# Patient Record
Sex: FEMALE | Employment: OTHER | URBAN - NONMETROPOLITAN AREA
[De-identification: names, ages, dates, MRNs, and addresses within clinical notes are randomized per-mention and may not be internally consistent; named-entity substitution may affect disease eponyms.]

---

## 2020-07-20 ENCOUNTER — TELEPHONE (OUTPATIENT)
Dept: FAMILY MEDICINE CLINIC | Age: 85
End: 2020-07-20

## 2020-07-29 ENCOUNTER — TELEPHONE (OUTPATIENT)
Dept: FAMILY MEDICINE CLINIC | Age: 85
End: 2020-07-29

## 2021-02-24 ENCOUNTER — OUTSIDE SERVICES (OUTPATIENT)
Dept: FAMILY MEDICINE CLINIC | Age: 86
End: 2021-02-24
Payer: COMMERCIAL

## 2021-02-24 DIAGNOSIS — F48.2 PBA (PSEUDOBULBAR AFFECT): ICD-10-CM

## 2021-02-24 DIAGNOSIS — Z79.4 TYPE 2 DIABETES MELLITUS WITH DIABETIC NEPHROPATHY, WITH LONG-TERM CURRENT USE OF INSULIN (HCC): ICD-10-CM

## 2021-02-24 DIAGNOSIS — I10 ESSENTIAL HYPERTENSION: ICD-10-CM

## 2021-02-24 DIAGNOSIS — G30.1 LATE ONSET ALZHEIMER'S DISEASE WITH BEHAVIORAL DISTURBANCE (HCC): ICD-10-CM

## 2021-02-24 DIAGNOSIS — F02.818 LATE ONSET ALZHEIMER'S DISEASE WITH BEHAVIORAL DISTURBANCE (HCC): ICD-10-CM

## 2021-02-24 DIAGNOSIS — E11.21 TYPE 2 DIABETES MELLITUS WITH DIABETIC NEPHROPATHY, WITH LONG-TERM CURRENT USE OF INSULIN (HCC): ICD-10-CM

## 2021-02-24 DIAGNOSIS — D50.9 IRON DEFICIENCY ANEMIA, UNSPECIFIED IRON DEFICIENCY ANEMIA TYPE: Primary | ICD-10-CM

## 2021-02-24 NOTE — PROGRESS NOTES
2/92/1648    Suzanne Chai  [birth year]    This resident is being seen today for a follow-up evaluation visit. 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 She is a 80 y.o. female resident who is being seen today for a follow-up evaluation visit and does reside here on memory care. She has been under assessment for a drop in her H/H and Procrit was recommended but denied given the fact that she has not been placed on iron supplementation. Iron studies were therefore completed and she was appropriate placed on iron daily. She is a resident who indicates that she is doing well and she offers no acute complaints. She did have evidence of a dressing to her right shin area which was removed per her request and brought to the attention of the wound management nurse. This area is kept covered due to irritation from the resident. It was the result of a previous skin tear but has had improvement. Resident currently denies any complaints of pain, headache or dizziness, sore throat, chest pain, coughing or shortness of breath, nausea or vomiting, constipation or diarrhea, dysuria or frequency, fever or chills, recent falls or syncopal events.       Patient's: Nuedexta 20-10 mg 1 capsule twice daily, Remeron 15 mg at bedtime, Tylenol 650 mg every 4 hours as needed, senna tabs every 24 hours as needed, atorvastatin 20 mg at bedtime, aspirin 81 mg daily, Keppra 250 mg twice daily, Westrum 1 packet 2 times a day, Norvasc 5 mg daily, metoprolol tartrate 25 mg twice daily, multivitamin daily, Namenda 28 mg daily, vitamin D3 50,000units weekly, Tradjenta 5 mg daily, Byetta 5 mcg subcutaneously twice daily, Humulin R solution 5 units subcutaneously prior to meals, Celexa 20 mg daily, zinc 50 mg twice daily, vitamin C 500 mg daily, Levemir 10 units subcutaneously in the evening, ferrous sulfate 325 mg daily, Humalog KwikPen sliding scale coverage 4 times a day 201250 =3 units, 251300 =4 units, 301350 =6 units, 351400 =9 units, greater than 400 call MD      Objective     Vital Signs: /62 pulse 73 respiration 16 temperature 98.2 O2 94% weight 165 pounds      Physical examination:Skin is essentially warm and dry. She does have an area to the right lower extremity on the medial aspect with a previous skin tear which is healing. HEENT unremarkable. Neck is supple. Heart regular rate and rhythm with a soft systolic murmur without appreciable change. No rubs or gallops  noted. Lungs are clear to auscultation. No evidence of rhonchi, rales, or wheezing. Abdomen is soft, supple and non-tender. Bowel sounds are noted x4 quadrants. No rigidity, guarding or rebound tenderness. Negative Olmos's, negative McBurney's, negative Hans's. Extremities; she does have evidence of venous insufficiency without any true pitting edema. Pulses are adequate. No clubbing  or no cyanosis noted. She does have chronic arthritic changes with motion restriction to the shoulders and knees. Neurologically she  is alert and oriented x3. No evidence of paralysis or paresthesias noted. Diagnoses and all orders for this visit:    Iron deficiency anemia, unspecified iron deficiency anemia type  Comments:  Ferrous sulfate 325 mg initiated. Will repeat CBC in 1 month. Discontinue Procrit until further notice. Essential hypertension  Comments:  Controlled. Maintain low-dose aspirin with Norvasc and metoprolol.     Type 2 diabetes mellitus with diabetic nephropathy, with long-term current use of insulin (HCC)  Comments:  Controlled with sliding scale coverage, Namenda, Tradjenta, Byetta, and Humulin R as well as Levemir. PBA (pseudobulbar affect)  Comments:  Stable with Nuedexta. Late onset Alzheimer's disease with behavioral disturbance ([address]  Comments:  Maintain low-dose aspirin with Nuedexta. Plan: Plan of care was discussed with the healthcare team with meds and labs reviewed. Labs from January with the inclusion of an H/H of 8.5/8.6 BUN/creatinine 47/2.6 with a GFR of 17. With respect to her hemoglobin, iron studies were obtained on February 19 of 2021 and iron levels were noted to be deficient with an iron of 34 TIBC 225% saturation 15 transferrin 174 and she was thereby placed on ferrous sulfate 325 mg daily. My recommendation at this time will be to discontinue the Procrit and maintain the iron supplementation and reevaluate a CBC in the course of 1 month. It is of note to mention that with respect to her kidney function, resident has adamantly refused any intervention in this regard. I will therefore maintain her current medical regimen as already set forth, monitoring her sugar screens accordingly and I will track her intakes, monitor her weights and behaviors, and see her routinely and as needed with further orders forthcoming. SAMIR ART, APRN - CNP      *Note was creating using voice recognition software.   The document was reviewed however grammatical errors may exist.

## 2021-03-22 ENCOUNTER — OUTSIDE SERVICES (OUTPATIENT)
Dept: FAMILY MEDICINE CLINIC | Age: 86
End: 2021-03-22
Payer: COMMERCIAL

## 2021-03-22 DIAGNOSIS — I10 ESSENTIAL HYPERTENSION: ICD-10-CM

## 2021-03-22 DIAGNOSIS — D50.9 IRON DEFICIENCY ANEMIA, UNSPECIFIED IRON DEFICIENCY ANEMIA TYPE: ICD-10-CM

## 2021-03-22 DIAGNOSIS — E11.21 TYPE 2 DIABETES MELLITUS WITH DIABETIC NEPHROPATHY, WITH LONG-TERM CURRENT USE OF INSULIN (HCC): ICD-10-CM

## 2021-03-22 DIAGNOSIS — Z79.4 TYPE 2 DIABETES MELLITUS WITH DIABETIC NEPHROPATHY, WITH LONG-TERM CURRENT USE OF INSULIN (HCC): ICD-10-CM
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
## 2021-03-22 DIAGNOSIS — F43.21 ADJUSTMENT DISORDER WITH DEPRESSED MOOD: ICD-10-CM

## 2021-03-29 ENCOUNTER — OUTSIDE SERVICES (OUTPATIENT)
Dept: FAMILY MEDICINE CLINIC | Age: 86
End: 2021-03-29
Payer: COMMERCIAL

## 2021-03-29 DIAGNOSIS — F43.21 ADJUSTMENT DISORDER WITH DEPRESSED MOOD: ICD-10-CM

## 2021-03-29 DIAGNOSIS — Z79.4 TYPE 2 DIABETES MELLITUS WITH DIABETIC NEPHROPATHY, WITH LONG-TERM CURRENT USE OF INSULIN (HCC): ICD-10-CM

## 2021-03-29 DIAGNOSIS — R41.82 ALTERED MENTAL STATUS, UNSPECIFIED ALTERED MENTAL STATUS TYPE: Primary | ICD-10-CM

## 2021-03-29 DIAGNOSIS — D50.9 IRON DEFICIENCY ANEMIA, UNSPECIFIED IRON DEFICIENCY ANEMIA TYPE: ICD-10-CM

## 2021-03-29 DIAGNOSIS — R46.89 BEHAVIOR CONCERN: ICD-10-CM

## 2021-03-29 DIAGNOSIS — E11.21 TYPE 2 DIABETES MELLITUS WITH DIABETIC NEPHROPATHY, WITH LONG-TERM CURRENT USE OF INSULIN (HCC): ICD-10-CM

## 2021-03-29 PROCEDURE — 99310 SBSQ NF CARE HIGH MDM 45: CPT | Performed by: NURSE PRACTITIONER

## 2021-03-29 NOTE — PROGRESS NOTES
subcutaneously in the evening  ferrous sulfate 325 mg daily  Humalog KwikPen sliding scale coverage 4 times a day 201250 =3 units, 251300 =4 units, 301350 =6 units, 351400 =9 units, greater than 400 call MD      Objective     Vital Signs: /68 pulse 72 respiration 16 temperature 97.6 O2 97% weight 166 pounds      Physical examination:Skin is essentially warm and dry. She does have an area to the right lower extremity on the medial aspect with a previous skin tear which is healing. HEENT unremarkable. Neck is supple. Heart regular rate and rhythm with a soft systolic murmur without appreciable change. No rubs or gallops  noted. Lungs are clear to auscultation. No evidence of rhonchi, rales, or wheezing. Abdomen is soft, supple and non-tender. Bowel sounds are noted x4 quadrants. No rigidity, guarding or rebound tenderness. Negative Olmos's, negative McBurney's, negative Hans's. Extremities; she does have evidence of venous insufficiency without any true pitting edema. Pulses are adequate. No clubbing  or no cyanosis noted. She does have chronic arthritic changes with motion restriction to the shoulders and knees. Neurologically she  is alert and oriented x3. No evidence of paralysis or paresthesias noted. Diagnoses and all orders for this visit:    Altered mental status, unspecified altered mental status type  Comments: Will obtain UA C&S with ammonia level. Behavior concern  Comments:  Resident more angry with staffing. Will obtain urinalysis and ammonia level. Adjustment disorder with depressed mood  Comments:  Maintain Nuedexta with low-dose Remeron and Namenda. Iron deficiency anemia, unspecified iron deficiency anemia type    Type 2 diabetes mellitus with diabetic nephropathy, with long-term current use of insulin (HCC)  Comments:  Maintain Tradjenta, Byetta, and sliding scale coverage along with Humulin R prior to meals.     Plan: Plan of care was discussed with the healthcare team with meds and labs reviewed. BUN/creatinine 55/2.8 with a GFR of 16 with an H/H of 9.0/28.8. Based on the concern with respect to the resident and her behavior, I am been to recommend a urinalysis with culture and sensitivity. I will furthermore obtain an ammonia level in the morning. I do not feel that there is really any underlying infectious etiology, but just her adjustment disorder. She may benefit from an anxiolytic if we are unable to control her behaviors. At this time I will maintain her current medical regimen and I will track her intakes, monitor her weights and behaviors, and see her routinely and as needed with further orders forthcoming. SAMIR ART, APRN - CNP      *Note was creating using voice recognition software.   The document was reviewed however grammatical errors may exist.

## 2021-04-26 ENCOUNTER — TELEPHONE (OUTPATIENT)
Dept: FAMILY MEDICINE CLINIC | Age: 86
End: 2021-04-26

## 2021-04-26 ENCOUNTER — OUTSIDE SERVICES (OUTPATIENT)
Dept: FAMILY MEDICINE CLINIC | Age: 86
End: 2021-04-26
Payer: COMMERCIAL

## 2021-04-26 DIAGNOSIS — D50.9 IRON DEFICIENCY ANEMIA, UNSPECIFIED IRON DEFICIENCY ANEMIA TYPE: ICD-10-CM

## 2021-04-26 DIAGNOSIS — Z79.4 TYPE 2 DIABETES MELLITUS WITH DIABETIC NEPHROPATHY, WITH LONG-TERM CURRENT USE OF INSULIN (HCC): ICD-10-CM

## 2021-04-26 DIAGNOSIS — I10 ESSENTIAL HYPERTENSION: ICD-10-CM

## 2021-04-26 DIAGNOSIS — E11.21 TYPE 2 DIABETES MELLITUS WITH DIABETIC NEPHROPATHY, WITH LONG-TERM CURRENT USE OF INSULIN (HCC): ICD-10-CM

## 2021-04-26 DIAGNOSIS — F43.21 ADJUSTMENT DISORDER WITH DEPRESSED MOOD: ICD-10-CM
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
## 2021-04-26 NOTE — TELEPHONE ENCOUNTER
Daughter called with concerns on her mother who resides at [place], states mother has been calling her over the last 2 weeks stating that she wants daughter to get large sums of money out of bank, that mother is leaving [place], going someplace else, not making any sense at all, refusing to take any medications except her insulin. Daughter, [facility] has got calls from [place] informing her of medications that ilsa will not take. [facility] is hoping someone can see her mother soon.

## 2021-04-27 NOTE — PROGRESS NOTES
7/50/4880    Jamshid Sierra  [birth year]    This resident is being seen today for a follow-up evaluation visit. 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 She is a 80 y.o. female resident who is being seen today for a follow-up evaluation visit with acute concerns regarding recurrent falls. Resident did have 2 falls on 4/24/2021. She apparently did have a skin tear noted to her left knee. This is a resident to is fairly alert and oriented and is independently ambulatory with the use of a walker. She does remember her falls and indicates that she did hurt her knee and that the dressing was changed today. This is a resident who is still fairly adamant about going back to the home setting, which I did explain to her would be at the discretion of her family. This resident is a known diabetic but apparently was refusing blood sugar Accu-Cheks and they were therefore discontinued due to ongoing refusal.  They are checked on an as-needed basis. This resident denies any other complaints at this time in terms of pain, headaches or dizziness, sore throat, chest pain, coughing or shortness of breath, nausea or vomiting, constipation or diarrhea, fever or chills syncopal events or seizure activity.         Medications:   Nuedexta 20-10 mg 1 capsule twice daily Remeron 15 mg at bedtime  Tylenol 650 mg every 4 hours as needed senna tabs every 24 hours as needed atorvastatin 20 mg at bedtime  aspirin 81 mg daily  Keppra 250 mg twice daily  Questran 1 packet 2 times a day  Norvasc 5 mg daily  metoprolol tartrate 25 mg twice daily multivitamin daily  Namenda 28 mg daily vitamin D3 50,000units weekly  Tradjenta 5 mg daily  Byetta 5 mcg subcutaneously twice daily Humulin R solution 5 units subcutaneously prior to meals  Celexa 20 mg daily  zinc 50 mg twice daily  vitamin C 500 mg daily  Levemir 10 units subcutaneously in the evening  ferrous sulfate 325 mg daily        Objective     Vital Signs: /72 pulse 71 respiration 18 temperature 97.7 O2 97% weight 163.3 pounds      Physical examination:Skin is essentially warm and dry. Resident is being monitored accordingly with respect to a skin tear to the left knee. Resident does have a dry patchy area which is reddened and nonraised to the left jawline. HEENT unremarkable. Neck is supple. Heart regular rate and rhythm with a soft systolic murmur without appreciable change. No rubs or gallops  noted. Lungs are clear to auscultation. No evidence of rhonchi, rales, or wheezing. Abdomen is soft, supple and non-tender. Bowel sounds are noted x4 quadrants. No rigidity, guarding or rebound tenderness. Negative Olmos's, negative McBurney's, negative Hans's. Extremities; she does have evidence of venous insufficiency without any true pitting edema. Pulses are adequate. No clubbing  or no cyanosis noted. She does have chronic arthritic changes with motion restriction to the shoulders and knees. Neurologically she  is alert and oriented x3. No evidence of paralysis or paresthesias noted.     Diagnoses and all orders for this visit:    Fall, sequela  Comments:  Currently stable with a sign in room to ask for help with benefit of gripper socks when not utilizing shoes    Essential hypertension  Comments:  Controlled with low-dose aspirin, Norvasc and close observation of blood pressure    Type 2 diabetes mellitus with diabetic nephropathy, with long-term current use of insulin ([facility])  Comments:  Stable with Byetta, Humulin R and Levemir with blood sugar Accu-Cheks discontinued due to refusal    Iron deficiency anemia, unspecified iron deficiency anemia type  Comments:  Stable with multivitamin and iron supplementation and close observation of CBC    Adjustment disorder with depressed mood  Comments:  Stable with low-dose Remeron, Namenda, and Nuedexta    Plan: Plan of care was discussed with the healthcare team with meds and labs reviewed. H/H of 8.9/28.3 BUN/creatinine 47/2.4 with a GFR of 19. Resident does appear to be relatively stable with respect to her falls and she is being monitored accordingly in this regard. She does have a sign in her room to remind her to ask for assistance benefits from gripper socks when not utilizing shoes. Regarding the area on her face, I am going to recommend the benefit of Kenalog 0.1% to be applied to that left jawline twice a day and then as needed thereafter. I will otherwise maintain her plan of care with the benefit of her Rollator walker when ambulating and her regular diet along with her super cereal and house supplement. We will furthermore maintain her low-dose Remeron. I will track her intakes, monitor her weights and behaviors, and see her routinely and as needed with further orders forthcoming. SAMIR ART, APRN - CNP      *Note was creating using voice recognition software.   The document was reviewed however grammatical errors may exist.

## 2021-04-29 ENCOUNTER — OUTSIDE SERVICES (OUTPATIENT)
Dept: FAMILY MEDICINE CLINIC | Age: 86
End: 2021-04-29
Payer: COMMERCIAL

## 2021-04-29 VITALS
RESPIRATION RATE: 18 BRPM | OXYGEN SATURATION: 97 % | TEMPERATURE: 97.9 F | DIASTOLIC BLOOD PRESSURE: 72 MMHG | HEART RATE: 73 BPM | SYSTOLIC BLOOD PRESSURE: 165 MMHG

## 2021-04-29 DIAGNOSIS — E78.5 HYPERLIPIDEMIA, UNSPECIFIED HYPERLIPIDEMIA TYPE: ICD-10-CM

## 2021-04-29 DIAGNOSIS — N18.4 STAGE 4 CHRONIC KIDNEY DISEASE (HCC): Primary | ICD-10-CM

## 2021-04-29 DIAGNOSIS — F43.21 SITUATIONAL DEPRESSION: ICD-10-CM

## 2021-04-29 DIAGNOSIS — D63.8 ANEMIA OF CHRONIC DISEASE: ICD-10-CM

## 2021-04-29 DIAGNOSIS — Z79.4 TYPE 2 DIABETES MELLITUS WITHOUT COMPLICATION, WITH LONG-TERM CURRENT USE OF INSULIN (HCC): ICD-10-CM

## 2021-04-29 DIAGNOSIS — E55.9 VITAMIN D DEFICIENCY: ICD-10-CM

## 2021-04-29 DIAGNOSIS — Z00.8 ENCOUNTER FOR OTHER GENERAL EXAMINATION: ICD-10-CM

## 2021-04-29 DIAGNOSIS — N32.81 OVERACTIVE BLADDER: ICD-10-CM

## 2021-04-29 DIAGNOSIS — E11.9 TYPE 2 DIABETES MELLITUS WITHOUT COMPLICATION, WITH LONG-TERM CURRENT USE OF INSULIN (HCC): ICD-10-CM

## 2021-04-29 DIAGNOSIS — I10 HYPERTENSION, UNSPECIFIED TYPE: ICD-10-CM

## 2021-04-29 DIAGNOSIS — F01.50 VASCULAR DEMENTIA WITHOUT BEHAVIORAL DISTURBANCE (HCC): ICD-10-CM

## 2021-04-29 DIAGNOSIS — M19.90 OSTEOARTHRITIS, UNSPECIFIED OSTEOARTHRITIS TYPE, UNSPECIFIED SITE: ICD-10-CM

## 2021-04-29 PROCEDURE — G0439 PPPS, SUBSEQ VISIT: HCPCS | Performed by: CLINICAL NURSE SPECIALIST

## 2021-04-30 PROBLEM — F01.50 VASCULAR DEMENTIA WITHOUT BEHAVIORAL DISTURBANCE (HCC): Status: ACTIVE | Noted: 2021-04-30

## 2021-04-30 PROBLEM — E55.9 VITAMIN D DEFICIENCY: Status: ACTIVE | Noted: 2021-04-30

## 2021-04-30 PROBLEM — M19.90 OSTEOARTHRITIS: Status: ACTIVE | Noted: 2021-04-30

## 2021-04-30 PROBLEM — E11.9 TYPE 2 DIABETES MELLITUS WITHOUT COMPLICATION, WITH LONG-TERM CURRENT USE OF INSULIN (HCC): Status: ACTIVE | Noted: 2021-04-30

## 2021-04-30 PROBLEM — N18.4 STAGE 4 CHRONIC KIDNEY DISEASE (HCC): Status: ACTIVE | Noted: 2021-04-30

## 2021-04-30 PROBLEM — Z79.4 TYPE 2 DIABETES MELLITUS WITHOUT COMPLICATION, WITH LONG-TERM CURRENT USE OF INSULIN (HCC): Status: ACTIVE | Noted: 2021-04-30

## 2021-04-30 PROBLEM — E78.5 HYPERLIPIDEMIA: Status: ACTIVE | Noted: 2021-04-30

## 2021-04-30 PROBLEM — I10 HYPERTENSION: Status: ACTIVE | Noted: 2021-04-30

## 2021-04-30 PROBLEM — N32.81 OVERACTIVE BLADDER: Status: ACTIVE | Noted: 2021-04-30

## 2021-04-30 PROBLEM — F43.21 SITUATIONAL DEPRESSION: Status: ACTIVE | Noted: 2021-04-30

## 2021-04-30 PROBLEM — D63.8 ANEMIA OF CHRONIC DISEASE: Status: ACTIVE | Noted: 2021-04-30

## 2021-04-30 ASSESSMENT — PATIENT HEALTH QUESTIONNAIRE - PHQ9
SUM OF ALL RESPONSES TO PHQ9 QUESTIONS 1 & 2: 0
SUM OF ALL RESPONSES TO PHQ QUESTIONS 1-9: 0
SUM OF ALL RESPONSES TO PHQ QUESTIONS 1-9: 0
2. FEELING DOWN, DEPRESSED OR HOPELESS: 0

## 2021-04-30 ASSESSMENT — LIFESTYLE VARIABLES: HOW OFTEN DO YOU HAVE A DRINK CONTAINING ALCOHOL: 0

## 2021-04-30 NOTE — PROGRESS NOTES
Medicare Annual Wellness Visit  Name: Radha Gonzáles Date: 2021   MRN: <[id number]> Sex: Female   Age: 80 y.o. Ethnicity: Unavailable/Unknown   : 1926 Race: Candy Hickman is here for Medicare AWV    Screenings for behavioral, psychosocial and functional/safety risks, and cognitive dysfunction are all negative except as indicated below. These results, as well as other patient data from the [address] form, are documented in Flowsheets linked to this Encounter. Allergies   Allergen Reactions    Penicillins        Prior to Visit Medications    Not on File       No past medical history on file. No past surgical history on file. No family history on file. CareTeam (Including outside providers/suppliers regularly involved in providing care):   Patient Care Team:  Kirsten Colvin DO as PCP - General (Family Medicine)  Kirsten Colvin DO as PCP - [facility] Provider    Wt Readings from Last 3 Encounters:   No data found for Wt     Vitals:    21 1300   BP: (!) 165/72   Pulse: 73   Resp: 18   Temp: 97.9 °F (36.6 °C)   SpO2: 97%     There is no height or weight on file to calculate BMI. Based upon direct observation of the patient, evaluation of cognition reveals recent and remote memory intact.     General Appearance: alert and oriented to person, place and time, well developed and well- nourished, in no acute distress  Skin: warm and dry, no rash or erythema  Head: normocephalic and atraumatic  Eyes: pupils equal, round, and reactive to light, extraocular eye movements intact, conjunctivae normal  ENT: tympanic membrane, external ear and ear canal normal bilaterally, nose without deformity, nasal mucosa and turbinates normal without polyps  Neck: supple and non-tender without mass, no thyromegaly or thyroid nodules, no cervical lymphadenopathy  Pulmonary/Chest: clear to auscultation bilaterally- no wheezes, rales or rhonchi, normal air movement, (!) Yes  Do you eat only one meal per day?: No  Have you seen the dentist within the past year?: N/A - wear dentures     Health Habits/Nutrition Interventions:  · Nutritional issues:  encourage patient to eat at least 2 meals daily    Hearing/Vision:  No exam data present  Hearing/Vision  Do you or your family notice any trouble with your hearing that hasn't been managed with hearing aids?: No  Do you have difficulty driving, watching TV, or doing any of your daily activities because of your eyesight?: No  Have you had an eye exam within the past year?: (!) No  Hearing/Vision Interventions:  · Vision concerns:  patient encouraged to make appointment with his/her eye specialist     ADL:  ADLs  In the past 7 days, did you need help from others to perform any of the following everyday activities? Eating, dressing, grooming, bathing, toileting, or walking/balance?: None  In the past 7 days, did you need help from others to take care of any of the following? Laundry, housekeeping, banking/finances, shopping, telephone use, food preparation, transportation, or taking medications?: [employer], Housekeeping, Food Preparation, Transportation, Taking Medications  ADL Interventions:  · Patient resides in long term care with all services provided    Personalized Preventive Plan   Current Health Maintenance Status    There is no immunization history on file for this patient. Health Maintenance   Topic Date Due    COVID-19 Vaccine (1) Never done    DTaP/Tdap/Td vaccine (1 - Tdap) Never done    Shingles Vaccine (1 of 2) Never done    Pneumococcal 65+ years Vaccine (1 of 1 - PPSV23) Never done   [employer] Visit (AWV)  Never done    Flu vaccine (Season Ended) 09/01/2021    Hepatitis A vaccine  Aged Out    Hib vaccine  Aged Out    Meningococcal (ACWY) vaccine  Aged Out     Recommendations for [employer] Due: see orders and patient instructions/AVS.  .   Recommended screening schedule for the next 5-10 years is provided to the patient in written form: see Patient Siena Maria was seen today for medicare aw.     Diagnoses and all orders for this visit:    Stage 4 chronic kidney disease ([facility]    Anemia of chronic disease    Type 2 diabetes mellitus without complication, with long-term current use of insulin ([facility]    Hypertension, unspecified type    Hyperlipidemia, unspecified hyperlipidemia type    Vascular dementia without behavioral disturbance ([facility]    Situational depression    Vitamin D deficiency    Osteoarthritis, unspecified osteoarthritis type, unspecified site    Overactive bladder           Reviewed all current medications

## 2021-04-30 NOTE — PATIENT INSTRUCTIONS
Personalized Preventive Plan for Alisha Zafar - 0/22/1902  Medicare offers a range of preventive health benefits. Some of the tests and screenings are paid in full while other may be subject to a deductible, co-insurance, and/or copay. Some of these benefits include a comprehensive review of your medical history including lifestyle, illnesses that may run in your family, and various assessments and screenings as appropriate. After reviewing your medical record and screening and assessments performed today your provider may have ordered immunizations, labs, imaging, and/or referrals for you. A list of these orders (if applicable) as well as your Preventive Care list are included within your After Visit Summary for your review. Other Preventive Recommendations:    · A preventive eye exam performed by an eye specialist is recommended every 1-2 years to screen for glaucoma; cataracts, macular degeneration, and other eye disorders. · A preventive dental visit is recommended every 6 months. · Try to get at least 150 minutes of exercise per week or 10,000 steps per day on a pedometer . · Order or download the FREE \"Exercise & Physical Activity: Your Everyday Guide\" from [employer]. Call 8-671.777.9425 or search [employer] online. · You need 9635-8827 mg of calcium and 6261-8796 IU of vitamin D per day. It is possible to meet your calcium requirement with diet alone, but a vitamin D supplement is usually necessary to meet this goal.  · When exposed to the sun, use a sunscreen that protects against both UVA and UVB radiation with an SPF of 30 or greater. Reapply every 2 to 3 hours or after sweating, drying off with a towel, or swimming. · Always wear a seat belt when traveling in a car. Always wear a helmet when riding a bicycle or motorcycle.

## 2021-05-25 ENCOUNTER — OUTSIDE SERVICES (OUTPATIENT)
Dept: FAMILY MEDICINE CLINIC | Age: 86
End: 2021-05-25
Payer: COMMERCIAL

## 2021-05-25 DIAGNOSIS — R60.9 PERIPHERAL EDEMA: Primary | ICD-10-CM

## 2021-05-25 DIAGNOSIS — R63.5 WEIGHT GAIN: ICD-10-CM

## 2021-05-25 DIAGNOSIS — N18.4 STAGE 4 CHRONIC KIDNEY DISEASE (HCC): ICD-10-CM

## 2021-05-25 DIAGNOSIS — E55.9 VITAMIN D DEFICIENCY: ICD-10-CM

## 2021-05-25 DIAGNOSIS — F01.50 VASCULAR DEMENTIA WITHOUT BEHAVIORAL DISTURBANCE (HCC): ICD-10-CM

## 2021-05-25 NOTE — PROGRESS NOTES
daily  Levemir 10 units subcutaneously in the evening  ferrous sulfate 325 mg daily        Objective     Vital Signs: /72 pulse 54 respiration 20 temperature 98.1 O2 97% weight 174 pounds      Physical examination:Skin is essentially warm and dry. HEENT unremarkable. Neck is supple. Heart regular rate and rhythm with a soft systolic murmur without appreciable change. No rubs or gallops  noted. Lungs are clear to auscultation. No evidence of rhonchi, rales, or wheezing. Abdomen is soft, supple and non-tender. Bowel sounds are noted x4 quadrants. No rigidity, guarding or rebound tenderness. Negative Olmos's, negative McBurney's, negative Hans's. Extremities; she does have evidence of venous insufficiency with residual pitting edema to the bilateral lower extremities from below the knees and noted distally. Pulses are adequate. No clubbing  or no cyanosis noted. She does have chronic arthritic changes with motion restriction to the shoulders and knees. Neurologically she  is alert and oriented x3. No evidence of paralysis or paresthesias noted. Diagnoses and all orders for this visit:    Peripheral edema  Comments:  Initiate Bumex 0.5 mg daily x7 days    Weight gain  Comments:  Most likely secondary to peripheral edema and will initiate cautious diuretic management with respect to her underlying kidney disease    Stage 4 chronic kidney disease (HCC)  Comments:  Initiate cautious diuretic management and repeat blood work in the course of 1 week    Vascular dementia without behavioral disturbance ([facility] [address]  Comments:  Maintain low-dose aspirin with Namenda, Nuedexta and low-dose Remeron    Vitamin D deficiency  Comments:  Maintain vitamin D supplementation and monitor vitamin D level accordingly         Plan: Plan of care was discussed with the healthcare team with meds and labs reviewed.   Based on my concern regarding this resident edema with notable weight gain, I am been to recommend Bumex 0.5 mg daily for the course of 7 days. I will then reevaluate her BMP as well as a BNP. Furthermore, I will obtain a chest x-ray on this resident as well. I will furthermore maintain her plan of care as otherwise indicated and I will track her intakes, monitor her weights and behaviors, and see her routinely and as needed with further orders forthcoming. SAMIR ART, APRN - CNP      *Note was creating using voice recognition software.   The document was reviewed however grammatical errors may exist.

## 2021-06-21 ENCOUNTER — OUTSIDE SERVICES (OUTPATIENT)
Dept: FAMILY MEDICINE CLINIC | Age: 86
End: 2021-06-21

## 2021-06-21 DIAGNOSIS — R60.9 PERIPHERAL EDEMA: ICD-10-CM

## 2021-06-21 DIAGNOSIS — I50.9 CONGESTIVE HEART FAILURE, UNSPECIFIED HF CHRONICITY, UNSPECIFIED HEART FAILURE TYPE (HCC): ICD-10-CM

## 2021-06-21 DIAGNOSIS — R45.4 BEHAVIOR-IRRITABILITY: Primary | ICD-10-CM

## 2021-06-21 DIAGNOSIS — N18.4 STAGE 4 CHRONIC KIDNEY DISEASE (HCC): ICD-10-CM

## 2021-06-21 DIAGNOSIS — R29.6 RECURRENT FALLS: ICD-10-CM

## 2021-06-21 NOTE — PROGRESS NOTES
5/86/7447    Christiano Godfrey  0/6/9263    This resident is being seen today for a skilled evaluation visit. 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 She is a 80 y.o. female resident who is being seen today for a skilled evaluation visit with acute concerns regarding behaviors. Staff indicates the resident does become very upset and angry with staff from time to time and are requesting a as needed anxiolytic. Furthermore, staff did bring to my attention that she has had some recurrent falls occurring on the 18th and again this morning. Her one fall did result in a rather large skin tear to her right upper extremity. To mention, this resident was a relatively recent readmission to the facility after being sent out from assisted living due to acute decompensating congestive heart failure. Resident was treated accordingly in the hospital setting and did review turn here for further convalescent care. She does have recommendations for therapy services, when she is compliant. She became relatively upset today and was very adamant that she wanted to go back to her assisted living room setting and refused to utilize her oxygen or take medications or be compliant with staff in any way shape or form. Staffing therefore had to take her back to the assisted living and let her stay in her room for her to participate. Resident does understand that she will not be sleeping in the assisted living room.   She indicates that she has not had any headaches or dizziness, sore throat, chest pain, coughing or shortness of breath, nausea or vomiting, constipation or alert and oriented x3. No evidence of paralysis or paresthesias noted. Diagnoses and all orders for this visit:    Behavior-irritability  Comments:  Initiate Xanax 0.25 mg 3 times a day as needed    Recurrent falls  Comments:  Seen in conjunction with therapy. Recommendations given for assist x2 with resident noncompliant this regard. Stage 4 chronic kidney disease (HCC)  Comments:  Recent recommendations for downward titration of Bumex with Zaroxolyn    Congestive heart failure, unspecified HF chronicity, unspecified heart failure type ([address]  Comments:  Maintain cautious diuretic management    Peripheral edema  Comments:  Continue with cautious diuretic management and monitor weights accordingly         Plan: Plan of care was discussed with the healthcare team with meds and labs reviewed. BUN/creatinine 71/3.5 H/H 9.8/31. 3. Due to concern with regards to this resident's behaviors, I am going to recommend Xanax 0.25 mg 3 times a day as needed. I will maintain the previous recommendations for downward titration of the Bumex with Zaroxolyn due to kidney function. Resident will be maintained on her oxygen supplementation as tolerated. Wound care will continue to follow her with respect to the area on her right upper extremity, which occurred secondary to a fall. She will continue with her therapy services with the benefit of regular diet with mechanical soft texture and I will track her intakes, monitor her weights and behaviors, and see her routinely with coping. SAMIR ART, APRN - CNP      *Note was creating using voice recognition software.   The document was reviewed however grammatical errors may exist.

## 2021-06-28 ENCOUNTER — OUTSIDE SERVICES (OUTPATIENT)
Dept: FAMILY MEDICINE CLINIC | Age: 86
End: 2021-06-28

## 2021-06-28 DIAGNOSIS — I50.9 CONGESTIVE HEART FAILURE, UNSPECIFIED HF CHRONICITY, UNSPECIFIED HEART FAILURE TYPE (HCC): ICD-10-CM

## 2021-06-28 DIAGNOSIS — R63.4 WEIGHT LOSS: ICD-10-CM

## 2021-06-28 DIAGNOSIS — R45.4 BEHAVIOR-IRRITABILITY: ICD-10-CM

## 2021-06-28 DIAGNOSIS — I10 HYPERTENSION, UNSPECIFIED TYPE: ICD-10-CM

## 2021-06-28 DIAGNOSIS — R29.6 RECURRENT FALLS: Primary | ICD-10-CM

## 2021-06-28 NOTE — PROGRESS NOTES
7/27/7864    Antonio List  [birth year]    This resident is being seen today for a skilled evaluation visit. 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 She is a 80 y.o. female resident who is being seen today for a skilled services with which this resident has had the benefit of physical therapy and occupational therapy services. She has been under assessment for recurrent falls most recently as of 6/24/2021, with no apparent injury. This is a resident who is seen in conjunction with psychiatric services with which the nurse practitioner and I did discuss the plan of care. Resident does remain alert responsive to verbal and tactile stimuli and does become agitated from time to time. She currently states that she has no pain, headaches or dizziness, sore throat, chest pain, coughing or shortness of breath, nausea or vomiting, constipation or diarrhea, dysuria or frequency, fever or chills or syncopal events. She has been under assessment with respect to weight loss and does follow accordingly with the dietitian and maintains the benefit of low-dose Remeron.              Medications:   Aspirin 81 mg daily  Sennosides capsule every 24 hours as needed  Linagliptin 5 mg daily  Tylenol 650 mg every 4 hours as needed  Keppra 250 mg  daily x1 week and DC  Multivitamin daily  Norvasc 5 mg daily  Celexa 20 mg daily  Levemir 36 units subcutaneously in the morning  Mirtazapine 15 mg at bedtime  Ferrous sulfate 325 mg daily  Metoprolol tartrate 50 mg twice daily  Byetta 5 mcg subcutaneously twice daily  Humalog sliding scale 201250 =3 units; 200300 =4 units; 585759 =6 units; 351400 =9 units; subcutaneously twice daily  Humalog KwikPen 5 units subcutaneously before meals  Nuedexta 2010 1 capsule twice daily  Duo nebs via inhalation every 2 hours as needed  Lipitor 20 mg daily  Metolazone 1.25 mg every M- W- F  Bumex 0.5 mg daily  Xanax 0.25 mg twice daily         Objective     Vital Signs: /56 pulse 57 respiration 18 temperature 98.2 O2 93% weight 161 pounds      Physical examination:Skin is essentially warm and dry. HEENT unremarkable. Neck is supple. Heart regular rate and rhythm with a soft systolic murmur without appreciable change. No rubs or gallops  noted. Lungs are consistent with some degree of crackles to the left lower lobe. No evidence of rhonchi, rales, or wheezing. Abdomen is soft, supple and non-tender. Bowel sounds are noted x4 quadrants. No rigidity, guarding or rebound tenderness. Negative Olmos's, negative McBurney's, negative Hans's. Extremities; she does have evidence of venous insufficiency with residual pitting edema to the bilateral lower extremities from below the knees and noted distally, with improvement in this regard. Pulses are adequate. No clubbing  or no cyanosis noted. She does have chronic arthritic changes with motion restriction to the shoulders and knees. Neurologically she  is alert and oriented x3. No evidence of paralysis or paresthesias noted. Diagnoses and all orders for this visit:    Recurrent falls  Comments:  Most recent fall dated 6/24/2021 with no apparent injuries noted. Medications furthermore noted reviewed and recommendations given    Behavior-irritability  Comments:  Initiate Xanax twice a day routinely.   Discontinue Namenda and downward titrate and discontinue Keppra    Weight loss  Comments:  Maintain low-dose Remeron with diabetic house supplement and regular diet    Hypertension, unspecified type  Comments:  Controlled with low-dose aspirin Norvasc, metoprolol and diuretic management    Congestive

## 2021-07-23 ENCOUNTER — APPOINTMENT (OUTPATIENT)
Dept: CT IMAGING | Age: 86
DRG: 291 | End: 2021-07-23
Payer: COMMERCIAL

## 2021-07-23 ENCOUNTER — HOSPITAL ENCOUNTER (INPATIENT)
Age: 86
LOS: 12 days | Discharge: NURSING, REHAB OR LONG-TERM CARE | DRG: 291 | End: 2021-08-04
Attending: EMERGENCY MEDICINE | Admitting: INTERNAL MEDICINE
Payer: COMMERCIAL

## 2021-07-23 ENCOUNTER — APPOINTMENT (OUTPATIENT)
Dept: GENERAL RADIOLOGY | Age: 86
DRG: 291 | End: 2021-07-23
Payer: COMMERCIAL

## 2021-07-23 DIAGNOSIS — N17.9 ACUTE RENAL FAILURE SUPERIMPOSED ON CHRONIC KIDNEY DISEASE, UNSPECIFIED CKD STAGE, UNSPECIFIED ACUTE RENAL FAILURE TYPE (HCC): ICD-10-CM

## 2021-07-23 DIAGNOSIS — J96.01 ACUTE RESPIRATORY FAILURE WITH HYPOXIA (HCC): Primary | ICD-10-CM

## 2021-07-23 DIAGNOSIS — I50.9 ACUTE ON CHRONIC CONGESTIVE HEART FAILURE, UNSPECIFIED HEART FAILURE TYPE (HCC): ICD-10-CM

## 2021-07-23 DIAGNOSIS — E87.5 HYPERKALEMIA: ICD-10-CM

## 2021-07-23 DIAGNOSIS — N18.9 ACUTE RENAL FAILURE SUPERIMPOSED ON CHRONIC KIDNEY DISEASE, UNSPECIFIED CKD STAGE, UNSPECIFIED ACUTE RENAL FAILURE TYPE (HCC): ICD-10-CM

## 2021-07-23 PROBLEM — R94.31 PROLONGED Q-T INTERVAL ON ECG: Status: ACTIVE | Noted: 2021-07-23

## 2021-07-23 LAB
ALBUMIN SERPL-MCNC: 3.5 G/DL (ref 3.5–5.2)
ALBUMIN SERPL-MCNC: ABNORMAL G/DL (ref 3.5–5.2)
ALP BLD-CCNC: 248 U/L (ref 35–104)
ALP BLD-CCNC: ABNORMAL U/L (ref 35–104)
ALT SERPL-CCNC: 36 U/L (ref 0–32)
ALT SERPL-CCNC: ABNORMAL U/L (ref 0–32)
ANION GAP SERPL CALCULATED.3IONS-SCNC: 15 MMOL/L (ref 7–16)
ANION GAP SERPL CALCULATED.3IONS-SCNC: 18 MMOL/L (ref 7–16)
ANION GAP SERPL CALCULATED.3IONS-SCNC: ABNORMAL MMOL/L (ref 7–16)
AST SERPL-CCNC: 36 U/L (ref 0–31)
AST SERPL-CCNC: ABNORMAL U/L (ref 0–31)
B.E.: -2.7 MMOL/L (ref -3–3)
BASOPHILS ABSOLUTE: 0.05 E9/L (ref 0–0.2)
BASOPHILS RELATIVE PERCENT: 0.5 % (ref 0–2)
BILIRUB SERPL-MCNC: 0.3 MG/DL (ref 0–1.2)
BILIRUB SERPL-MCNC: ABNORMAL MG/DL (ref 0–1.2)
BUN BLDV-MCNC: 81 MG/DL (ref 6–23)
BUN BLDV-MCNC: 84 MG/DL (ref 6–23)
BUN BLDV-MCNC: ABNORMAL MG/DL (ref 6–23)
CALCIUM SERPL-MCNC: 8.5 MG/DL (ref 8.6–10.2)
CALCIUM SERPL-MCNC: 8.6 MG/DL (ref 8.6–10.2)
CALCIUM SERPL-MCNC: ABNORMAL MG/DL (ref 8.6–10.2)
CHLORIDE BLD-SCNC: 96 MMOL/L (ref 98–107)
CHLORIDE BLD-SCNC: 97 MMOL/L (ref 98–107)
CHLORIDE BLD-SCNC: ABNORMAL MMOL/L (ref 98–107)
CO2: 21 MMOL/L (ref 22–29)
CO2: 24 MMOL/L (ref 22–29)
CO2: ABNORMAL MMOL/L (ref 22–29)
COHB: 0.9 % (ref 0–1.5)
CREAT SERPL-MCNC: 3.1 MG/DL (ref 0.5–1)
CREAT SERPL-MCNC: 3.4 MG/DL (ref 0.5–1)
CREAT SERPL-MCNC: ABNORMAL MG/DL (ref 0.5–1)
CRITICAL: ABNORMAL
DATE ANALYZED: ABNORMAL
DATE OF COLLECTION: ABNORMAL
EOSINOPHILS ABSOLUTE: 0.08 E9/L (ref 0.05–0.5)
EOSINOPHILS RELATIVE PERCENT: 0.8 % (ref 0–6)
GFR AFRICAN AMERICAN: 13
GFR AFRICAN AMERICAN: 15
GFR AFRICAN AMERICAN: 17
GFR AFRICAN AMERICAN: ABNORMAL
GFR NON-AFRICAN AMERICAN: 11 ML/MIN/1.73
GFR NON-AFRICAN AMERICAN: 13 ML/MIN/1.73
GFR NON-AFRICAN AMERICAN: 14 ML/MIN/1.73
GFR NON-AFRICAN AMERICAN: ABNORMAL ML/MIN/1.73
GLUCOSE BLD-MCNC: 132 MG/DL (ref 74–99)
GLUCOSE BLD-MCNC: 154 MG/DL (ref 74–99)
GLUCOSE BLD-MCNC: 258 MG/DL (ref 74–99)
GLUCOSE BLD-MCNC: ABNORMAL MG/DL (ref 74–99)
HCO3: 22.2 MMOL/L (ref 22–26)
HCT VFR BLD CALC: 33.6 % (ref 34–48)
HEMOGLOBIN: 10.2 G/DL (ref 11.5–15.5)
HHB: 14.3 % (ref 0–5)
IMMATURE GRANULOCYTES #: 0.11 E9/L
IMMATURE GRANULOCYTES %: 1.2 % (ref 0–5)
LAB: ABNORMAL
LACTIC ACID, SEPSIS: 1.3 MMOL/L (ref 0.5–1.9)
LACTIC ACID, SEPSIS: 1.8 MMOL/L (ref 0.5–1.9)
LYMPHOCYTES ABSOLUTE: 1.22 E9/L (ref 1.5–4)
LYMPHOCYTES RELATIVE PERCENT: 12.9 % (ref 20–42)
Lab: ABNORMAL
MCH RBC QN AUTO: 28.4 PG (ref 26–35)
MCHC RBC AUTO-ENTMCNC: 30.4 % (ref 32–34.5)
MCV RBC AUTO: 93.6 FL (ref 80–99.9)
METHB: 0.3 % (ref 0–1.5)
MODE: ABNORMAL
MONOCYTES ABSOLUTE: 0.67 E9/L (ref 0.1–0.95)
MONOCYTES RELATIVE PERCENT: 7.1 % (ref 2–12)
NEUTROPHILS ABSOLUTE: 7.33 E9/L (ref 1.8–7.3)
NEUTROPHILS RELATIVE PERCENT: 77.5 % (ref 43–80)
O2 CONTENT: 13 ML/DL
O2 SATURATION: 85.5 % (ref 92–98.5)
O2HB: 84.5 % (ref 94–97)
OPERATOR ID: 1394
PATIENT TEMP: 37 C
PCO2: 38.6 MMHG (ref 35–45)
PDW BLD-RTO: 17.9 FL (ref 11.5–15)
PERFORMED ON: ABNORMAL
PH BLOOD GAS: 7.38 (ref 7.35–7.45)
PLATELET # BLD: 312 E9/L (ref 130–450)
PMV BLD AUTO: 10.3 FL (ref 7–12)
PO2: 53.1 MMHG (ref 75–100)
POC CHLORIDE: 101 MMOL/L (ref 100–108)
POC CREATININE: 3.9 MG/DL (ref 0.5–1)
POC POTASSIUM: 5.6 MMOL/L (ref 3.5–5)
POC SODIUM: 135 MMOL/L (ref 132–146)
POTASSIUM REFLEX MAGNESIUM: 5.9 MMOL/L (ref 3.5–5)
POTASSIUM REFLEX MAGNESIUM: ABNORMAL MMOL/L (ref 3.5–5)
POTASSIUM SERPL-SCNC: 5.6 MMOL/L (ref 3.5–5)
PRO-BNP: 7692 PG/ML (ref 0–450)
PRO-BNP: ABNORMAL PG/ML (ref 0–450)
RBC # BLD: 3.59 E12/L (ref 3.5–5.5)
SARS-COV-2, NAAT: NOT DETECTED
SODIUM BLD-SCNC: 135 MMOL/L (ref 132–146)
SODIUM BLD-SCNC: 136 MMOL/L (ref 132–146)
SODIUM BLD-SCNC: ABNORMAL MMOL/L (ref 132–146)
SOURCE, BLOOD GAS: ABNORMAL
THB: 10.9 G/DL (ref 11.5–16.5)
TIME ANALYZED: 1211
TOTAL PROTEIN: 7.1 G/DL (ref 6.4–8.3)
TOTAL PROTEIN: ABNORMAL G/DL (ref 6.4–8.3)
TROPONIN, HIGH SENSITIVITY: 51 NG/L (ref 0–9)
TROPONIN, HIGH SENSITIVITY: 58 NG/L (ref 0–9)
TROPONIN, HIGH SENSITIVITY: ABNORMAL NG/L (ref 0–9)
WBC # BLD: 9.5 E9/L (ref 4.5–11.5)

## 2021-07-23 PROCEDURE — 6370000000 HC RX 637 (ALT 250 FOR IP): Performed by: INTERNAL MEDICINE

## 2021-07-23 PROCEDURE — 82435 ASSAY OF BLOOD CHLORIDE: CPT

## 2021-07-23 PROCEDURE — 99284 EMERGENCY DEPT VISIT MOD MDM: CPT

## 2021-07-23 PROCEDURE — 6360000002 HC RX W HCPCS: Performed by: STUDENT IN AN ORGANIZED HEALTH CARE EDUCATION/TRAINING PROGRAM

## 2021-07-23 PROCEDURE — 2140000000 HC CCU INTERMEDIATE R&B

## 2021-07-23 PROCEDURE — 84484 ASSAY OF TROPONIN QUANT: CPT

## 2021-07-23 PROCEDURE — 2500000003 HC RX 250 WO HCPCS: Performed by: EMERGENCY MEDICINE

## 2021-07-23 PROCEDURE — 6370000000 HC RX 637 (ALT 250 FOR IP): Performed by: STUDENT IN AN ORGANIZED HEALTH CARE EDUCATION/TRAINING PROGRAM

## 2021-07-23 PROCEDURE — 84132 ASSAY OF SERUM POTASSIUM: CPT

## 2021-07-23 PROCEDURE — 87040 BLOOD CULTURE FOR BACTERIA: CPT

## 2021-07-23 PROCEDURE — 87635 SARS-COV-2 COVID-19 AMP PRB: CPT

## 2021-07-23 PROCEDURE — 6370000000 HC RX 637 (ALT 250 FOR IP): Performed by: EMERGENCY MEDICINE

## 2021-07-23 PROCEDURE — 70450 CT HEAD/BRAIN W/O DYE: CPT

## 2021-07-23 PROCEDURE — 2580000003 HC RX 258: Performed by: EMERGENCY MEDICINE

## 2021-07-23 PROCEDURE — 71045 X-RAY EXAM CHEST 1 VIEW: CPT

## 2021-07-23 PROCEDURE — 96375 TX/PRO/DX INJ NEW DRUG ADDON: CPT

## 2021-07-23 PROCEDURE — 2580000003 HC RX 258: Performed by: STUDENT IN AN ORGANIZED HEALTH CARE EDUCATION/TRAINING PROGRAM

## 2021-07-23 PROCEDURE — 80048 BASIC METABOLIC PNL TOTAL CA: CPT

## 2021-07-23 PROCEDURE — 83880 ASSAY OF NATRIURETIC PEPTIDE: CPT

## 2021-07-23 PROCEDURE — 6360000002 HC RX W HCPCS: Performed by: EMERGENCY MEDICINE

## 2021-07-23 PROCEDURE — 85025 COMPLETE CBC W/AUTO DIFF WBC: CPT

## 2021-07-23 PROCEDURE — 2580000003 HC RX 258: Performed by: INTERNAL MEDICINE

## 2021-07-23 PROCEDURE — 96365 THER/PROPH/DIAG IV INF INIT: CPT

## 2021-07-23 PROCEDURE — 82947 ASSAY GLUCOSE BLOOD QUANT: CPT

## 2021-07-23 PROCEDURE — 82565 ASSAY OF CREATININE: CPT

## 2021-07-23 PROCEDURE — 82805 BLOOD GASES W/O2 SATURATION: CPT

## 2021-07-23 PROCEDURE — 94664 DEMO&/EVAL PT USE INHALER: CPT

## 2021-07-23 PROCEDURE — 6360000002 HC RX W HCPCS: Performed by: INTERNAL MEDICINE

## 2021-07-23 PROCEDURE — 84295 ASSAY OF SERUM SODIUM: CPT

## 2021-07-23 PROCEDURE — 71250 CT THORAX DX C-: CPT

## 2021-07-23 PROCEDURE — 87088 URINE BACTERIA CULTURE: CPT

## 2021-07-23 PROCEDURE — 83605 ASSAY OF LACTIC ACID: CPT

## 2021-07-23 PROCEDURE — 94640 AIRWAY INHALATION TREATMENT: CPT

## 2021-07-23 PROCEDURE — 93005 ELECTROCARDIOGRAM TRACING: CPT | Performed by: STUDENT IN AN ORGANIZED HEALTH CARE EDUCATION/TRAINING PROGRAM

## 2021-07-23 PROCEDURE — 2700000000 HC OXYGEN THERAPY PER DAY

## 2021-07-23 PROCEDURE — 36415 COLL VENOUS BLD VENIPUNCTURE: CPT

## 2021-07-23 PROCEDURE — 80053 COMPREHEN METABOLIC PANEL: CPT

## 2021-07-23 RX ORDER — DEXTROSE MONOHYDRATE 25 G/50ML
12.5 INJECTION, SOLUTION INTRAVENOUS PRN
Status: DISCONTINUED | OUTPATIENT
Start: 2021-07-23 | End: 2021-08-04 | Stop reason: HOSPADM

## 2021-07-23 RX ORDER — SODIUM CHLORIDE 9 MG/ML
25 INJECTION, SOLUTION INTRAVENOUS PRN
Status: DISCONTINUED | OUTPATIENT
Start: 2021-07-23 | End: 2021-08-04 | Stop reason: HOSPADM

## 2021-07-23 RX ORDER — BUMETANIDE 0.5 MG/1
0.5 TABLET ORAL DAILY
Status: ON HOLD | COMMUNITY
End: 2021-07-30 | Stop reason: HOSPADM

## 2021-07-23 RX ORDER — ACETAMINOPHEN 325 MG/1
650 TABLET ORAL EVERY 6 HOURS PRN
COMMUNITY

## 2021-07-23 RX ORDER — ACETAMINOPHEN 650 MG/1
650 SUPPOSITORY RECTAL EVERY 6 HOURS PRN
Status: DISCONTINUED | OUTPATIENT
Start: 2021-07-23 | End: 2021-08-04 | Stop reason: HOSPADM

## 2021-07-23 RX ORDER — EXENATIDE 250 UG/ML
5 INJECTION SUBCUTANEOUS
COMMUNITY

## 2021-07-23 RX ORDER — ACETAMINOPHEN 325 MG/1
650 TABLET ORAL EVERY 6 HOURS PRN
Status: DISCONTINUED | OUTPATIENT
Start: 2021-07-23 | End: 2021-08-04 | Stop reason: HOSPADM

## 2021-07-23 RX ORDER — SODIUM CHLORIDE 0.9 % (FLUSH) 0.9 %
5-40 SYRINGE (ML) INJECTION PRN
Status: DISCONTINUED | OUTPATIENT
Start: 2021-07-23 | End: 2021-08-04 | Stop reason: HOSPADM

## 2021-07-23 RX ORDER — DEXTROSE MONOHYDRATE 25 G/50ML
25 INJECTION, SOLUTION INTRAVENOUS ONCE
Status: COMPLETED | OUTPATIENT
Start: 2021-07-23 | End: 2021-07-23

## 2021-07-23 RX ORDER — SENNA PLUS 8.6 MG/1
1 TABLET ORAL DAILY PRN
COMMUNITY

## 2021-07-23 RX ORDER — LEVETIRACETAM 500 MG/1
250 TABLET ORAL
Status: DISCONTINUED | OUTPATIENT
Start: 2021-07-24 | End: 2021-08-04 | Stop reason: HOSPADM

## 2021-07-23 RX ORDER — FERROUS SULFATE 325(65) MG
325 TABLET ORAL
COMMUNITY

## 2021-07-23 RX ORDER — METOLAZONE 2.5 MG/1
2.5 TABLET ORAL DAILY
Status: ON HOLD | COMMUNITY
End: 2021-07-30 | Stop reason: HOSPADM

## 2021-07-23 RX ORDER — NICOTINE POLACRILEX 4 MG
15 LOZENGE BUCCAL PRN
Status: DISCONTINUED | OUTPATIENT
Start: 2021-07-23 | End: 2021-08-04 | Stop reason: HOSPADM

## 2021-07-23 RX ORDER — M-VIT,TX,IRON,MINS/CALC/FOLIC 27MG-0.4MG
1 TABLET ORAL DAILY
COMMUNITY

## 2021-07-23 RX ORDER — DEXTROMETHORPHAN HYDROBROMIDE AND QUINIDINE SULFATE 20; 10 MG/1; MG/1
1 CAPSULE, GELATIN COATED ORAL 2 TIMES DAILY
COMMUNITY

## 2021-07-23 RX ORDER — DOXYCYCLINE HYCLATE 100 MG/1
100 CAPSULE ORAL 2 TIMES DAILY
COMMUNITY

## 2021-07-23 RX ORDER — INSULIN DETEMIR 100 [IU]/ML
10 INJECTION, SOLUTION SUBCUTANEOUS NIGHTLY
COMMUNITY

## 2021-07-23 RX ORDER — FUROSEMIDE 10 MG/ML
40 INJECTION INTRAMUSCULAR; INTRAVENOUS ONCE
Status: COMPLETED | OUTPATIENT
Start: 2021-07-23 | End: 2021-07-23

## 2021-07-23 RX ORDER — LORAZEPAM 2 MG/ML
1 INJECTION INTRAMUSCULAR ONCE
Status: COMPLETED | OUTPATIENT
Start: 2021-07-23 | End: 2021-07-23

## 2021-07-23 RX ORDER — FUROSEMIDE 10 MG/ML
40 INJECTION INTRAMUSCULAR; INTRAVENOUS
Status: DISCONTINUED | OUTPATIENT
Start: 2021-07-24 | End: 2021-07-25

## 2021-07-23 RX ORDER — ATORVASTATIN CALCIUM 20 MG/1
20 TABLET, FILM COATED ORAL DAILY
COMMUNITY

## 2021-07-23 RX ORDER — SODIUM PHOSPHATE, DIBASIC AND SODIUM PHOSPHATE, MONOBASIC 7; 19 G/133ML; G/133ML
1 ENEMA RECTAL DAILY PRN
COMMUNITY

## 2021-07-23 RX ORDER — IPRATROPIUM BROMIDE AND ALBUTEROL SULFATE 2.5; .5 MG/3ML; MG/3ML
1 SOLUTION RESPIRATORY (INHALATION)
COMMUNITY

## 2021-07-23 RX ORDER — 0.9 % SODIUM CHLORIDE 0.9 %
1000 INTRAVENOUS SOLUTION INTRAVENOUS ONCE
Status: COMPLETED | OUTPATIENT
Start: 2021-07-23 | End: 2021-07-23

## 2021-07-23 RX ORDER — CITALOPRAM 20 MG/1
20 TABLET ORAL DAILY
COMMUNITY

## 2021-07-23 RX ORDER — INSULIN DETEMIR 100 [IU]/ML
45 INJECTION, SOLUTION SUBCUTANEOUS EVERY MORNING
Status: ON HOLD | COMMUNITY
End: 2021-07-30 | Stop reason: HOSPADM

## 2021-07-23 RX ORDER — MIRTAZAPINE 15 MG/1
15 TABLET, FILM COATED ORAL NIGHTLY
Status: DISCONTINUED | OUTPATIENT
Start: 2021-07-23 | End: 2021-08-04 | Stop reason: HOSPADM

## 2021-07-23 RX ORDER — IPRATROPIUM BROMIDE AND ALBUTEROL SULFATE 2.5; .5 MG/3ML; MG/3ML
3 SOLUTION RESPIRATORY (INHALATION) ONCE
Status: COMPLETED | OUTPATIENT
Start: 2021-07-23 | End: 2021-07-23

## 2021-07-23 RX ORDER — METOPROLOL TARTRATE 50 MG/1
50 TABLET, FILM COATED ORAL 2 TIMES DAILY
COMMUNITY
End: 2021-08-13 | Stop reason: ALTCHOICE

## 2021-07-23 RX ORDER — METHYLPREDNISOLONE SODIUM SUCCINATE 125 MG/2ML
125 INJECTION, POWDER, LYOPHILIZED, FOR SOLUTION INTRAMUSCULAR; INTRAVENOUS ONCE
Status: COMPLETED | OUTPATIENT
Start: 2021-07-23 | End: 2021-07-23

## 2021-07-23 RX ORDER — BISACODYL 10 MG
10 SUPPOSITORY, RECTAL RECTAL DAILY PRN
COMMUNITY

## 2021-07-23 RX ORDER — POLYETHYLENE GLYCOL 3350 17 G/17G
17 POWDER, FOR SOLUTION ORAL DAILY PRN
Status: DISCONTINUED | OUTPATIENT
Start: 2021-07-23 | End: 2021-08-04 | Stop reason: HOSPADM

## 2021-07-23 RX ORDER — ASCORBIC ACID 500 MG
500 TABLET ORAL 2 TIMES DAILY
COMMUNITY

## 2021-07-23 RX ORDER — HALOPERIDOL 5 MG/ML
2 INJECTION INTRAMUSCULAR ONCE
Status: COMPLETED | OUTPATIENT
Start: 2021-07-23 | End: 2021-07-23

## 2021-07-23 RX ORDER — CITALOPRAM 20 MG/1
20 TABLET ORAL DAILY
Status: CANCELLED | OUTPATIENT
Start: 2021-07-23

## 2021-07-23 RX ORDER — SODIUM CHLORIDE 0.9 % (FLUSH) 0.9 %
5-40 SYRINGE (ML) INJECTION EVERY 12 HOURS SCHEDULED
Status: DISCONTINUED | OUTPATIENT
Start: 2021-07-23 | End: 2021-08-04 | Stop reason: HOSPADM

## 2021-07-23 RX ORDER — ALPRAZOLAM 0.25 MG/1
0.25 TABLET ORAL 2 TIMES DAILY PRN
COMMUNITY

## 2021-07-23 RX ORDER — MIRTAZAPINE 15 MG/1
15 TABLET, FILM COATED ORAL NIGHTLY
COMMUNITY

## 2021-07-23 RX ORDER — ASPIRIN 81 MG/1
81 TABLET, CHEWABLE ORAL DAILY
Status: DISCONTINUED | OUTPATIENT
Start: 2021-07-23 | End: 2021-08-04 | Stop reason: HOSPADM

## 2021-07-23 RX ORDER — LEVETIRACETAM 250 MG/1
250 TABLET ORAL
COMMUNITY

## 2021-07-23 RX ORDER — AMLODIPINE BESYLATE 5 MG/1
5 TABLET ORAL DAILY
COMMUNITY

## 2021-07-23 RX ORDER — IPRATROPIUM BROMIDE AND ALBUTEROL SULFATE 2.5; .5 MG/3ML; MG/3ML
1 SOLUTION RESPIRATORY (INHALATION)
Status: DISCONTINUED | OUTPATIENT
Start: 2021-07-23 | End: 2021-08-04 | Stop reason: HOSPADM

## 2021-07-23 RX ORDER — DEXTROSE MONOHYDRATE 50 MG/ML
100 INJECTION, SOLUTION INTRAVENOUS PRN
Status: DISCONTINUED | OUTPATIENT
Start: 2021-07-23 | End: 2021-08-04 | Stop reason: HOSPADM

## 2021-07-23 RX ORDER — ASPIRIN 81 MG/1
81 TABLET, CHEWABLE ORAL DAILY
COMMUNITY

## 2021-07-23 RX ADMIN — GLUCAGON HYDROCHLORIDE 1 MG: KIT at 14:44

## 2021-07-23 RX ADMIN — CEFEPIME HYDROCHLORIDE 2000 MG: 2 INJECTION, POWDER, FOR SOLUTION INTRAVENOUS at 15:49

## 2021-07-23 RX ADMIN — IPRATROPIUM BROMIDE AND ALBUTEROL SULFATE 3 AMPULE: .5; 3 SOLUTION RESPIRATORY (INHALATION) at 11:58

## 2021-07-23 RX ADMIN — MIRTAZAPINE 15 MG: 15 TABLET, FILM COATED ORAL at 22:10

## 2021-07-23 RX ADMIN — INSULIN HUMAN 8 UNITS: 100 INJECTION, SOLUTION PARENTERAL at 14:42

## 2021-07-23 RX ADMIN — LORAZEPAM 1 MG: 2 INJECTION INTRAMUSCULAR; INTRAVENOUS at 14:06

## 2021-07-23 RX ADMIN — DEXTROSE MONOHYDRATE 25 G: 25 INJECTION, SOLUTION INTRAVENOUS at 14:41

## 2021-07-23 RX ADMIN — ENOXAPARIN SODIUM 30 MG: 30 INJECTION SUBCUTANEOUS at 22:10

## 2021-07-23 RX ADMIN — METOPROLOL TARTRATE 25 MG: 25 TABLET, FILM COATED ORAL at 22:10

## 2021-07-23 RX ADMIN — SODIUM CHLORIDE 1000 ML: 9 INJECTION, SOLUTION INTRAVENOUS at 14:13

## 2021-07-23 RX ADMIN — ASPIRIN 81 MG: 81 TABLET, CHEWABLE ORAL at 22:10

## 2021-07-23 RX ADMIN — Medication 10 ML: at 22:11

## 2021-07-23 RX ADMIN — HALOPERIDOL LACTATE 2 MG: 5 INJECTION INTRAMUSCULAR at 15:35

## 2021-07-23 RX ADMIN — Medication 1000 MG: at 14:12

## 2021-07-23 RX ADMIN — SODIUM BICARBONATE 50 MEQ: 84 INJECTION, SOLUTION INTRAVENOUS at 14:41

## 2021-07-23 RX ADMIN — METHYLPREDNISOLONE SODIUM SUCCINATE 125 MG: 125 INJECTION, POWDER, FOR SOLUTION INTRAMUSCULAR; INTRAVENOUS at 12:19

## 2021-07-23 RX ADMIN — FUROSEMIDE 40 MG: 40 INJECTION, SOLUTION INTRAMUSCULAR; INTRAVENOUS at 14:27

## 2021-07-23 ASSESSMENT — ENCOUNTER SYMPTOMS
ABDOMINAL PAIN: 0
VOMITING: 0
SHORTNESS OF BREATH: 1
BACK PAIN: 0
COUGH: 0

## 2021-07-23 ASSESSMENT — PAIN SCALES - GENERAL: PAINLEVEL_OUTOF10: 0

## 2021-07-23 NOTE — ED PROVIDER NOTES
throughout. Placed on nonrebreather mask. Improvement in pulse ox up to 95%. Abdominal:      General: Bowel sounds are normal.      Palpations: Abdomen is soft. Tenderness: There is no abdominal tenderness. Musculoskeletal:      Cervical back: Normal range of motion. Right lower leg: No edema. Left lower leg: No edema. Skin:     General: Skin is warm and dry. Comments: Bilateral lower extremity anterior tibial wounds. Neurological:      Mental Status: She is alert and oriented to person, place, and time. Motor: No weakness. Comments: Patient is moving all 4 extremities. Psychiatric:         Mood and Affect: Mood normal.         Behavior: Behavior normal.              Procedures     MDM   80year-old female patient presented to emergency department chief complaint of shortness of breath. Patient found to be hypoxic here. Upon arrival immediately treated with nonrebreather and duo nebs and steroids. Patient patient's lung sounds improved. Patient found to have elevated BNP and chest x-ray consistent with CHF. She is also found to be hyperkalemic. Hyperkalemia protocol initiated. With patient's recent history of being admitted to the hospital she was covered with IV antibiotics for pneumonia. She however does not have any fevers here and no leukocytosis. Patient's ABG is not significantly abnormal either. At this time she is to be admitted to telemetry for further management. EKG: This EKG is signed and interpreted by me. Rate:126  Rhythm: Sinus  Interpretation: severe motion artifact. Appears to be sinus. Left axis deviation  Comparison: Previous EKG showing right bundle branch block. EKG: This EKG is signed and interpreted by me. Rate: 52  Rhythm: Sinus  Interpretation:bradycardia, LAD  Comparison:improved rate    ED Course as of Jul 23 1726 Fri Jul 23, 2021   1414 Is combative not keeping her oxygen on. We will give patient some Ativan.     [FG] 1414 Initial potassium came back elevated 6.9. We received a call from the lab saying they did not appropriately spin a sample before running it. They will be run the sample and call us back. [FG]      ED Course User Index  [FG] Reginald López DO     ABG Procedure Note  Indication: Physcian skill needed    Procedure: The patient was placed in the appropriate position and the skin over the puncture site was prepped with betadine and draped in a sterile fashion. Right radial artery accessed. Procedure was preformed under live ultrasound for guidance. The patient tolerated the procedure well. Complications: None    ED Course as of Jul 23 1726 Fri Jul 23, 2021   1414 Is combative not keeping her oxygen on. We will give patient some Ativan. [FG]   6302 Initial potassium came back elevated 6.9. We received a call from the lab saying they did not appropriately spin a sample before running it. They will be run the sample and call us back. [FG]      ED Course User Index  [FG] Reginald López DO       --------------------------------------------- PAST HISTORY ---------------------------------------------  Past Medical History:  has a past medical history of Acute kidney failure ([facility] Alzheimer disease ([facility] Anxiety, Dementia with behavioral disturbance ([facility] Diabetes mellitus ([facility] Dysphagia, Falls, Fluid overload, Heart failure ([facility] Hyperkalemia, Hyperlipidemia, Hypertension, Major depressive disorder, Muscle wasting and atrophy, NEC, left ankle and foot, Muscle weakness, Nonthrombocytopenic purpura ([address] 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 Past Surgical History:  has no past surgical history on file. Social History:  reports that she has quit smoking. She has never used smokeless tobacco. She reports previous alcohol use. She reports that she does not use drugs. Family History: family history is not on file.      The patients home medications have been reviewed.     Allergies: Penicillins    -------------------------------------------------- RESULTS -------------------------------------------------    LABS:  Results for orders placed or performed during the hospital encounter of 07/23/21   COVID-19, Rapid    Specimen: Nasopharyngeal Swab   Result Value Ref Range    SARS-CoV-2, NAAT Not Detected Not Detected   CBC Auto Differential   Result Value Ref Range    WBC 9.5 4.5 - 11.5 E9/L    RBC 3.59 3.50 - 5.50 E12/L    Hemoglobin 10.2 (L) 11.5 - 15.5 g/dL    Hematocrit 33.6 (L) 34.0 - 48.0 %    MCV 93.6 80.0 - 99.9 fL    MCH 28.4 26.0 - 35.0 pg    MCHC 30.4 (L) 32.0 - 34.5 %    RDW 17.9 (H) 11.5 - 15.0 fL    Platelets 457 365 - 695 E9/L    MPV 10.3 7.0 - 12.0 fL    Neutrophils % 77.5 43.0 - 80.0 %    Immature Granulocytes % 1.2 0.0 - 5.0 %    Lymphocytes % 12.9 (L) 20.0 - 42.0 %    Monocytes % 7.1 2.0 - 12.0 %    Eosinophils % 0.8 0.0 - 6.0 %    Basophils % 0.5 0.0 - 2.0 %    Neutrophils Absolute 7.33 (H) 1.80 - 7.30 E9/L    Immature Granulocytes # 0.11 E9/L    Lymphocytes Absolute 1.22 (L) 1.50 - 4.00 E9/L    Monocytes Absolute 0.67 0.10 - 0.95 E9/L    Eosinophils Absolute 0.08 0.05 - 0.50 E9/L    Basophils Absolute 0.05 0.00 - 0.20 E9/L   Comprehensive Metabolic Panel w/ Reflex to MG   Result Value Ref Range    Sodium SEE NOTE (AA) 132 - 146 mmol/L    Potassium reflex Magnesium SEE NOTE (AA) 3.5 - 5.0 mmol/L    Chloride SEE NOTE (AA) 98 - 107 mmol/L    CO2 SEE NOTE (AA) 22 - 29 mmol/L    Anion Gap SEE NOTE (AA) 7 - 16 mmol/L    Glucose SEE NOTE (AA) 74 - 99 mg/dL    BUN SEE NOTE (AA) 6 - 23 mg/dL    CREATININE SEE NOTE (AA) 0.5 - 1.0 mg/dL    GFR Non-African American SEE NOTE (AA) >=60 mL/min/1.73    GFR  SEE NOTE (AA)     Calcium SEE NOTE (AA) 8.6 - 10.2 mg/dL    Total Protein SEE NOTE (AA) 6.4 - 8.3 g/dL    Albumin SEE NOTE (AA) 3.5 - 5.2 g/dL    Total Bilirubin SEE NOTE (AA) 0.0 - 1.2 mg/dL    Alkaline Phosphatase SEE NOTE (AA) 35 - 104 U/L    ALT SEE NOTE (AA) 0 - 32 U/L    AST SEE NOTE (AA) 0 - 31 U/L   Troponin   Result Value Ref Range    Troponin, High Sensitivity SEE NOTE (AA) 0 - 9 ng/L   Brain Natriuretic Peptide   Result Value Ref Range    Pro-BNP see note (AA) 0 - 450 pg/mL   Lactate, Sepsis   Result Value Ref Range    Lactic Acid, Sepsis 1.3 0.5 - 1.9 mmol/L   Blood Gas, Arterial   Result Value Ref Range    Date Analyzed [id number]     Time Analyzed 1211     Source: Blood Arterial     pH, Blood Gas 7.377 7.350 - 7.450    PCO2 38.6 35.0 - 45.0 mmHg    PO2 53.1 (L) 75.0 - 100.0 mmHg    HCO3 22.2 22.0 - 26.0 mmol/L    B.E. -2.7 -3.0 - 3.0 mmol/L    O2 Sat 85.5 (L) 92.0 - 98.5 %    O2Hb 84.5 (L) 94.0 - 97.0 %    COHb 0.9 0.0 - 1.5 %    MetHb 0.3 0.0 - 1.5 %    O2 Content 13.0 mL/dL    HHb 14.3 (H) 0.0 - 5.0 %    tHb (est) 10.9 (L) 11.5 - 16.5 g/dL    Mode 8L AFM     Date Of Collection      Time Collected      Pt Temp 37.0 C     ID [id number]     Lab [id number]     Critical(s) Notified .  No Critical Values    Brain Natriuretic Peptide   Result Value Ref Range    Pro-BNP 7,692 (H) 0 - 450 pg/mL   Troponin   Result Value Ref Range    Troponin, High Sensitivity 58 (H) 0 - 9 ng/L   Comprehensive Metabolic Panel w/ Reflex to MG   Result Value Ref Range    Sodium 136 132 - 146 mmol/L    Potassium reflex Magnesium 5.9 (H) 3.5 - 5.0 mmol/L    Chloride 97 (L) 98 - 107 mmol/L    CO2 24 22 - 29 mmol/L    Anion Gap 15 7 - 16 mmol/L    Glucose 132 (H) 74 - 99 mg/dL    BUN 84 (H) 6 - 23 mg/dL    CREATININE 3.4 (H) 0.5 - 1.0 mg/dL    GFR Non-African American 13 >=60 mL/min/1.73    GFR African American 15     Calcium 8.6 8.6 - 10.2 mg/dL    Total Protein 7.1 6.4 - 8.3 g/dL    Albumin 3.5 3.5 - 5.2 g/dL    Total Bilirubin 0.3 0.0 - 1.2 mg/dL    Alkaline Phosphatase 248 (H) 35 - 104 U/L    ALT 36 (H) 0 - 32 U/L    AST 36 (H) 0 - 31 U/L   POCT Venous   Result Value Ref Range    POC Sodium 135 132 - 146 mmol/L    POC Potassium 5.6 (H) 3.5 - 5.0 mmol/L    POC Chloride 101 100 - 108 mmol/L    POC Glucose 154 (H) 74 - 99 mg/dl    POC Creatinine 3.9 (H) 0.5 - 1.0 mg/dL    GFR Non-African American 11 >=60 mL/min/1.73    GFR  13     Performed on SEE BELOW    EKG 12 Lead   Result Value Ref Range    Ventricular Rate 52 BPM    Atrial Rate 52 BPM    P-R Interval 232 ms    QRS Duration 146 ms    Q-T Interval 522 ms    QTc Calculation (Bazett) 485 ms    P Axis 77 degrees    R Axis -50 degrees    T Axis -75 degrees       RADIOLOGY:  XR CHEST PORTABLE   Final Result   Opacities in perihilar locations and lung bases related to bilateral   pneumonia or atelectasis and likely bilateral pleural effusions. CT Head WO Contrast    (Results Pending)   CT CHEST WO CONTRAST    (Results Pending)         ------------------------- NURSING NOTES AND VITALS REVIEWED ---------------------------  Date / Time Roomed:  7/23/2021 11:33 AM  ED Bed Assignment:  24/24    The nursing notes within the ED encounter and vital signs as below have been reviewed. Patient Vitals for the past 24 hrs:   BP Temp Temp src Pulse Resp SpO2 Weight   07/23/21 1715 133/78 98 °F (36.7 °C) Temporal 58 18 94 % --   07/23/21 1448 (!) 148/55 -- -- 60 18 94 % --   07/23/21 1135 (!) 139/110 98.9 °F (37.2 °C) -- 56 20 94 % 176 lb (79.8 kg)       Oxygen Saturation Interpretation: Normal    ------------------------------------------ PROGRESS NOTES ------------------------------------------  Re-evaluation(s):  Time: 3pm  Patients symptoms are improving  Repeat physical examination is improved    Counseling:  I have spoken with the patient and discussed todays results, in addition to providing specific details for the plan of care and counseling regarding the diagnosis and prognosis.   Their questions are answered at this time and they are agreeable with the plan of admission.    --------------------------------- ADDITIONAL PROVIDER NOTES ---------------------------------  Consultations:  Time: 4pm. Shabnam

## 2021-07-23 NOTE — ED NOTES
Marisela Faye from [facility] notified that pt to go to room upstairs after CT scan     Marguerite Serrato RN  07/23/21 [id number]

## 2021-07-23 NOTE — ED NOTES
DR MENDEZ NOTIFIED OF POTASSIUM OF 5.6 FROM POC CHEM, TOLD TO ADMINISTER CALCIUM AND HOLD ALL OTHER MEDS UNTIL CMP RESULTS      Nigel Lozano RN  07/23/21 [id number]

## 2021-07-23 NOTE — ED NOTES
Spoke to Leslie Glover in [address], notified that pt is now medicated and ready for Ger Acosta RN  07/23/21 6923

## 2021-07-24 ENCOUNTER — APPOINTMENT (OUTPATIENT)
Dept: ULTRASOUND IMAGING | Age: 86
DRG: 291 | End: 2021-07-24
Payer: COMMERCIAL

## 2021-07-24 LAB
ALBUMIN SERPL-MCNC: 2.8 G/DL (ref 3.5–5.2)
ALP BLD-CCNC: 172 U/L (ref 35–104)
ALT SERPL-CCNC: 24 U/L (ref 0–32)
ANION GAP SERPL CALCULATED.3IONS-SCNC: 11 MMOL/L (ref 7–16)
ANION GAP SERPL CALCULATED.3IONS-SCNC: 14 MMOL/L (ref 7–16)
ANISOCYTOSIS: ABNORMAL
AST SERPL-CCNC: 18 U/L (ref 0–31)
B.E.: -0.7 MMOL/L (ref -3–3)
BACTERIA: ABNORMAL /HPF
BASOPHILIC STIPPLING: ABNORMAL
BASOPHILS ABSOLUTE: 0 E9/L (ref 0–0.2)
BASOPHILS RELATIVE PERCENT: 0.1 % (ref 0–2)
BILIRUB SERPL-MCNC: <0.2 MG/DL (ref 0–1.2)
BILIRUBIN URINE: NEGATIVE
BLOOD, URINE: ABNORMAL
BUN BLDV-MCNC: 81 MG/DL (ref 6–23)
BUN BLDV-MCNC: 84 MG/DL (ref 6–23)
CALCIUM IONIZED: 1.17 MMOL/L (ref 1.15–1.33)
CALCIUM SERPL-MCNC: 8.2 MG/DL (ref 8.6–10.2)
CALCIUM SERPL-MCNC: 9 MG/DL (ref 8.6–10.2)
CHLORIDE BLD-SCNC: 100 MMOL/L (ref 98–107)
CHLORIDE BLD-SCNC: 97 MMOL/L (ref 98–107)
CLARITY: CLEAR
CO2: 27 MMOL/L (ref 22–29)
CO2: 28 MMOL/L (ref 22–29)
COHB: 0.7 % (ref 0–1.5)
COLOR: YELLOW
CREAT SERPL-MCNC: 3 MG/DL (ref 0.5–1)
CREAT SERPL-MCNC: 3.1 MG/DL (ref 0.5–1)
CREATININE URINE: 27 MG/DL (ref 29–226)
CREATININE URINE: 27 MG/DL (ref 29–226)
CRITICAL: ABNORMAL
DATE ANALYZED: ABNORMAL
DATE OF COLLECTION: ABNORMAL
EKG ATRIAL RATE: 52 BPM
EKG ATRIAL RATE: 93 BPM
EKG P AXIS: 77 DEGREES
EKG P-R INTERVAL: 232 MS
EKG Q-T INTERVAL: 356 MS
EKG Q-T INTERVAL: 522 MS
EKG QRS DURATION: 146 MS
EKG QRS DURATION: 84 MS
EKG QTC CALCULATION (BAZETT): 485 MS
EKG QTC CALCULATION (BAZETT): 515 MS
EKG R AXIS: -28 DEGREES
EKG R AXIS: -50 DEGREES
EKG T AXIS: -75 DEGREES
EKG T AXIS: 112 DEGREES
EKG VENTRICULAR RATE: 126 BPM
EKG VENTRICULAR RATE: 52 BPM
EOSINOPHILS ABSOLUTE: 0 E9/L (ref 0.05–0.5)
EOSINOPHILS RELATIVE PERCENT: 0 % (ref 0–6)
GFR AFRICAN AMERICAN: 17
GFR AFRICAN AMERICAN: 18
GFR NON-AFRICAN AMERICAN: 14 ML/MIN/1.73
GFR NON-AFRICAN AMERICAN: 15 ML/MIN/1.73
GLUCOSE BLD-MCNC: 226 MG/DL (ref 74–99)
GLUCOSE BLD-MCNC: 310 MG/DL (ref 74–99)
GLUCOSE URINE: NEGATIVE MG/DL
HCO3: 25.7 MMOL/L (ref 22–26)
HCT VFR BLD CALC: 29 % (ref 34–48)
HEMOGLOBIN: 8.7 G/DL (ref 11.5–15.5)
HHB: 4 % (ref 0–5)
IRON SATURATION: 16 % (ref 15–50)
IRON: 39 MCG/DL (ref 37–145)
KETONES, URINE: NEGATIVE MG/DL
LAB: ABNORMAL
LEUKOCYTE ESTERASE, URINE: ABNORMAL
LYMPHOCYTES ABSOLUTE: 0.22 E9/L (ref 1.5–4)
LYMPHOCYTES RELATIVE PERCENT: 2.6 % (ref 20–42)
Lab: ABNORMAL
MCH RBC QN AUTO: 28.3 PG (ref 26–35)
MCHC RBC AUTO-ENTMCNC: 30 % (ref 32–34.5)
MCV RBC AUTO: 94.5 FL (ref 80–99.9)
METER GLUCOSE: 234 MG/DL (ref 74–99)
METER GLUCOSE: 268 MG/DL (ref 74–99)
METER GLUCOSE: 305 MG/DL (ref 74–99)
METER GLUCOSE: 312 MG/DL (ref 74–99)
METER GLUCOSE: 331 MG/DL (ref 74–99)
METHB: 0.4 % (ref 0–1.5)
MICROALBUMIN UR-MCNC: 15.7 MG/L
MICROALBUMIN/CREAT UR-RTO: 58.1 (ref 0–30)
MODE: ABNORMAL
MONOCYTES ABSOLUTE: 0 E9/L (ref 0.1–0.95)
MONOCYTES RELATIVE PERCENT: 1.8 % (ref 2–12)
NEUTROPHILS ABSOLUTE: 7.08 E9/L (ref 1.8–7.3)
NEUTROPHILS RELATIVE PERCENT: 97.4 % (ref 43–80)
NITRITE, URINE: NEGATIVE
NUCLEATED RED BLOOD CELLS: 0.9 /100 WBC
O2 CONTENT: 15.2 ML/DL
O2 SATURATION: 96 % (ref 92–98.5)
O2HB: 94.9 % (ref 94–97)
OPERATOR ID: 1741
OSMOLALITY URINE: 347 MOSM/KG (ref 300–900)
OVALOCYTES: ABNORMAL
PATIENT TEMP: 37 C
PCO2: 49.9 MMHG (ref 35–45)
PDW BLD-RTO: 17.6 FL (ref 11.5–15)
PH BLOOD GAS: 7.33 (ref 7.35–7.45)
PH UA: 5 (ref 5–9)
PLATELET # BLD: 263 E9/L (ref 130–450)
PMV BLD AUTO: 10.3 FL (ref 7–12)
PO2: 87.5 MMHG (ref 75–100)
POIKILOCYTES: ABNORMAL
POLYCHROMASIA: ABNORMAL
POTASSIUM REFLEX MAGNESIUM: 5.1 MMOL/L (ref 3.5–5)
POTASSIUM SERPL-SCNC: 4.8 MMOL/L (ref 3.5–5)
PROCALCITONIN: 0.19 NG/ML (ref 0–0.08)
PROTEIN PROTEIN: 5 MG/DL (ref 0–12)
PROTEIN UA: NEGATIVE MG/DL
PROTEIN/CREAT RATIO: 0.2
PROTEIN/CREAT RATIO: 0.2 (ref 0–0.2)
RBC # BLD: 3.07 E12/L (ref 3.5–5.5)
RBC UA: ABNORMAL /HPF (ref 0–2)
REASON FOR REJECTION: NORMAL
REJECTED TEST: NORMAL
SODIUM BLD-SCNC: 138 MMOL/L (ref 132–146)
SODIUM BLD-SCNC: 139 MMOL/L (ref 132–146)
SOURCE, BLOOD GAS: ABNORMAL
SPECIFIC GRAVITY UA: 1.01 (ref 1–1.03)
THB: 11.3 G/DL (ref 11.5–16.5)
TIME ANALYZED: 1530
TOTAL IRON BINDING CAPACITY: 247 MCG/DL (ref 250–450)
TOTAL PROTEIN: 5.6 G/DL (ref 6.4–8.3)
UREA NITROGEN, UR: 255 MG/DL (ref 800–1666)
UROBILINOGEN, URINE: 0.2 E.U./DL
WBC # BLD: 7.3 E9/L (ref 4.5–11.5)
WBC UA: ABNORMAL /HPF (ref 0–5)

## 2021-07-24 PROCEDURE — 85025 COMPLETE CBC W/AUTO DIFF WBC: CPT

## 2021-07-24 PROCEDURE — 2700000000 HC OXYGEN THERAPY PER DAY

## 2021-07-24 PROCEDURE — 36415 COLL VENOUS BLD VENIPUNCTURE: CPT

## 2021-07-24 PROCEDURE — 80048 BASIC METABOLIC PNL TOTAL CA: CPT

## 2021-07-24 PROCEDURE — 83935 ASSAY OF URINE OSMOLALITY: CPT

## 2021-07-24 PROCEDURE — 99222 1ST HOSP IP/OBS MODERATE 55: CPT | Performed by: STUDENT IN AN ORGANIZED HEALTH CARE EDUCATION/TRAINING PROGRAM

## 2021-07-24 PROCEDURE — 2580000003 HC RX 258: Performed by: INTERNAL MEDICINE

## 2021-07-24 PROCEDURE — 76770 US EXAM ABDO BACK WALL COMP: CPT

## 2021-07-24 PROCEDURE — 84145 PROCALCITONIN (PCT): CPT

## 2021-07-24 PROCEDURE — 80053 COMPREHEN METABOLIC PANEL: CPT

## 2021-07-24 PROCEDURE — 6370000000 HC RX 637 (ALT 250 FOR IP): Performed by: INTERNAL MEDICINE

## 2021-07-24 PROCEDURE — 6360000002 HC RX W HCPCS: Performed by: INTERNAL MEDICINE

## 2021-07-24 PROCEDURE — 82805 BLOOD GASES W/O2 SATURATION: CPT

## 2021-07-24 PROCEDURE — 82962 GLUCOSE BLOOD TEST: CPT

## 2021-07-24 PROCEDURE — 84156 ASSAY OF PROTEIN URINE: CPT

## 2021-07-24 PROCEDURE — 83540 ASSAY OF IRON: CPT

## 2021-07-24 PROCEDURE — 82570 ASSAY OF URINE CREATININE: CPT

## 2021-07-24 PROCEDURE — 83550 IRON BINDING TEST: CPT

## 2021-07-24 PROCEDURE — 82330 ASSAY OF CALCIUM: CPT

## 2021-07-24 PROCEDURE — 81001 URINALYSIS AUTO W/SCOPE: CPT

## 2021-07-24 PROCEDURE — 2140000000 HC CCU INTERMEDIATE R&B

## 2021-07-24 PROCEDURE — 93010 ELECTROCARDIOGRAM REPORT: CPT | Performed by: INTERNAL MEDICINE

## 2021-07-24 PROCEDURE — 82044 UR ALBUMIN SEMIQUANTITATIVE: CPT

## 2021-07-24 PROCEDURE — 84540 ASSAY OF URINE/UREA-N: CPT

## 2021-07-24 RX ORDER — INSULIN GLARGINE 100 [IU]/ML
20 INJECTION, SOLUTION SUBCUTANEOUS NIGHTLY
Status: DISCONTINUED | OUTPATIENT
Start: 2021-07-24 | End: 2021-07-29

## 2021-07-24 RX ADMIN — SODIUM ZIRCONIUM CYCLOSILICATE 5 G: 5 POWDER, FOR SUSPENSION ORAL at 00:17

## 2021-07-24 RX ADMIN — INSULIN LISPRO 6 UNITS: 100 INJECTION, SOLUTION INTRAVENOUS; SUBCUTANEOUS at 16:27

## 2021-07-24 RX ADMIN — LEVETIRACETAM 250 MG: 500 TABLET ORAL at 07:35

## 2021-07-24 RX ADMIN — FUROSEMIDE 40 MG: 10 INJECTION, SOLUTION INTRAMUSCULAR; INTRAVENOUS at 15:05

## 2021-07-24 RX ADMIN — Medication 10 ML: at 22:12

## 2021-07-24 RX ADMIN — ENOXAPARIN SODIUM 30 MG: 30 INJECTION SUBCUTANEOUS at 22:12

## 2021-07-24 RX ADMIN — Medication 10 ML: at 08:57

## 2021-07-24 RX ADMIN — ASPIRIN 81 MG: 81 TABLET, CHEWABLE ORAL at 08:57

## 2021-07-24 RX ADMIN — SODIUM ZIRCONIUM CYCLOSILICATE 5 G: 5 POWDER, FOR SUSPENSION ORAL at 22:11

## 2021-07-24 RX ADMIN — INSULIN LISPRO 5 UNITS: 100 INJECTION, SOLUTION INTRAVENOUS; SUBCUTANEOUS at 22:12

## 2021-07-24 RX ADMIN — FUROSEMIDE 40 MG: 10 INJECTION, SOLUTION INTRAMUSCULAR; INTRAVENOUS at 08:57

## 2021-07-24 RX ADMIN — INSULIN LISPRO 8 UNITS: 100 INJECTION, SOLUTION INTRAVENOUS; SUBCUTANEOUS at 11:10

## 2021-07-24 RX ADMIN — SODIUM ZIRCONIUM CYCLOSILICATE 5 G: 5 POWDER, FOR SUSPENSION ORAL at 07:35

## 2021-07-24 RX ADMIN — INSULIN LISPRO 8 UNITS: 100 INJECTION, SOLUTION INTRAVENOUS; SUBCUTANEOUS at 08:57

## 2021-07-24 RX ADMIN — METOPROLOL TARTRATE 25 MG: 25 TABLET, FILM COATED ORAL at 22:11

## 2021-07-24 RX ADMIN — METOPROLOL TARTRATE 25 MG: 25 TABLET, FILM COATED ORAL at 08:58

## 2021-07-24 RX ADMIN — INSULIN LISPRO 2 UNITS: 100 INJECTION, SOLUTION INTRAVENOUS; SUBCUTANEOUS at 00:15

## 2021-07-24 RX ADMIN — MIRTAZAPINE 15 MG: 15 TABLET, FILM COATED ORAL at 22:11

## 2021-07-24 RX ADMIN — INSULIN GLARGINE 20 UNITS: 100 INJECTION, SOLUTION SUBCUTANEOUS at 22:13

## 2021-07-24 ASSESSMENT — PAIN SCALES - GENERAL
PAINLEVEL_OUTOF10: 0

## 2021-07-24 NOTE — PROGRESS NOTES
Bedside nurse hand off. Family at bedside. Patient placed on 15L NRB due to low oxygen sats. Pulmonary paged by day shift RN. Patient denies any SOB. Patients oxygen recovered to 95%. Pulmonary up to see the patient and updates given to daughter at bedside. Will continue to monitor. ABG drawn. Pulmonary aware of results.

## 2021-07-24 NOTE — H&P
[id number] [facility]  History and Physical      CHIEF COMPLAINT:    Chief Complaint   Patient presents with    Shortness of Breath     +pneumonia, denies cp        Patient of Evelyn Sykes DO presents with:  Acute congestive heart failure ([facility] [address]    History of Present Illness: The patient is extremely demented. She is not able to provide any history whatsoever. She was sent here due to shortness of breath. She was found to have pretty extensive pleural effusions and congestive heart failure. I am unable to get any history from her. REVIEW OF SYSTEMS:  Pertinent negatives are above in HPI. 10 point ROS otherwise negative. 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 No pertinent surgical history.     Medications Prior to Admission:    Medications Prior to Admission: acetaminophen (TYLENOL) 325 MG tablet, Take 650 mg by mouth every 6 hours as needed for Pain  aspirin 81 MG chewable tablet, Take 81 mg by mouth daily  bumetanide (BUMEX) 0.5 MG tablet, Take 0.5 mg by mouth daily  exenatide (BYETTA 5 MCG PEN) 5 MCG/0.02ML injection, Inject 5 mcg into the skin every morning (before breakfast)  citalopram (CELEXA) 20 MG tablet, Take 20 mg by mouth daily  doxycycline hyclate (VIBRAMYCIN) 100 MG capsule, Take 100 mg by mouth 2 times daily Started on 7/22/21  bisacodyl (DULCOLAX) 10 MG suppository, Place 10 mg rectally daily as needed for Constipation  ipratropium-albuterol (DUONEB) 0.5-2.5 (3) MG/3ML SOLN nebulizer solution, Inhale 1 vial into the lungs every 2 hours as needed for Shortness of Breath  ferrous sulfate (IRON 325) 325 (65 Fe) MG tablet, Take 325 mg by mouth daily (with breakfast)  glucagon 1 MG injection, Inject 1 kit into the muscle as needed (hypoglycemia)  insulin lispro (HUMALOG) 100 UNIT/ML injection vial, Inject 5 Units into the skin 3 times daily (before meals)  insulin lispro (HUMALOG) 100 UNIT/ML injection vial, Inject 0-9 Units into the skin 2 times daily (before meals) Per sliding scale  0-200 = 0 units 201-250 = 3 units 251-300 = 4 units 301-350 = 6 units 351-400 = 9 units  levETIRAcetam (KEPPRA) 250 MG tablet, Take 250 mg by mouth every morning (before breakfast)  insulin detemir (LEVEMIR FLEXTOUCH) 100 UNIT/ML injection pen, Inject 10 Units into the skin nightly  insulin detemir (LEVEMIR FLEXTOUCH) 100 UNIT/ML injection pen, Inject 45 Units into the skin every morning  linagliptin (TRADJENTA) 5 MG tablet, Take 5 mg by mouth daily  atorvastatin (LIPITOR) 20 MG tablet, Take 20 mg by mouth daily  metOLazone (ZAROXOLYN) 2.5 MG tablet, Take 2.5 mg by mouth daily Give 30 minutes prior to Bumex  metoprolol tartrate (LOPRESSOR) 50 MG tablet, Take 50 mg by mouth 2 times daily  magnesium hydroxide (MILK OF MAGNESIA) 400 MG/5ML suspension, Take 30 mLs by mouth daily as needed for Constipation  mirtazapine (REMERON) 15 MG tablet, Take 15 mg by mouth nightly  Multiple Vitamins-Minerals (THERAPEUTIC MULTIVITAMIN-MINERALS) tablet, Take 1 tablet by mouth daily  amLODIPine (NORVASC) 5 MG tablet, Take 5 mg by mouth daily  dextromethorphan-quiNIDine (NUEDEXTA) 20-10 MG CAPS per capsule, Take 1 capsule by mouth 2 times daily  senna (SENOKOT) 8.6 MG tablet, Take 1 tablet by mouth daily as needed for Constipation  ascorbic acid (VITAMIN C) 500 MG tablet, Take 500 mg by mouth 2 times daily  ALPRAZolam (XANAX) 0.25 MG tablet, Take 0.25 mg by mouth 2 times daily as needed for Anxiety. Sodium Phosphates (FLEET) 7-19 GM/118ML, Place 1 enema rectally daily as needed  vitamin D (CHOLECALCIFEROL) 16826 UNIT CAPS, Take 50,000 Units by mouth once a week Given Fridays    Note that the patient's home medications were reviewed and the above list is accurate to the best of my knowledge at the time of the exam.    Allergies:    Penicillins    Social History:    reports that she has quit smoking. She has never used smokeless tobacco. She reports previous alcohol use. She reports that she does not use drugs. Family History:   Unable to obtain      PHYSICAL EXAM:    Vitals:  /60   Pulse 58   Temp 96.5 °F (35.8 °C) (Tympanic)   Resp 14   Ht 5' 4\" (1.626 m)   Wt 176 lb 5.9 oz (80 kg)   SpO2 92%   BMI 30.27 kg/m²       General appearance: Incredibly frail and demented  Eyes: Sclerae anicteric  HEENT: AT/NC, MMM  Neck: FROM, supple, no thyromegaly  Lymph: No cervical / supraclavicular lymphadenopathy  Lungs: B/l rales anteriorly, WOB normal  CV: RRR, 1/6 NEREIDA radiating to neck, no lower extremity edema  Abdomen: Soft, non-tender; no masses or HSM, +BS  Extremities: FROM without synovitis. No clubbing or cyanosis of the hands. Skin: Multiple wounds of b/l legs which don't appear infected  Psych: Agitated and tries to kick me. Little to no judgement/insight. Neuro: Lethargic    LABS:  All labs reviewed.   Of note:  CBC with Differential:    Lab Results   Component Value Date    WBC 7.3 07/24/2021    RBC 3.07 07/24/2021    HGB 8.7 07/24/2021    HCT 29.0 07/24/2021     07/24/2021    MCV 94.5 07/24/2021    MCH 28.3 07/24/2021    MCHC 30.0 07/24/2021    RDW 17.6 07/24/2021    NRBC 0.9 07/24/2021    SEGSPCT 68.0 06/08/2021    LYMPHOPCT 2.6 07/24/2021    LYMPHOPCT 28.8 03/28/2018    MONOPCT 1.8 07/24/2021    BASOPCT 0.1 07/24/2021    MONOSABS 0.00 07/24/2021    LYMPHSABS 0.22 07/24/2021    EOSABS 0.00 07/24/2021    BASOSABS 0.00 07/24/2021     CMP:    Lab Results   Component Value Date     07/24/2021    K 5.1 07/24/2021     07/24/2021    CO2 27 07/24/2021    BUN 84 07/24/2021    CREATININE 3.1 07/24/2021    GFRAA 17 07/24/2021    LABGLOM 14 07/24/2021    LABGLOM 33 03/28/2018    GLUCOSE 310 07/24/2021    GLUCOSE 263 03/28/2018    PROT 5.6 07/24/2021    LABALBU 2.8 07/24/2021    LABALBU 3.0 03/28/2018    CALCIUM 8.2 07/24/2021    BILITOT <0.2 07/24/2021    BILITOT NEGATIVE 10/03/2017    ALKPHOS 172 07/24/2021    AST 18 07/24/2021    ALT 24 07/24/2021       Imaging:  I've personally reviewed the patient's CT chest  1. Moderate pleural effusions, right larger than left.  Left pleural effusion   is partially loculated. 2. Right lower lobe consolidation and volume loss, likely atelectasis   although superimposed pneumonia/infiltrate not excluded. 3. Mild interstitial thickening probably represents mild interstitial edema. EKG:  I've personally reviewed the patient's EKG:  Sinus zeeshan 1st deg AVB LBBB low voltages no acute ischemic changes. No clear hyperK changes      ASSESSMENT/PLAN:  Principal Problem:    Acute congestive heart failure (HCC)  Active Problems:    Stage 4 chronic kidney disease (HCC)    Type 2 diabetes mellitus without complication, with long-term current use of insulin (HCC)    Hypertension    Vascular dementia without behavioral disturbance (HCC)    Prolonged Q-T interval on ECG    Acute hypoxemic respiratory failure (HCC)    Acute kidney injury superimposed on CKD ([address]  Resolved Problems:    * No resolved hospital problems. *      Severe CHF    Stat echo still not done    Pulm consult    Hyperkalemia improving, continue lokelma    Lasix 40 IV BID    She seems tremendously demented and I don't think she's going to do well    Palliative consult    Glucose poorly controlled.   Add glargine and increase SSI    Code status: Full  Requires inpatient level of care  Melissa Antony MD    12:41 PM  7/24/2021

## 2021-07-24 NOTE — CONSULTS
Department of Internal Medicine  Nephrology Consult Note      Reason for Consult:  Hyperkalemia and ANGELA on CKD  Requesting Physician:  Madiha Lerner MD    CHIEF COMPLAINT:  Shortness of breath    History Obtained From:  patient, electronic medical record    HISTORY OF PRESENT ILLNESS:  Briefly, Mrs. Shannan Harris is a 80year old female with a past medical history of CKD Stage III baseline creatinine 2.5-2.7mg/dL, Anemia, Diabetes Mellitus type 2, Alzheimer's, Hypertension, CHF, who presented to the ER on 7/23/21 with complaints of shortness of breath. Upon arrival to the ER she was found to be hypoxic and was placed on nonrebreather and treated with duo nebs and steroids. Patient's daughter states she was diagnosed 2  Weeks ago with Pneumonia and was admitted to the hospital. She was discharged to a skilled nursing facility where she was noncompliant with her medications and inhalers. She was also found to be hyperkalemic and hyperkalemic protocol was iniated. Pertinent labs include: potassium: 5.9 mmol/L, BUN 81, Creatinine 3.1 mg/dL, Anion gap 18, CO2 21, Pro- BNP 7,692. Chest Xray was consistent with CHF exacerbation. Pertinent home mediations include Bumex, Metolazone, Amlodipine, Metoprolol. Renal was consulted for Hyperkalemia and ANGELA on CKD. Past Medical History:        Diagnosis Date    Acute kidney failure ([address]     Alzheimer disease ([facility] [address]     Anxiety     Dementia with behavioral disturbance ([facility] [address]     Diabetes mellitus ([facility] [address]     type 2    Dysphagia     Falls     Fluid overload     Heart failure (HCC)     Hyperkalemia     Hyperlipidemia     Hypertension     Major depressive disorder     Muscle wasting and atrophy, NEC, left ankle and foot     Muscle weakness     Nonthrombocytopenic purpura (HCC)     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 No pertinent surgical history.   Current Medications:    Current Facility-Administered Medications: sodium zirconium cyclosilicate (LOKELMA) oral suspension 5 g, 5 g, Oral, BID  insulin lispro (HUMALOG) injection vial 0-12 Units, 0-12 Units, Subcutaneous, TID WC  insulin lispro (HUMALOG) injection vial 0-6 Units, 0-6 Units, Subcutaneous, Nightly  vancomycin 1500 mg in dextrose 5% 300 mL IVPB, 20 mg/kg, Intravenous, Once  perflutren lipid microspheres (DEFINITY) injection 1.65 mg, 1.5 mL, Intravenous, ONCE PRN  furosemide (LASIX) injection 40 mg, 40 mg, Intravenous, 2 times per day  aspirin chewable tablet 81 mg, 81 mg, Oral, Daily  ipratropium-albuterol (DUONEB) nebulizer solution 3 mL, 1 vial, Inhalation, Q2H PRN  levETIRAcetam (KEPPRA) tablet 250 mg, 250 mg, Oral, QAM AC  metoprolol tartrate (LOPRESSOR) tablet 25 mg, 25 mg, Oral, BID  mirtazapine (REMERON) tablet 15 mg, 15 mg, Oral, Nightly  sodium chloride flush 0.9 % injection 5-40 mL, 5-40 mL, Intravenous, 2 times per day  sodium chloride flush 0.9 % injection 5-40 mL, 5-40 mL, Intravenous, PRN  0.9 % sodium chloride infusion, 25 mL, Intravenous, PRN  polyethylene glycol (GLYCOLAX) packet 17 g, 17 g, Oral, Daily PRN  acetaminophen (TYLENOL) tablet 650 mg, 650 mg, Oral, Q6H PRN **OR** acetaminophen (TYLENOL) suppository 650 mg, 650 mg, Rectal, Q6H PRN  enoxaparin (LOVENOX) injection 30 mg, 30 mg, Subcutaneous, Daily  glucose (GLUTOSE) 40 % oral gel 15 g, 15 g, Oral, PRN  dextrose 50 % IV solution, 12.5 g, Intravenous, PRN  glucagon (rDNA) injection 1 mg, 1 mg, Intramuscular, PRN  dextrose 5 % solution, 100 mL/hr, Intravenous, PRN  Allergies:  Penicillins    Social History:    TOBACCO:   reports that she has quit smoking. She has never used smokeless tobacco.  ETOH:   reports previous alcohol use. DRUGS:   reports no history of drug use. Family History:   History reviewed. No pertinent family history. REVIEW OF SYSTEMS:    Constitutional: Negative for chills and fever.    HENT: Negative for congestion. Respiratory: Positive for shortness of breath. Negative for cough. Cardiovascular: Negative for chest pain. Gastrointestinal: Negative for abdominal pain and vomiting. Genitourinary: Negative for dysuria. Musculoskeletal: Negative for back pain. Skin: Negative for rash. PHYSICAL EXAM:      Vitals:    VITALS:  BP (!) 155/53   Pulse 64   Temp 96.5 °F (35.8 °C) (Temporal)   Resp 12   Ht 5' 4\" (1.626 m)   Wt 176 lb 5.9 oz (80 kg)   SpO2 94%   BMI 30.27 kg/m²   24HR INTAKE/OUTPUT:      Intake/Output Summary (Last 24 hours) at 7/24/2021 1003  Last data filed at 7/24/2021 8874  Gross per 24 hour   Intake --   Output 1450 ml   Net -1450 ml       Constitutional:  Alert and oriented. HEENT:  Normocephalic and atraumatic. Respiratory:  Diminished. On 8L HFNC  Cardiovascular/Edema:  Normal rate and regular rhythm. Gastrointestinal:  Soft, nontender, + Bowel sounds  Neurologic:  She is alert and oriented to person, place, and time. Skin:  Skin is warm and dry.  Bilateral lower extremity anterior tibial wounds      DATA:    CBC:   Lab Results   Component Value Date    WBC 7.3 07/24/2021    RBC 3.07 07/24/2021    HGB 8.7 07/24/2021    HCT 29.0 07/24/2021    MCV 94.5 07/24/2021    MCH 28.3 07/24/2021    MCHC 30.0 07/24/2021    RDW 17.6 07/24/2021     07/24/2021    MPV 10.3 07/24/2021     CMP:    Lab Results   Component Value Date     07/24/2021    K 5.1 07/24/2021     07/24/2021    CO2 27 07/24/2021    BUN 84 07/24/2021    CREATININE 3.1 07/24/2021    GFRAA 17 07/24/2021    LABGLOM 14 07/24/2021    LABGLOM 33 03/28/2018    GLUCOSE 310 07/24/2021    GLUCOSE 263 03/28/2018    PROT 5.6 07/24/2021    LABALBU 2.8 07/24/2021    LABALBU 3.0 03/28/2018    CALCIUM 8.2 07/24/2021    BILITOT <0.2 07/24/2021    BILITOT NEGATIVE 10/03/2017    ALKPHOS 172 07/24/2021    AST 18 07/24/2021    ALT 24 07/24/2021     Magnesium:    Lab Results   Component Value Date    MG 1.6 06/13/2021 Phosphorus:    Lab Results   Component Value Date    PHOS 3.3 10/04/2017     Radiology Review:              Chest Xray 7/23/21   Opacities in perihilar locations and lung bases related to bilateral   pneumonia or atelectasis and likely bilateral pleural effusions. CT Head 7/23/21   1.  Slightly limited exam, grossly negative for acute intracranial process,   within the limits of this non-contrast CT exam.       2.  Sequela of atherosclerotic arterial and chronic microvascular ischemic   disease, as described.       3.  Age-appropriate, generalized parenchymal volume loss.       4.  Paranasal sinus, left mastoid air cell complex, and bilateral external   auditory canal disease, as described.  The right mastoid air cell complex is   moderately/severely hypoplastic.       5.  Chronic osseous findings, as described       CT chest 7/23/21   1. Moderate pleural effusions, right larger than left.  Left pleural effusion   is partially loculated. 2. Right lower lobe consolidation and volume loss, likely atelectasis   although superimposed pneumonia/infiltrate not excluded. 3. Mild interstitial thickening probably represents mild interstitial edema. IMPRESSION/RECOMMENDATIONS:    Briefly, Mrs. Shannan Harris is a 80year old female with a past medical history of CKD Stage III baseline creatinine 2.5-2.7mg/dL, Anemia, Diabetes Mellitus type 2, Alzheimer's, Hypertension, CHF, who presented to the ER on 7/23/21 with complaints of shortness of breath. Upon arrival to the ER she was found to be hypoxic and was placed on nonrebreather and treated with duo nebs and steroids. Patient's daughter states she was diagnosed 2  Weeks ago with Pneumonia and was admitted to the hospital. She was discharged to a skilled nursing facility where she was noncompliant with her medications and inhalers. She was also found to be hyperkalemic and hyperkalemic protocol was iniated.  Pertinent labs include: potassium: 5.9 mmol/L, BUN 81, Creatinine 3.1 mg/dL, Anion gap 18, CO2 21, Pro- BNP 7,692. Chest Xray was consistent with CHF exacerbation. Renal was consulted for Hyperkalemia and ANGELA on CKD. 1. ANGELA Stage I on CKD, likely secondary cardiorenal syndrome, non complaint with medications at skilled nursing facility. No improvement in creatinine in last 24 hours. Non-oliguric. 2. CKD stage IV,  baseline creatinine 2.5-2.7mg/dL, secondary to hypertensive nephrosclerosis and diabetic nephropathy. 3. Hyperkalemia, S/P hyperkalemic protocol, on Lokelma  4. CHF exacerbation, echo pending. BNP 7,692, on IV lasix BID  5. Acute respiratory failure, secondary to # 4. Requiring high levels of supplemental oxygen, 8 L HFNC  6. HTN, on Metoprolol  7. Anemia: to obtain iron studies    Plan:    · Obtain BMP at 4 pm.   · Check renal US  · Obtain Ionized Calcium  · Obtain Iron Studies  · Obtain Echo- pending,   · Obtain Urine Studies  · Continue Lasix IV 40mg PO BID  · Continue Lokelma for now  · Strict Intake & Output  · Continue to monitor Potassium levels   · Continue to monitor renal function. Thank you very much Dr. Jill Moe MD for allowing us to participate in the care of Mrs. Kacy De Anda.    Patient seen and examined and agree with above as annotated    Giles Chahal MD

## 2021-07-24 NOTE — PROGRESS NOTES
Patient is on a medication that requires a renal dose adjustment. Renal Function Assessment:    Date Body Weight IBW Adj. Body Weight SCr CrCl Dialysis status   7/23/2021 79.8 kg Unknown - no height Unknown - no height 3.4 <30 ml/min N/A       Pharmacy has renally dose-adjusted the following medication(s):    Date Medication Original Dosing Regimen New Dosing Regimen   7/23/2021 Enoxaparin 40 mg sq daily 30 mg sq daily           These changes were made per protocol according to the [facility] Renal Function-Based Dose Adjustments Policy    *Please note this dose may need readjusted if your patient's renal function significantly improves. Please contact pharmacy with any questions regarding these changes.

## 2021-07-24 NOTE — PLAN OF CARE
Problem: Injury - Risk of, Physical Injury:  Goal: Absence of physical injury  Description: Absence of physical injury  Outcome: Met This Shift     Problem: Falls - Risk of:  Goal: Absence of physical injury  Description: Absence of physical injury  Outcome: Met This Shift     Problem: Cardiac:  Goal: Hemodynamic stability will improve  Description: Hemodynamic stability will improve  Outcome: Met This Shift     Problem: Gas Exchange - Impaired:  Goal: Levels of oxygenation will improve  Description: Levels of oxygenation will improve  Outcome: Ongoing

## 2021-07-24 NOTE — CONSULTS
Frank Agosto M.D.,[facility]  Mona Baumgarten, D.O., F.A.C.O.I., Santiago Toledo M.D. Linette Rea M.D., Damion Crawford M.D. Bijal Dykes D.O. Patient:  Eusebio Crenshaw 80 y.o. female MRN: [id number]     Date of Service: 7/24/2021      PULMONARY CONSULTATION    Reason for Consultation: dyspnea, pleural effusions  Referring Physician: Nayely Gagnon MD    Chief Complaint   Patient presents with    Shortness of Breath     +pneumonia, denies cp         Code Status: Limited        SUBJECTIVE:    HPI:  This is a 35-year-old female with multiple comorbidities including CKD stage III, DM, dementia-Alzheimer's, HTN, CHF that presented with worsening dyspnea. Patient poor historian due to dementia. Patient admitted to ER where she was found to hypoxic and placed on nonrebreather. She was admitted a couple weeks ago for pneumonia and when she was discharged to a skilled nursing facility. She was found to be combative at facility and noncompliant with medical regimen. Patient found to be in acute kidney injury and CHF exacerbation. Patient had CT chest revealing bilateral pleural effusions right greater than left. Currently patient is on 8 L high flow nasal cannula. She is being diuresed with Lasix and being followed by nephrology. Spoke with the daughter Powell Gaucher. Reports she has been in multiple facilities and has been agitated and combative. She is considering home with hospice and discussions with palliative care, unsure if she is able to at this time.       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 No pertinent surgical history. History reviewed. No pertinent family history. Social History:   Social History     Socioeconomic History    Marital status: Unknown     Spouse name: Not on file    Number of children: Not on file    Years of education: Not on file    Highest education level: Not on file   Occupational History    Not on file   Tobacco Use    Smoking status: Former Smoker    Smokeless tobacco: Never Used   Substance and Sexual Activity    Alcohol use: Not Currently    Drug use: Never    Sexual activity: Not on file   Other Topics Concern    Not on file   Social History Narrative    Not on file     Social Determinants of Health     Financial Resource Strain:     Difficulty of Paying Living Expenses:    Food Insecurity:     Worried About [employer] in the Last Year:     [employer] in the Last Year:    Transportation Needs:     Lack of Transportation (Medical):  Lack of Transportation (Non-Medical):    Physical Activity:     Days of Exercise per Week:     Minutes of Exercise per Session:    Stress:     Feeling of Stress :    Social Connections:     Frequency of Communication with Friends and Family:     Frequency of Social Gatherings with Friends and Family:     Attends Voodoo Services:     Active Member of Clubs or Organizations:     Attends Club or Organization Meetings:     Marital Status:    Intimate Partner Violence:     Fear of Current or Ex-Partner:     Emotionally Abused:     Physically Abused:     Sexually Abused:          Vaccines: There is no immunization history on file for this patient.      Home Meds: Medications Prior to Admission: acetaminophen (TYLENOL) 325 MG tablet, Take 650 mg by mouth every 6 hours as needed for Pain  aspirin 81 MG chewable tablet, Take 81 mg by mouth daily  bumetanide (BUMEX) 0.5 MG tablet, Take 0.5 mg by mouth daily  exenatide (BYETTA 5 MCG PEN) 5 MCG/0.02ML injection, Inject 5 mcg into the skin every morning (before breakfast)  citalopram (CELEXA) 20 MG tablet, Take 20 mg by mouth daily  doxycycline hyclate (VIBRAMYCIN) 100 MG capsule, Take 100 mg by mouth 2 times daily Started on 7/22/21  bisacodyl (DULCOLAX) 10 MG suppository, Place 10 mg rectally daily as needed for Constipation  ipratropium-albuterol (DUONEB) 0.5-2.5 (3) MG/3ML SOLN nebulizer solution, Inhale 1 vial into the lungs every 2 hours as needed for Shortness of Breath  ferrous sulfate (IRON 325) 325 (65 Fe) MG tablet, Take 325 mg by mouth daily (with breakfast)  glucagon 1 MG injection, Inject 1 kit into the muscle as needed (hypoglycemia)  insulin lispro (HUMALOG) 100 UNIT/ML injection vial, Inject 5 Units into the skin 3 times daily (before meals)  insulin lispro (HUMALOG) 100 UNIT/ML injection vial, Inject 0-9 Units into the skin 2 times daily (before meals) Per sliding scale  0-200 = 0 units 201-250 = 3 units 251-300 = 4 units 301-350 = 6 units 351-400 = 9 units  levETIRAcetam (KEPPRA) 250 MG tablet, Take 250 mg by mouth every morning (before breakfast)  insulin detemir (LEVEMIR FLEXTOUCH) 100 UNIT/ML injection pen, Inject 10 Units into the skin nightly  insulin detemir (LEVEMIR FLEXTOUCH) 100 UNIT/ML injection pen, Inject 45 Units into the skin every morning  linagliptin (TRADJENTA) 5 MG tablet, Take 5 mg by mouth daily  atorvastatin (LIPITOR) 20 MG tablet, Take 20 mg by mouth daily  metOLazone (ZAROXOLYN) 2.5 MG tablet, Take 2.5 mg by mouth daily Give 30 minutes prior to Bumex  metoprolol tartrate (LOPRESSOR) 50 MG tablet, Take 50 mg by mouth 2 times daily  magnesium hydroxide (MILK OF MAGNESIA) 400 MG/5ML suspension, Take 30 mLs by mouth daily as needed for Constipation  mirtazapine (REMERON) 15 MG tablet, Take 15 mg by mouth nightly  Multiple Vitamins-Minerals (THERAPEUTIC MULTIVITAMIN-MINERALS) tablet, Take 1 tablet by mouth daily  amLODIPine (NORVASC) 5 MG tablet, Take 5 mg by mouth daily  dextromethorphan-quiNIDine (NUEDEXTA) 20-10 MG CAPS per capsule, Take 1 capsule by mouth 2 times daily  senna (SENOKOT) 8.6 MG tablet, Take 1 tablet by mouth daily as needed for Constipation  ascorbic acid (VITAMIN C) 500 MG tablet, Take 500 mg by mouth 2 times daily  ALPRAZolam (XANAX) 0.25 MG tablet, Take 0.25 mg by mouth 2 times daily as needed for Anxiety. Sodium Phosphates (FLEET) 7-19 GM/118ML, Place 1 enema rectally daily as needed  vitamin D (CHOLECALCIFEROL) 37552 UNIT CAPS, Take 50,000 Units by mouth once a week Given Fridays    CURRENT MEDS :  Scheduled Meds:   sodium zirconium cyclosilicate  5 g Oral BID    insulin lispro  0-12 Units Subcutaneous TID WC    insulin lispro  0-6 Units Subcutaneous Nightly    vancomycin  20 mg/kg Intravenous Once    furosemide  40 mg Intravenous 2 times per day    aspirin  81 mg Oral Daily    levETIRAcetam  250 mg Oral QAM AC    metoprolol tartrate  25 mg Oral BID    mirtazapine  15 mg Oral Nightly    sodium chloride flush  5-40 mL Intravenous 2 times per day    enoxaparin  30 mg Subcutaneous Daily       Continuous Infusions:   sodium chloride      dextrose         Allergies   Allergen Reactions    Penicillins        REVIEW OF SYSTEMS:  REVIEW OF SYMPTOMS:  Review of Systems   Unable to perform ROS: Dementia          OBJECTIVE:   /60   Pulse 58   Temp 96.5 °F (35.8 °C) (Tympanic)   Resp 14   Ht 5' 4\" (1.626 m)   Wt 176 lb 5.9 oz (80 kg)   SpO2 92%   BMI 30.27 kg/m²   SpO2 Readings from Last 1 Encounters:   07/24/21 92%        I/O:    Intake/Output Summary (Last 24 hours) at 7/24/2021 1222  Last data filed at 7/24/2021 0619  Gross per 24 hour   Intake --   Output 1450 ml   Net -1450 ml     Vent Information  SpO2: 92 %                PHYSICAL EXAM:  Physical Exam  HENT:      Head: Normocephalic and atraumatic.    Eyes: Conjunctiva/sclera: Conjunctivae normal.   Neck:      Trachea: No tracheal deviation. Cardiovascular:      Rate and Rhythm: Normal rate and regular rhythm. Heart sounds: Normal heart sounds. Pulmonary:      Effort: Pulmonary effort is normal.      Breath sounds: Decreased breath sounds present. Abdominal:      General: Bowel sounds are normal.      Palpations: Abdomen is soft. Tenderness: There is no abdominal tenderness. Musculoskeletal:      Cervical back: Neck supple. Lymphadenopathy:      Cervical: No cervical adenopathy. Skin:     General: Skin is warm and dry. Findings: No rash. Comments: +wound RLE   Neurological:      General: No focal deficit present. Mental Status: She is alert. She is disoriented. Pulmonary Function Testing personally reviewed and interpreted. PERTINENT LAB RESULTS: Labs reviewed. DIAGNOSTICS: Pertinent imaging reviewed. 7/23 CT Chest:   1. Moderate pleural effusions, right larger than left.  Left pleural effusion   is partially loculated. 2. Right lower lobe consolidation and volume loss, likely atelectasis   although superimposed pneumonia/infiltrate not excluded. 3. Mild interstitial thickening probably represents mild interstitial edema. ASSESSMENT:     1. Acute hypoxic respiratory failure secondary to pleural effusions/atelectasis  2. Bilateral pleural effusions right greater than left, appearance of partially loculated component to left pleural effusion  3. Acute decompensated CHF  4. ANGELA on CKD  5. Dementia  6. Medical noncompliance      PLAN:      Continue supplemental O2 to maintain SPO2 greater than 92%   2D echo ordered   Patient being diuresed per nephrology   Discussed thoracentesis with daughter, Paddy Elizalde, who is POA. She is okay with proceeding with thoracentesis if needed. We will plan for IR to do thoracentesis if no improvement.  Palliative care following, limited code. Poor prognosis.    GI/DVT -Lovenox 30 mg        Thank you for allowing me to participate in the care of Page Bear. Please feel free to call with questions.        Electronically signed by Elvis Quiros DO on 7/24/2021 at 12:22 PM

## 2021-07-24 NOTE — PROGRESS NOTES
Cardio CX placed via perfect serve to Dr Lola Canales. Renal CX placed thru answering serve to Dr Arden Rehman. Riri Lopes RN

## 2021-07-25 ENCOUNTER — APPOINTMENT (OUTPATIENT)
Dept: GENERAL RADIOLOGY | Age: 86
DRG: 291 | End: 2021-07-25
Payer: COMMERCIAL

## 2021-07-25 PROBLEM — I48.0 PAROXYSMAL ATRIAL FIBRILLATION (HCC): Status: ACTIVE | Noted: 2021-07-25

## 2021-07-25 LAB
ALBUMIN SERPL-MCNC: 3.1 G/DL (ref 3.5–5.2)
ALP BLD-CCNC: 167 U/L (ref 35–104)
ALT SERPL-CCNC: 21 U/L (ref 0–32)
ANION GAP SERPL CALCULATED.3IONS-SCNC: 12 MMOL/L (ref 7–16)
AST SERPL-CCNC: 19 U/L (ref 0–31)
BACTERIA: NORMAL /HPF
BASOPHILS ABSOLUTE: 0.03 E9/L (ref 0–0.2)
BASOPHILS RELATIVE PERCENT: 0.2 % (ref 0–2)
BILIRUB SERPL-MCNC: 0.3 MG/DL (ref 0–1.2)
BILIRUBIN URINE: NEGATIVE
BLOOD, URINE: NORMAL
BUN BLDV-MCNC: 81 MG/DL (ref 6–23)
CALCIUM SERPL-MCNC: 8.5 MG/DL (ref 8.6–10.2)
CHLORIDE BLD-SCNC: 100 MMOL/L (ref 98–107)
CLARITY: CLEAR
CO2: 34 MMOL/L (ref 22–29)
COLOR: YELLOW
CREAT SERPL-MCNC: 2.8 MG/DL (ref 0.5–1)
EKG ATRIAL RATE: 104 BPM
EKG ATRIAL RATE: 113 BPM
EKG Q-T INTERVAL: 350 MS
EKG Q-T INTERVAL: 416 MS
EKG QRS DURATION: 142 MS
EKG QRS DURATION: 144 MS
EKG QTC CALCULATION (BAZETT): 467 MS
EKG QTC CALCULATION (BAZETT): 567 MS
EKG R AXIS: -21 DEGREES
EKG R AXIS: -36 DEGREES
EKG T AXIS: 115 DEGREES
EKG T AXIS: 122 DEGREES
EKG VENTRICULAR RATE: 107 BPM
EKG VENTRICULAR RATE: 112 BPM
EOSINOPHILS ABSOLUTE: 0.06 E9/L (ref 0.05–0.5)
EOSINOPHILS RELATIVE PERCENT: 0.4 % (ref 0–6)
GFR AFRICAN AMERICAN: 19
GFR NON-AFRICAN AMERICAN: 16 ML/MIN/1.73
GLUCOSE BLD-MCNC: 146 MG/DL (ref 74–99)
GLUCOSE URINE: NEGATIVE MG/DL
HCT VFR BLD CALC: 31.1 % (ref 34–48)
HEMOGLOBIN: 9.4 G/DL (ref 11.5–15.5)
IMMATURE GRANULOCYTES #: 0.12 E9/L
IMMATURE GRANULOCYTES %: 0.7 % (ref 0–5)
KETONES, URINE: NEGATIVE MG/DL
LEUKOCYTE ESTERASE, URINE: NEGATIVE
LYMPHOCYTES ABSOLUTE: 1.45 E9/L (ref 1.5–4)
LYMPHOCYTES RELATIVE PERCENT: 8.9 % (ref 20–42)
MCH RBC QN AUTO: 28.8 PG (ref 26–35)
MCHC RBC AUTO-ENTMCNC: 30.2 % (ref 32–34.5)
MCV RBC AUTO: 95.4 FL (ref 80–99.9)
METER GLUCOSE: 138 MG/DL (ref 74–99)
METER GLUCOSE: 165 MG/DL (ref 74–99)
METER GLUCOSE: 272 MG/DL (ref 74–99)
METER GLUCOSE: 291 MG/DL (ref 74–99)
MONOCYTES ABSOLUTE: 1.47 E9/L (ref 0.1–0.95)
MONOCYTES RELATIVE PERCENT: 9.1 % (ref 2–12)
NEUTROPHILS ABSOLUTE: 13.08 E9/L (ref 1.8–7.3)
NEUTROPHILS RELATIVE PERCENT: 80.7 % (ref 43–80)
NITRITE, URINE: NEGATIVE
PDW BLD-RTO: 17.9 FL (ref 11.5–15)
PH UA: 5 (ref 5–9)
PLATELET # BLD: 326 E9/L (ref 130–450)
PMV BLD AUTO: 10.3 FL (ref 7–12)
POTASSIUM SERPL-SCNC: 3.9 MMOL/L (ref 3.5–5)
PROCALCITONIN: 0.12 NG/ML (ref 0–0.08)
PROTEIN UA: NEGATIVE MG/DL
RBC # BLD: 3.26 E12/L (ref 3.5–5.5)
RBC UA: NORMAL /HPF (ref 0–2)
SODIUM BLD-SCNC: 146 MMOL/L (ref 132–146)
SPECIFIC GRAVITY UA: 1.01 (ref 1–1.03)
TOTAL PROTEIN: 6.1 G/DL (ref 6.4–8.3)
TROPONIN, HIGH SENSITIVITY: 61 NG/L (ref 0–9)
URINE CULTURE, ROUTINE: NORMAL
UROBILINOGEN, URINE: 0.2 E.U./DL
WBC # BLD: 16.2 E9/L (ref 4.5–11.5)
WBC UA: NORMAL /HPF (ref 0–5)

## 2021-07-25 PROCEDURE — 2580000003 HC RX 258: Performed by: INTERNAL MEDICINE

## 2021-07-25 PROCEDURE — 6370000000 HC RX 637 (ALT 250 FOR IP): Performed by: INTERNAL MEDICINE

## 2021-07-25 PROCEDURE — 84484 ASSAY OF TROPONIN QUANT: CPT

## 2021-07-25 PROCEDURE — 6360000002 HC RX W HCPCS: Performed by: INTERNAL MEDICINE

## 2021-07-25 PROCEDURE — 87040 BLOOD CULTURE FOR BACTERIA: CPT

## 2021-07-25 PROCEDURE — 82962 GLUCOSE BLOOD TEST: CPT

## 2021-07-25 PROCEDURE — 81001 URINALYSIS AUTO W/SCOPE: CPT

## 2021-07-25 PROCEDURE — 6360000002 HC RX W HCPCS: Performed by: NURSE PRACTITIONER

## 2021-07-25 PROCEDURE — 99223 1ST HOSP IP/OBS HIGH 75: CPT | Performed by: INTERNAL MEDICINE

## 2021-07-25 PROCEDURE — 93005 ELECTROCARDIOGRAM TRACING: CPT | Performed by: PHYSICIAN ASSISTANT

## 2021-07-25 PROCEDURE — APPSS45 APP SPLIT SHARED TIME 31-45 MINUTES: Performed by: NURSE PRACTITIONER

## 2021-07-25 PROCEDURE — 71045 X-RAY EXAM CHEST 1 VIEW: CPT

## 2021-07-25 PROCEDURE — 93005 ELECTROCARDIOGRAM TRACING: CPT | Performed by: INTERNAL MEDICINE

## 2021-07-25 PROCEDURE — 85025 COMPLETE CBC W/AUTO DIFF WBC: CPT

## 2021-07-25 PROCEDURE — 84145 PROCALCITONIN (PCT): CPT

## 2021-07-25 PROCEDURE — 36415 COLL VENOUS BLD VENIPUNCTURE: CPT

## 2021-07-25 PROCEDURE — 2580000003 HC RX 258: Performed by: NURSE PRACTITIONER

## 2021-07-25 PROCEDURE — 80053 COMPREHEN METABOLIC PANEL: CPT

## 2021-07-25 PROCEDURE — 2700000000 HC OXYGEN THERAPY PER DAY

## 2021-07-25 PROCEDURE — 2140000000 HC CCU INTERMEDIATE R&B

## 2021-07-25 RX ORDER — FUROSEMIDE 10 MG/ML
20 INJECTION INTRAMUSCULAR; INTRAVENOUS
Status: DISCONTINUED | OUTPATIENT
Start: 2021-07-26 | End: 2021-07-28

## 2021-07-25 RX ORDER — LORAZEPAM 2 MG/ML
0.5 INJECTION INTRAMUSCULAR EVERY 6 HOURS PRN
Status: DISCONTINUED | OUTPATIENT
Start: 2021-07-25 | End: 2021-08-04 | Stop reason: HOSPADM

## 2021-07-25 RX ADMIN — VANCOMYCIN HYDROCHLORIDE 1500 MG: 10 INJECTION, POWDER, LYOPHILIZED, FOR SOLUTION INTRAVENOUS at 15:33

## 2021-07-25 RX ADMIN — INSULIN LISPRO 9 UNITS: 100 INJECTION, SOLUTION INTRAVENOUS; SUBCUTANEOUS at 17:22

## 2021-07-25 RX ADMIN — METOPROLOL TARTRATE 25 MG: 25 TABLET, FILM COATED ORAL at 08:40

## 2021-07-25 RX ADMIN — Medication 10 ML: at 20:47

## 2021-07-25 RX ADMIN — CEFEPIME HYDROCHLORIDE 1000 MG: 2 INJECTION, POWDER, FOR SOLUTION INTRAVENOUS at 18:05

## 2021-07-25 RX ADMIN — SODIUM CHLORIDE 100 MG: 9 INJECTION, SOLUTION INTRAVENOUS at 13:01

## 2021-07-25 RX ADMIN — INSULIN GLARGINE 20 UNITS: 100 INJECTION, SOLUTION SUBCUTANEOUS at 20:46

## 2021-07-25 RX ADMIN — Medication 10 ML: at 11:12

## 2021-07-25 RX ADMIN — LEVETIRACETAM 250 MG: 500 TABLET ORAL at 06:41

## 2021-07-25 RX ADMIN — SODIUM ZIRCONIUM CYCLOSILICATE 5 G: 5 POWDER, FOR SUSPENSION ORAL at 08:40

## 2021-07-25 RX ADMIN — INSULIN LISPRO 5 UNITS: 100 INJECTION, SOLUTION INTRAVENOUS; SUBCUTANEOUS at 20:45

## 2021-07-25 RX ADMIN — SODIUM CHLORIDE 25 MG: 9 INJECTION, SOLUTION INTRAVENOUS at 11:23

## 2021-07-25 RX ADMIN — FUROSEMIDE 40 MG: 10 INJECTION, SOLUTION INTRAMUSCULAR; INTRAVENOUS at 15:15

## 2021-07-25 RX ADMIN — ASPIRIN 81 MG: 81 TABLET, CHEWABLE ORAL at 08:40

## 2021-07-25 RX ADMIN — SODIUM CHLORIDE: 9 INJECTION, SOLUTION INTRAVENOUS at 22:22

## 2021-07-25 RX ADMIN — LORAZEPAM 0.5 MG: 2 INJECTION INTRAMUSCULAR; INTRAVENOUS at 11:00

## 2021-07-25 RX ADMIN — MIRTAZAPINE 15 MG: 15 TABLET, FILM COATED ORAL at 20:47

## 2021-07-25 RX ADMIN — ENOXAPARIN SODIUM 30 MG: 30 INJECTION SUBCUTANEOUS at 20:47

## 2021-07-25 RX ADMIN — INSULIN LISPRO 3 UNITS: 100 INJECTION, SOLUTION INTRAVENOUS; SUBCUTANEOUS at 08:42

## 2021-07-25 RX ADMIN — FUROSEMIDE 40 MG: 10 INJECTION, SOLUTION INTRAMUSCULAR; INTRAVENOUS at 06:41

## 2021-07-25 ASSESSMENT — PAIN SCALES - PAIN ASSESSMENT IN ADVANCED DEMENTIA (PAINAD)
BODYLANGUAGE: 0
CONSOLABILITY: 0
FACIALEXPRESSION: 0
BREATHING: 0
TOTALSCORE: 0
NEGVOCALIZATION: 0

## 2021-07-25 ASSESSMENT — PAIN SCALES - GENERAL
PAINLEVEL_OUTOF10: 0

## 2021-07-25 ASSESSMENT — PAIN - FUNCTIONAL ASSESSMENT: PAIN_FUNCTIONAL_ASSESSMENT: PREVENTS OR INTERFERES SOME ACTIVE ACTIVITIES AND ADLS

## 2021-07-25 NOTE — PROGRESS NOTES
Chief Complaint:  Chief Complaint   Patient presents with    Shortness of Breath     +pneumonia, denies cp     Acute congestive heart failure ([address]     Subjective:    She was more awake for me today, denies any complaints, obviously very, very demented. She has gone into AF, rate controlled. She denies any palpitations or chest pain. Objective:    BP (!) 87/50   Pulse 91   Temp 99.2 °F (37.3 °C) (Temporal)   Resp 18   Ht 5' 4\" (1.626 m)   Wt 164 lb 14.5 oz (74.8 kg)   SpO2 96%   BMI 28.31 kg/m²     Current medications that patient is taking have been reviewed.     General appearance: NAD, conversant  HEENT: AT/NC, MMM  Neck: FROM, supple  Lungs: B/l rales, WOB normal  CV: RRR, no MRGs  Abdomen: Soft, non-tender; no masses or HSM, +BS  Extremities: No peripheral edema or digital cyanosis  Skin: no rash, lesions or ulcers  Psych: Calm and cooperative  Neuro: Alert and interactive, face symmetric, moving all extremities, speech fluent, quite demented    Labs:  CBC with Differential:    Lab Results   Component Value Date    WBC 16.2 07/25/2021    RBC 3.26 07/25/2021    HGB 9.4 07/25/2021    HCT 31.1 07/25/2021     07/25/2021    MCV 95.4 07/25/2021    MCH 28.8 07/25/2021    MCHC 30.2 07/25/2021    RDW 17.9 07/25/2021    NRBC 0.9 07/24/2021    SEGSPCT 68.0 06/08/2021    LYMPHOPCT 8.9 07/25/2021    LYMPHOPCT 28.8 03/28/2018    MONOPCT 9.1 07/25/2021    BASOPCT 0.2 07/25/2021    MONOSABS 1.47 07/25/2021    LYMPHSABS 1.45 07/25/2021    EOSABS 0.06 07/25/2021    BASOSABS 0.03 07/25/2021     CMP:    Lab Results   Component Value Date     07/25/2021    K 3.9 07/25/2021    K 5.1 07/24/2021     07/25/2021    CO2 34 07/25/2021    BUN 81 07/25/2021    CREATININE 2.8 07/25/2021    GFRAA 19 07/25/2021    LABGLOM 16 07/25/2021    LABGLOM 33 03/28/2018    GLUCOSE 146 07/25/2021    GLUCOSE 263 03/28/2018    PROT 6.1 07/25/2021    LABALBU 3.1 07/25/2021    LABALBU 3.0 03/28/2018    CALCIUM 8.5 07/25/2021 BILITOT 0.3 07/25/2021    BILITOT NEGATIVE 10/03/2017    ALKPHOS 167 07/25/2021    AST 19 07/25/2021    ALT 21 07/25/2021          Assessment/Plan:  Principal Problem:    Acute congestive heart failure (HCC)  Active Problems:    Stage 4 chronic kidney disease (HCC)    Type 2 diabetes mellitus without complication, with long-term current use of insulin (HCC)    Hypertension    Vascular dementia without behavioral disturbance (HCC)    Prolonged Q-T interval on ECG    Acute hypoxemic respiratory failure (HCC)    Acute kidney injury superimposed on CKD ([facility]  Resolved Problems:    * No resolved hospital problems. *       Prognosis poor    Continue diuretics    TTE still pending (despite ordered stat)    New development PAF, rate controlled. EKG's personally reviewed: AF LBBB HR ~110. She's having some significant pauses up to 3 sec when I saw her, and apparently up to 6 sec later in the morning    Mental status poor. Combative and refusing oxygen at times. Respiratory status and prolonged QTc makes any type of sedating treatment problematic. Can try tiny doses of lorazepam if necessary. Avoid QT prolongers. Latest EKG shows QTc 567. Glucose improving a lot    R thora planned    ANGELA improving    TMax 99.2 and WBC up to 16k, possible she may have superimposed infection. Redo infectious workup: CXR, UA, blood cultures. Start vanc/cefepime.     Palliative care on board    Requires continued inpatient level of care       Bianca Ferreira MD    2:41 PM  7/25/2021

## 2021-07-25 NOTE — PROGRESS NOTES
Pharmacy Consultation Note  (Antibiotic Dosing and Monitoring)    Initial consult date: 21  Consulting physician: Dr. Bebo Boothe  Drug: Vancomycin  Indication: Pneumonia (HAP)    Age/  Gender Height Weight IBW  Allergy Information   89 y.o./female 5' 4\" (162.6 cm) 176 lb (79.8 kg)     Ideal body weight: 54.7 kg (120 lb 9.5 oz)  Adjusted ideal body weight: 62.7 kg (138 lb 5.1 oz)   Penicillins      Temp (24hrs), Av.9 °F (36.6 °C), Min:96.5 °F (35.8 °C), Max:99.2 °F (37.3 °C)          Date  WBC BUN SCr CrCl  (mL/min) Drug/Dose Time   Given Level(s)   (Time) Comments   21 -- -- -- -- Vancomycin 1500 mg IV  -  LD marked as not given   21 16.2 81 2.8 12 Vancomycin 1500 mg IV  <1530>                                 Intake/Output Summary (Last 24 hours) at 2021 1507  Last data filed at 2021 1416  Gross per 24 hour   Intake 360 ml   Output 2550 ml   Net -2190 ml       Historical Cultures:  No results found for: [facility]  Recent Labs     21  1204   BC 24 Hours no growth       Cultures:  available culture and sensitivity results were reviewed in [facility]    Assessment:  · 80 y.o. female admitted for SOB, pneumonia   · Empirically started on vancomycin and cefepime  · ANGELA on CKD  · Vancomycin load not given in ER  per MAR  · Estimated CrCl = 12 mL/min  · Goal trough level = 15-20 mcg/mL    Plan:  · Will order vancomycin 1500 mg IV x 1  · Monitor renal function   · Clinical pharmacy to follow based on levels       [address], [facility] 2021 3:07 PM

## 2021-07-25 NOTE — PROGRESS NOTES
Department of Internal Medicine  Nephrology Progress Note    Events Reviewed. Subjective: We are following Ms. Lito Waterman for Hyperkalemia and ANGELA on CKD. Appears to have new onset Atrial Fibrillation throught out the night.       Current Medications:    Current Facility-Administered Medications: sodium zirconium cyclosilicate (LOKELMA) oral suspension 5 g, 5 g, Oral, BID  insulin lispro (HUMALOG) injection vial 0-18 Units, 0-18 Units, Subcutaneous, TID WC  insulin lispro (HUMALOG) injection vial 0-9 Units, 0-9 Units, Subcutaneous, Nightly  insulin glargine (LANTUS) injection vial 20 Units, 20 Units, Subcutaneous, Nightly  vancomycin 1500 mg in dextrose 5% 300 mL IVPB, 20 mg/kg, Intravenous, Once  perflutren lipid microspheres (DEFINITY) injection 1.65 mg, 1.5 mL, Intravenous, ONCE PRN  furosemide (LASIX) injection 40 mg, 40 mg, Intravenous, 2 times per day  aspirin chewable tablet 81 mg, 81 mg, Oral, Daily  ipratropium-albuterol (DUONEB) nebulizer solution 3 mL, 1 vial, Inhalation, Q2H PRN  levETIRAcetam (KEPPRA) tablet 250 mg, 250 mg, Oral, QAM AC  metoprolol tartrate (LOPRESSOR) tablet 25 mg, 25 mg, Oral, BID  mirtazapine (REMERON) tablet 15 mg, 15 mg, Oral, Nightly  sodium chloride flush 0.9 % injection 5-40 mL, 5-40 mL, Intravenous, 2 times per day  sodium chloride flush 0.9 % injection 5-40 mL, 5-40 mL, Intravenous, PRN  0.9 % sodium chloride infusion, 25 mL, Intravenous, PRN  polyethylene glycol (GLYCOLAX) packet 17 g, 17 g, Oral, Daily PRN  acetaminophen (TYLENOL) tablet 650 mg, 650 mg, Oral, Q6H PRN **OR** acetaminophen (TYLENOL) suppository 650 mg, 650 mg, Rectal, Q6H PRN  enoxaparin (LOVENOX) injection 30 mg, 30 mg, Subcutaneous, Daily  glucose (GLUTOSE) 40 % oral gel 15 g, 15 g, Oral, PRN  dextrose 50 % IV solution, 12.5 g, Intravenous, PRN  glucagon (rDNA) injection 1 mg, 1 mg, Intramuscular, PRN  dextrose 5 % solution, 100 mL/hr, Intravenous, PRN  Allergies:  Penicillins    PHYSICAL EXAM: Vitals:    VITALS:  BP (!) 102/52   Pulse 97   Temp 98.1 °F (36.7 °C) (Oral)   Resp 18   Ht 5' 4\" (1.626 m)   Wt 164 lb 14.5 oz (74.8 kg)   SpO2 96%   BMI 28.31 kg/m²   24HR INTAKE/OUTPUT:      Intake/Output Summary (Last 24 hours) at 7/25/2021 1002  Last data filed at 7/25/2021 0600  Gross per 24 hour   Intake 120 ml   Output 2100 ml   Net -1980 ml       Constitutional:  Alert and oriented. HEENT:  Normocephalic and atraumatic. Respiratory:  Diminished. On 8L HFNC  Cardiovascular/Edema:  Normal rate and regular rhythm. Gastrointestinal:  Soft, nontender, + Bowel sounds  Neurologic:  She is alert and oriented to person, place, and time. Skin:  Skin is warm and dry. Bilateral lower extremity anterior tibial wounds      DATA:    CBC:   Lab Results   Component Value Date    WBC 16.2 07/25/2021    RBC 3.26 07/25/2021    HGB 9.4 07/25/2021    HCT 31.1 07/25/2021    MCV 95.4 07/25/2021    MCH 28.8 07/25/2021    MCHC 30.2 07/25/2021    RDW 17.9 07/25/2021     07/25/2021    MPV 10.3 07/25/2021     CMP:    Lab Results   Component Value Date     07/25/2021    K 3.9 07/25/2021    K 5.1 07/24/2021     07/25/2021    CO2 34 07/25/2021    BUN 81 07/25/2021    CREATININE 2.8 07/25/2021    GFRAA 19 07/25/2021    LABGLOM 16 07/25/2021    LABGLOM 33 03/28/2018    GLUCOSE 146 07/25/2021    GLUCOSE 263 03/28/2018    PROT 6.1 07/25/2021    LABALBU 3.1 07/25/2021    LABALBU 3.0 03/28/2018    CALCIUM 8.5 07/25/2021    BILITOT 0.3 07/25/2021    BILITOT NEGATIVE 10/03/2017    ALKPHOS 167 07/25/2021    AST 19 07/25/2021    ALT 21 07/25/2021     Magnesium:    Lab Results   Component Value Date    MG 1.6 06/13/2021     Phosphorus:    Lab Results   Component Value Date    PHOS 3.3 10/04/2017     Urine Studies:   Results for Jessica Olamide (MRN [id number]) as of 7/25/2021 10:02   Ref.  Range 7/24/2021 15:40 7/24/2021 15:40   Creatinine, Ur Latest Ref Range: 29 - 226 mg/dL 27 (L) 27 (L)   Microalbumin Creatinine Ratio Latest Ref Range: 0.0 - 30.0   58.1 (H)   Microalbumin, Random Urine Latest Ref Range: Not Established mg/L  15.7   Osmolality, Ur Latest Ref Range: 300 - 900 mOsm/kg 347    Protein, Ur Latest Ref Range: 0 - 12 mg/dL 5    Protein/Creat Ratio Latest Ref Range: 0.0 - 0.2  0.2 0.2   Urea Nitrogen, Ur Latest Ref Range: 800 - 1666 mg/dL 255 (L)          Radiology Review:              Chest Xray 7/23/21   Opacities in perihilar locations and lung bases related to bilateral   pneumonia or atelectasis and likely bilateral pleural effusions. CT Head 7/23/21   1.  Slightly limited exam, grossly negative for acute intracranial process,   within the limits of this non-contrast CT exam.       2.  Sequela of atherosclerotic arterial and chronic microvascular ischemic   disease, as described.       3.  Age-appropriate, generalized parenchymal volume loss.       4.  Paranasal sinus, left mastoid air cell complex, and bilateral external   auditory canal disease, as described.  The right mastoid air cell complex is   moderately/severely hypoplastic.       5.  Chronic osseous findings, as described       CT chest 7/23/21   1. Moderate pleural effusions, right larger than left.  Left pleural effusion   is partially loculated. 2. Right lower lobe consolidation and volume loss, likely atelectasis   although superimposed pneumonia/infiltrate not excluded. 3. Mild interstitial thickening probably represents mild interstitial edema. Renal US 7/24/21   Bilateral renal cortical thinning which suggest changes related to chronic   underlying medical renal disease.  No hydronephrosis.       The bladder was not distended for this exam and could not be evaluated.        IMPRESSION/RECOMMENDATIONS:    Briefly, Mrs. Lalito Godoy is a 80year old female with a past medical history of CKD Stage III baseline creatinine 2.5-2.7mg/dL, Anemia, Diabetes Mellitus type 2, Alzheimer's, Hypertension, CHF, who presented to the ER on 7/23/21 with complaints of shortness of breath. Upon arrival to the ER she was found to be hypoxic and was placed on nonrebreather and treated with duo nebs and steroids. Patient's daughter states she was diagnosed 2  Weeks ago with Pneumonia and was admitted to the hospital. She was discharged to a skilled nursing facility where she was noncompliant with her medications and inhalers. She was also found to be hyperkalemic and hyperkalemic protocol was iniated. Pertinent labs include: potassium: 5.9 mmol/L, BUN 81, Creatinine 3.1 mg/dL, Anion gap 18, CO2 21, Pro- BNP 7,692. Chest Xray was consistent with CHF exacerbation. Renal was consulted for Hyperkalemia and ANGELA on CKD. 1. ANGELA Stage I on CKD, likely secondary cardiorenal syndrome, non complaint with medications at skilled nursing facility. Minimal improvement in creatinine in last 24 hours. Non-oliguric. flid balance -3.4 liters so far with improving renal function    2. CKD stage IV,  baseline creatinine 2.5-2.7mg/dL, secondary to hypertensive nephrosclerosis and diabetic nephropathy. 3. Hyperkalemia, S/P hyperkalemic protocol, s/p Lokelma  4. CHF exacerbation, echo pending. BNP 7,692, on IV lasix BID  5. Acute respiratory failure, secondary to # 4. Requiring high levels of supplemental oxygen, 15 L HFNC, possible Thoracentesis in near future. 6. HTN, on Metoprolol  7. Anemia: Iron 39 mcg/dL, Iron Saturation 16%, TIBC 247 mcg/dL, to start iron infusions. 8. Nutrition, Regular diet, poor oral intake. 9. Low grade temp - cultures obtained- on vanc/cefepime    Plan:      · Obtain Echo- pending,   · Continue Lasix IV 40mg PO BID  · Stop Lokelma   · Start Iron Infusion protocol  · Strict Intake & Output  · Continue to monitor potassium level  · Continue to monitor renal function. · Possible Thoracentesis in near future.

## 2021-07-25 NOTE — CONSULTS
Inpatient Cardiology Consultation      Reason for Consult: CHF and elevated troponin (and new onset atrial fibrillation with pauses)    Consulting Physician: Dr. Angelika Douglas    Requesting Physician: Dr. Bebo Boothe    Date of Consultation: 7/25/2021    HISTORY OF PRESENT ILLNESS:     The following information is taken from a review of electronic medical records due to the patient's dementia and current irritability. This 70-year-old female has no cardiac history. She presented to the emergency room on July 23 with shortness of breath. She had hypoxic respiratory failure, CHF, hyperkalemia and ANGELA. Blood pressure on admission was 139/110 and then 148/55 and she was afebrile with an initial O2 saturation of 94% but  she was wearing a nasal cannula at 4 L. O2 saturation since dropped to 84% on high flow nasal cannula    EKG on admission was sinus rhythm with a first-degree AV block and a left bundle branch block and left axis deviation. Potassium was 5.6 and BUN and creatinine 81 and 3.1 (BUN and creatinine from 6/7/2021 was 59 and 3.1)  BNP 7692,  (BNP on 6/7/2021 was 238), serum glucose 305, troponin  58 and then 51, WBCs 9.5 with a hemoglobin of 10.2 and hematocrit of 33.6, negative for COVID-19. Chest x-ray right-sided pleural effusion. There was a right greater than left pleural effusion which appeared partially loculated on the left according to pulmonology notes. She was treated with antibiotics for urinary tract infection    EKG done this morning at 2 AM shows atrial fibrillation with rapid ventricular response and a left bundle branch block. A 2D echo has been ordered. Blood cultures are pending. She is being diuresed. Thoracentesis is tentatively planned per pulmonology. She is also being evaluated by nephrology and palliative care. She currently denies chest pain or dyspnea. This morning she began having pauses on the monitor which are at sometimes 3 to 5 seconds.   She denies any dizziness or presyncope or chest pain or dyspnea      Past medical history  1. Former smoker  2. Chronic kidney disease  3. Noncompliance  4. Hyperlipidemia  5. Hypertension  6. Diabetes which is  which is insulin requiring  7. Dementia  8. Dysphagia  9. 2D echocardiogram 9/11/2017, Dr. Amelia Morales, indication presyncope and EF 70%  Technically difficult and limited examination.        The left ventricle is normal size.        The left ventricular systolic function is normal.        Grade I - abnormal relaxation pattern.        Left atrium is not well visualized but appears to be enlarged.        The Aortic valve is sclerotic.        Stenosis does not appear to be present but cannot be ruled out.            10. Left bundle branch block in 2017  11. Major depressive disorder  12. Nonthrombocytopenic purpura  13. Pseudobulbar affect        Medications Prior to admit:  Prior to Admission medications    Medication Sig Start Date End Date Taking?  Authorizing Provider   acetaminophen (TYLENOL) 325 MG tablet Take 650 mg by mouth every 6 hours as needed for Pain   Yes Historical Provider, MD   aspirin 81 MG chewable tablet Take 81 mg by mouth daily   Yes Historical Provider, MD   bumetanide (BUMEX) 0.5 MG tablet Take 0.5 mg by mouth daily   Yes Historical Provider, MD   exenatide (BYETTA 5 MCG PEN) 5 MCG/0.02ML injection Inject 5 mcg into the skin every morning (before breakfast)   Yes Historical Provider, MD   citalopram (CELEXA) 20 MG tablet Take 20 mg by mouth daily   Yes Historical Provider, MD   doxycycline hyclate (VIBRAMYCIN) 100 MG capsule Take 100 mg by mouth 2 times daily Started on 7/22/21   Yes Historical Provider, MD   bisacodyl (DULCOLAX) 10 MG suppository Place 10 mg rectally daily as needed for Constipation   Yes Historical Provider, MD   ipratropium-albuterol (DUONEB) 0.5-2.5 (3) MG/3ML SOLN nebulizer solution Inhale 1 vial into the lungs every 2 hours as needed for Shortness of Breath   Yes Historical Provider, MD ferrous sulfate (IRON 325) 325 (65 Fe) MG tablet Take 325 mg by mouth daily (with breakfast)   Yes Historical Provider, MD   glucagon 1 MG injection Inject 1 kit into the muscle as needed (hypoglycemia)   Yes Historical Provider, MD   insulin lispro (HUMALOG) 100 UNIT/ML injection vial Inject 5 Units into the skin 3 times daily (before meals)   Yes Historical Provider, MD   insulin lispro (HUMALOG) 100 UNIT/ML injection vial Inject 0-9 Units into the skin 2 times daily (before meals) Per sliding scale   0-200 = 0 units  201-250 = 3 units  251-300 = 4 units  301-350 = 6 units  351-400 = 9 units   Yes Historical Provider, MD   levETIRAcetam (KEPPRA) 250 MG tablet Take 250 mg by mouth every morning (before breakfast)   Yes Historical Provider, MD   insulin detemir (LEVEMIR FLEXTOUCH) 100 UNIT/ML injection pen Inject 10 Units into the skin nightly   Yes Historical Provider, MD   insulin detemir (LEVEMIR FLEXTOUCH) 100 UNIT/ML injection pen Inject 45 Units into the skin every morning   Yes Historical Provider, MD   linagliptin (TRADJENTA) 5 MG tablet Take 5 mg by mouth daily   Yes Historical Provider, MD   atorvastatin (LIPITOR) 20 MG tablet Take 20 mg by mouth daily   Yes Historical Provider, MD   metOLazone (ZAROXOLYN) 2.5 MG tablet Take 2.5 mg by mouth daily Give 30 minutes prior to Bumex   Yes Historical Provider, MD   metoprolol tartrate (LOPRESSOR) 50 MG tablet Take 50 mg by mouth 2 times daily   Yes Historical Provider, MD   magnesium hydroxide (MILK OF MAGNESIA) 400 MG/5ML suspension Take 30 mLs by mouth daily as needed for Constipation   Yes Historical Provider, MD   mirtazapine (REMERON) 15 MG tablet Take 15 mg by mouth nightly   Yes Historical Provider, MD   Multiple Vitamins-Minerals (THERAPEUTIC MULTIVITAMIN-MINERALS) tablet Take 1 tablet by mouth daily   Yes Historical Provider, MD   amLODIPine (NORVASC) 5 MG tablet Take 5 mg by mouth daily   Yes Historical Provider, MD   dextromethorphan-quiNIDine (NUEDEXTA) 20-10 MG CAPS per capsule Take 1 capsule by mouth 2 times daily   Yes Historical Provider, MD   senna (SENOKOT) 8.6 MG tablet Take 1 tablet by mouth daily as needed for Constipation   Yes Historical Provider, MD   ascorbic acid (VITAMIN C) 500 MG tablet Take 500 mg by mouth 2 times daily   Yes Historical Provider, MD   ALPRAZolam (XANAX) 0.25 MG tablet Take 0.25 mg by mouth 2 times daily as needed for Anxiety.    Yes Historical Provider, MD   Sodium Phosphates (FLEET) 7-19 GM/118ML Place 1 enema rectally daily as needed   Yes Historical Provider, MD   vitamin D (CHOLECALCIFEROL) 78327 UNIT CAPS Take 50,000 Units by mouth once a week Given Fridays   Yes Historical Provider, MD       Current Medications:    Current Facility-Administered Medications: sodium zirconium cyclosilicate (LOKELMA) oral suspension 5 g, 5 g, Oral, BID  insulin lispro (HUMALOG) injection vial 0-18 Units, 0-18 Units, Subcutaneous, TID WC  insulin lispro (HUMALOG) injection vial 0-9 Units, 0-9 Units, Subcutaneous, Nightly  insulin glargine (LANTUS) injection vial 20 Units, 20 Units, Subcutaneous, Nightly  vancomycin 1500 mg in dextrose 5% 300 mL IVPB, 20 mg/kg, Intravenous, Once  perflutren lipid microspheres (DEFINITY) injection 1.65 mg, 1.5 mL, Intravenous, ONCE PRN  furosemide (LASIX) injection 40 mg, 40 mg, Intravenous, 2 times per day  aspirin chewable tablet 81 mg, 81 mg, Oral, Daily  ipratropium-albuterol (DUONEB) nebulizer solution 3 mL, 1 vial, Inhalation, Q2H PRN  levETIRAcetam (KEPPRA) tablet 250 mg, 250 mg, Oral, QAM AC  metoprolol tartrate (LOPRESSOR) tablet 25 mg, 25 mg, Oral, BID  mirtazapine (REMERON) tablet 15 mg, 15 mg, Oral, Nightly  sodium chloride flush 0.9 % injection 5-40 mL, 5-40 mL, Intravenous, 2 times per day  sodium chloride flush 0.9 % injection 5-40 mL, 5-40 mL, Intravenous, PRN  0.9 % sodium chloride infusion, 25 mL, Intravenous, PRN  polyethylene glycol (GLYCOLAX) packet 17 g, 17 g, Oral, Daily PRN  acetaminophen (TYLENOL) tablet 650 mg, 650 mg, Oral, Q6H PRN **OR** acetaminophen (TYLENOL) suppository 650 mg, 650 mg, Rectal, Q6H PRN  enoxaparin (LOVENOX) injection 30 mg, 30 mg, Subcutaneous, Daily  glucose (GLUTOSE) 40 % oral gel 15 g, 15 g, Oral, PRN  dextrose 50 % IV solution, 12.5 g, Intravenous, PRN  glucagon (rDNA) injection 1 mg, 1 mg, Intramuscular, PRN  dextrose 5 % solution, 100 mL/hr, Intravenous, PRN    Allergies:  Penicillins    Social History: Former smoker      Family History: Noncontributory  History reviewed. No pertinent family history. REVIEW OF SYSTEMS:     · Constitutional: Denies fatigue, fevers, chills or night sweats  · Eyes: Denies visual changes or drainage  · ENT: Denies headaches or hearing loss. No mouth sores or sore throat. No epistaxis   · Cardiovascular: Denies chest pain, pressure or palpitations. No lower extremity swelling. · Respiratory: Denies CARBALLO, cough, orthopnea or PND. No hemoptysis   · Gastrointestinal: Denies hematemesis or anorexia. No hematochezia or melena    · Genitourinary: Denies urgency, dysuria or hematuria. · Musculoskeletal: Denies gait disturbance, weakness or joint complaints  · Integumentary: Denies rash, hives or pruritis   · Neurological: Denies dizziness, headaches or seizures. No numbness or tingling  · Psychiatric: Denies anxiety or depression. · Endocrine: Denies temperature intolerance. No recent weight change. .  · Hematologic/Lymphatic: Denies abnormal bruising or bleeding. No swollen lymph nodes    PHYSICAL EXAM:   BP (!) 102/52   Pulse 97   Temp 98.1 °F (36.7 °C) (Oral)   Resp 18   Ht 5' 4\" (1.626 m)   Wt 164 lb 14.5 oz (74.8 kg)   SpO2 96%   BMI 28.31 kg/m²   CONST:  Well developed, well nourished who appears of stated age. Awake, alert and cooperative. No apparent distress.    HEENT:   Head- Normocephalic, atraumatic   Eyes- Conjunctivae pink, anicteric  Throat- Oral mucosa pink and moist  Neck-  No stridor, trachea midline, no jugular venous distention. No carotid bruit. CHEST: Chest symmetrical and non-tender to palpation. No accessory muscle use or intercostal retractions  RESPIRATORY: Lung sounds - clear throughout fields but with crackles on the right  CARDIOVASCULAR:     Heart Inspection- shows no noted pulsations  Heart Palpation- no heaves or thrills; PMI is non-displaced   Heart Ausculation-  irregularly irregular rate and rhythm, no murmur. No s3, s4 or rub   PV: No lower extremity edema on the right but there is edema of the left lower extremity. No varicosities. Pedal pulses palpable, no clubbing or cyanosis   ABDOMEN: Soft, non-tender to light palpation. Bowel sounds present. No palpable masses no organomegaly; no abdominal bruit  MS: Good muscle strength and tone. No atrophy or abnormal movements. : Deferred  SKIN: Warm and dry no statis dermatitis or ulcers   NEURO / PSYCH: Disoriented and combative at times  DATA:    ECG / Tele strips:  atrial fibrillation with borderline rapid ventricular response  Diagnostic:      Intake/Output Summary (Last 24 hours) at 7/25/2021 0847  Last data filed at 7/25/2021 0600  Gross per 24 hour   Intake 120 ml   Output 2100 ml   Net -1980 ml       Labs:   CBC:   Recent Labs     07/24/21  0607 07/25/21  0507   WBC 7.3 16.2*   HGB 8.7* 9.4*   HCT 29.0* 31.1*    326     BMP:   Recent Labs     07/24/21  1830 07/25/21  0507    146   K 4.8 3.9   CO2 28 34*   BUN 81* 81*   CREATININE 3.0* 2.8*   LABGLOM 15 16   CALCIUM 9.0 8.5*     Mag: No results for input(s): MG in the last 72 hours. Phos: No results for input(s): PHOS in the last 72 hours.   TFT:   Lab Results   Component Value Date    TSH 1.32 06/13/2021    T4FREE 0.98 09/20/2017      HgA1c:   Lab Results   Component Value Date    LABA1C 13.0 (H) 10/06/2017     Lab Results   Component Value Date     10/06/2017     proBNP:   Recent Labs     07/23/21  1204   PROBNP 7,692*  see note*     PT/INR: No results for input(s): PROTIME, INR in the last 72 hours. APTT:No results for input(s): APTT in the last 72 hours.   CARDIAC ENZYMES:  Recent Labs     07/23/21  1204 07/23/21  2056   TROPHS 58*  SEE NOTE* 51*     FASTING LIPID PANEL:  Lab Results   Component Value Date    CHOL 181 10/06/2017    HDL 45 10/06/2017    TRIG 168 10/06/2017     LIVER PROFILE:  Recent Labs     07/24/21  0607 07/25/21  0507   AST 18 19   ALT 24 21   LABALBU 2.8* 3.1*       Electronically signed by CRAIG Garrett CNP on 7/25/2021 at 8:47 AM

## 2021-07-25 NOTE — PROGRESS NOTES
I independently interviewed and examined the patient. I have reviewed the above documentation completed by the JOSSY. Please see my additional contributions to the HPI, physical exam, and assessment / medical decision making. Reason for consult: CHF and elevated troponin and new onset atrial fibrillation with pauses. She was not previously known to [facility]) cardiology. Mrs. Brooklyn Zavala is a 42-year-old  female with history of CKD, chronic left bundle branch block noncompliance, dementia, former smoker, type 2 diabetes, manic depression, HFpEF and hyperlipidemia who was admitted to hospital and 7/23/2021 with shortness of breath she was noted have a hypoxic respiratory failure CHF, hyperkalemia and acute kidney injury. In the ER her blood pressure was 139 110 afebrile O2 sats were 94% on 4 L of O2 subsequently her O2 sats dropped to 84. She does have significant memory issues and unable to provide any meaningful history. She is awake responds to commands and follows simple commands and. She denies any chest pain, dyspnea, palpitations or syncope. Most of the history was gathered from medical records. She was placed on nonrebreather. Apparently she was admitted with pneumonia about 2 weeks back and was discharged to skilled nursing facility. Patient has been combative and noncompliant with her medications. Her chest CT showed bilateral pleural effusions right greater than the left.     Review of Systems:  Cardiac: As per HPI  General: No fever, chills  Pulmonary: As per HPI  GI: No nausea, vomiting  Musculoskeletal: ROBB x 4, no focal motor deficits  Skin: Intact, no rashes  Neuro/Psych: No headache or seizures    Physical Exam:  BP (!) 87/50   Pulse 91   Temp 99.2 °F (37.3 °C) (Temporal)   Resp 18   Ht 5' 4\" (1.626 m)   Wt 164 lb 14.5 oz (74.8 kg)   SpO2 96%   BMI 28.31 kg/m²   Appearance: Awake, alert x2, no acute respiratory distress  Skin: Intact, no rash  Head: Normocephalic, atraumatic  ENMT: MMM, no rhinorrhea  Neck: Supple, no carotid bruits JVD appears normal.  Lungs: Has bibasilar Rales with a diminished breath sounds. Cardiac: Irregularly irregular rate and rhythm, tachycardic +S1S2, no murmurs apparent  Abdomen: Soft, +bowel sounds  Extremities: Moves all extremities x 4, no lower extremity edema  Neurologic: No focal motor deficits apparent, normal mood and affect    Telemetry findings reviewed: Atrial fibrillation with heart rate in the 90s to 110s she had a multiple episodes of pauses measuring about 4.6 ms and 1 pause 6.13 seconds at 10 AM today. .    EKG: Atrial fibrillation, left bundle branch block, left axis deviation, abnormal EKG. Vitals and labs were reviewed: Blood pressure 87/50 heart rate 91 on 15 L of O2    Labs-BUNs/creatinine 81/3.1> 81/2.8, high-sensitivity troponin 58>> 51>> 61, AST/ALT 19/21 WBC 16.2 H&H 9.4/31.1, urinalysis normal.  Blood gas pH 7.3 PO2 53 PCO2 38 O2 sats 85%    TTE-9/11/2017:  1. indication presyncope and EF 70%     Technically difficult and limited examination.        The left ventricle is normal size.        The left ventricular systolic function is normal.        Grade I - abnormal relaxation pattern.        Left atrium is not well visualized but appears to be enlarged.        The Aortic valve is sclerotic.        Stenosis does not appear to be present but cannot be ruled out.              Assessment:  · New onset atrial fibrillation with tacky bradycardia syndrome with pauses more than 6 seconds. · Acute respiratory failure secondary to bilateral pleural effusions and also HFrEF  · Acute on chronic HFpEF  · Bilateral pleural effusions  · Hypotension  · Senile dementia  · CKD stage III with ANGELA  · Former smoker  · Noncompliance with medications. Plan:   · Hold beta-blockers at this time due to his multiple episodes of pauses. Discussed with patient's daughter Mrs. Sosa (POA) about patient's wishes and she would like to go for

## 2021-07-25 NOTE — PROGRESS NOTES
Rate/rhythm change noted on monitor, ekg performed, showing new afib with LBBB, rate controlled in 90s, pt asymptomatic from previous baseline, /61, O2 sat 97% on 15L NRB. Will notify attending in morning.

## 2021-07-25 NOTE — PROGRESS NOTES
Pt found to be without her hi-flow NC on, slumped down in bed with tele monitor disconnected. O2 sat 70% on RA. Pt pulled up in bed, placed on 15L NRB, pt quickly recovered to 98%. Pt states, \"I just woke up like this. \" Pt at baseline alert to person and place.  requested at this time. Bed in lowest position, call light within reach.

## 2021-07-25 NOTE — PROGRESS NOTES
Dr. Linda Howard called to notify of pt going into afib at 0200, rate controlled, VSS. Message with call back number left at this time.

## 2021-07-25 NOTE — PROGRESS NOTES
Cardiology consult placed through [facility]. Message sent regarding updates  - CHF, elevated troponin, renal failure Patient currently afib (NEW from this morning at 1am and having frequent pauses. On admit SR first degree Largest pause 3.5.  Started having pauses at (7:45am)

## 2021-07-26 ENCOUNTER — APPOINTMENT (OUTPATIENT)
Dept: GENERAL RADIOLOGY | Age: 86
DRG: 291 | End: 2021-07-26
Payer: COMMERCIAL

## 2021-07-26 ENCOUNTER — APPOINTMENT (OUTPATIENT)
Dept: INTERVENTIONAL RADIOLOGY/VASCULAR | Age: 86
DRG: 291 | End: 2021-07-26
Payer: COMMERCIAL

## 2021-07-26 LAB
ALBUMIN SERPL-MCNC: 2.9 G/DL (ref 3.5–5.2)
ALP BLD-CCNC: 147 U/L (ref 35–104)
ALT SERPL-CCNC: 18 U/L (ref 0–32)
ANION GAP SERPL CALCULATED.3IONS-SCNC: 11 MMOL/L (ref 7–16)
ANION GAP SERPL CALCULATED.3IONS-SCNC: 9 MMOL/L (ref 7–16)
APTT: 30.4 SEC (ref 24.5–35.1)
AST SERPL-CCNC: 22 U/L (ref 0–31)
BASOPHILS ABSOLUTE: 0.04 E9/L (ref 0–0.2)
BASOPHILS RELATIVE PERCENT: 0.4 % (ref 0–2)
BILIRUB SERPL-MCNC: 0.3 MG/DL (ref 0–1.2)
BUN BLDV-MCNC: 66 MG/DL (ref 6–23)
BUN BLDV-MCNC: 71 MG/DL (ref 6–23)
CALCIUM SERPL-MCNC: 8.5 MG/DL (ref 8.6–10.2)
CALCIUM SERPL-MCNC: 8.8 MG/DL (ref 8.6–10.2)
CHLORIDE BLD-SCNC: 100 MMOL/L (ref 98–107)
CHLORIDE BLD-SCNC: 97 MMOL/L (ref 98–107)
CO2: 33 MMOL/L (ref 22–29)
CO2: 37 MMOL/L (ref 22–29)
CREAT SERPL-MCNC: 2.3 MG/DL (ref 0.5–1)
CREAT SERPL-MCNC: 2.6 MG/DL (ref 0.5–1)
EOSINOPHILS ABSOLUTE: 0.21 E9/L (ref 0.05–0.5)
EOSINOPHILS RELATIVE PERCENT: 2.2 % (ref 0–6)
FLUID TYPE: NORMAL
GFR AFRICAN AMERICAN: 21
GFR AFRICAN AMERICAN: 24
GFR NON-AFRICAN AMERICAN: 17 ML/MIN/1.73
GFR NON-AFRICAN AMERICAN: 20 ML/MIN/1.73
GLUCOSE BLD-MCNC: 157 MG/DL (ref 74–99)
GLUCOSE BLD-MCNC: 58 MG/DL (ref 74–99)
GLUCOSE, FLUID: 120 MG/DL
HCT VFR BLD CALC: 30.9 % (ref 34–48)
HEMOGLOBIN: 9.2 G/DL (ref 11.5–15.5)
IMMATURE GRANULOCYTES #: 0.06 E9/L
IMMATURE GRANULOCYTES %: 0.6 % (ref 0–5)
INR BLD: 1.2
LD, FLUID: 90 U/L
LYMPHOCYTES ABSOLUTE: 1.84 E9/L (ref 1.5–4)
LYMPHOCYTES RELATIVE PERCENT: 19.6 % (ref 20–42)
MCH RBC QN AUTO: 28.9 PG (ref 26–35)
MCHC RBC AUTO-ENTMCNC: 29.8 % (ref 32–34.5)
MCV RBC AUTO: 97.2 FL (ref 80–99.9)
METER GLUCOSE: 123 MG/DL (ref 74–99)
METER GLUCOSE: 156 MG/DL (ref 74–99)
METER GLUCOSE: 186 MG/DL (ref 74–99)
METER GLUCOSE: 235 MG/DL (ref 74–99)
METER GLUCOSE: 68 MG/DL (ref 74–99)
MONOCYTES ABSOLUTE: 1.16 E9/L (ref 0.1–0.95)
MONOCYTES RELATIVE PERCENT: 12.3 % (ref 2–12)
NEUTROPHILS ABSOLUTE: 6.09 E9/L (ref 1.8–7.3)
NEUTROPHILS RELATIVE PERCENT: 64.9 % (ref 43–80)
PDW BLD-RTO: 18.6 FL (ref 11.5–15)
PH VENOUS: 7.33 (ref 7.35–7.45)
PLATELET # BLD: 293 E9/L (ref 130–450)
PMV BLD AUTO: 10.3 FL (ref 7–12)
POTASSIUM SERPL-SCNC: 3.6 MMOL/L (ref 3.5–5)
POTASSIUM SERPL-SCNC: 3.8 MMOL/L (ref 3.5–5)
PROTEIN FLUID: 1.8 G/DL
PROTHROMBIN TIME: 13 SEC (ref 9.3–12.4)
RBC # BLD: 3.18 E12/L (ref 3.5–5.5)
SODIUM BLD-SCNC: 143 MMOL/L (ref 132–146)
SODIUM BLD-SCNC: 144 MMOL/L (ref 132–146)
TOTAL PROTEIN: 5.8 G/DL (ref 6.4–8.3)
WBC # BLD: 9.4 E9/L (ref 4.5–11.5)

## 2021-07-26 PROCEDURE — 85610 PROTHROMBIN TIME: CPT

## 2021-07-26 PROCEDURE — 2700000000 HC OXYGEN THERAPY PER DAY

## 2021-07-26 PROCEDURE — 2140000000 HC CCU INTERMEDIATE R&B

## 2021-07-26 PROCEDURE — 32555 ASPIRATE PLEURA W/ IMAGING: CPT

## 2021-07-26 PROCEDURE — 80053 COMPREHEN METABOLIC PANEL: CPT

## 2021-07-26 PROCEDURE — 6360000002 HC RX W HCPCS: Performed by: INTERNAL MEDICINE

## 2021-07-26 PROCEDURE — 6370000000 HC RX 637 (ALT 250 FOR IP): Performed by: INTERNAL MEDICINE

## 2021-07-26 PROCEDURE — 0W993ZZ DRAINAGE OF RIGHT PLEURAL CAVITY, PERCUTANEOUS APPROACH: ICD-10-PCS | Performed by: RADIOLOGY

## 2021-07-26 PROCEDURE — 87205 SMEAR GRAM STAIN: CPT

## 2021-07-26 PROCEDURE — 2580000003 HC RX 258: Performed by: NURSE PRACTITIONER

## 2021-07-26 PROCEDURE — C1729 CATH, DRAINAGE: HCPCS

## 2021-07-26 PROCEDURE — 87070 CULTURE OTHR SPECIMN AEROBIC: CPT

## 2021-07-26 PROCEDURE — 83986 ASSAY PH BODY FLUID NOS: CPT

## 2021-07-26 PROCEDURE — 71045 X-RAY EXAM CHEST 1 VIEW: CPT

## 2021-07-26 PROCEDURE — 2500000003 HC RX 250 WO HCPCS: Performed by: INTERNAL MEDICINE

## 2021-07-26 PROCEDURE — 32555 ASPIRATE PLEURA W/ IMAGING: CPT | Performed by: RADIOLOGY

## 2021-07-26 PROCEDURE — 6360000002 HC RX W HCPCS: Performed by: NURSE PRACTITIONER

## 2021-07-26 PROCEDURE — 82800 BLOOD PH: CPT

## 2021-07-26 PROCEDURE — 80048 BASIC METABOLIC PNL TOTAL CA: CPT

## 2021-07-26 PROCEDURE — 2500000003 HC RX 250 WO HCPCS: Performed by: RADIOLOGY

## 2021-07-26 PROCEDURE — 36415 COLL VENOUS BLD VENIPUNCTURE: CPT

## 2021-07-26 PROCEDURE — 2580000003 HC RX 258: Performed by: INTERNAL MEDICINE

## 2021-07-26 PROCEDURE — 99223 1ST HOSP IP/OBS HIGH 75: CPT | Performed by: INTERNAL MEDICINE

## 2021-07-26 PROCEDURE — 82962 GLUCOSE BLOOD TEST: CPT

## 2021-07-26 PROCEDURE — 84157 ASSAY OF PROTEIN OTHER: CPT

## 2021-07-26 PROCEDURE — 83615 LACTATE (LD) (LDH) ENZYME: CPT

## 2021-07-26 PROCEDURE — 82947 ASSAY GLUCOSE BLOOD QUANT: CPT

## 2021-07-26 PROCEDURE — 87075 CULTR BACTERIA EXCEPT BLOOD: CPT

## 2021-07-26 PROCEDURE — 85730 THROMBOPLASTIN TIME PARTIAL: CPT

## 2021-07-26 PROCEDURE — 85025 COMPLETE CBC W/AUTO DIFF WBC: CPT

## 2021-07-26 RX ORDER — NICOTINE POLACRILEX 4 MG
15 LOZENGE BUCCAL PRN
Status: DISCONTINUED | OUTPATIENT
Start: 2021-07-26 | End: 2021-07-26 | Stop reason: SDUPTHER

## 2021-07-26 RX ORDER — LIDOCAINE HYDROCHLORIDE 20 MG/ML
INJECTION, SOLUTION INFILTRATION; PERINEURAL
Status: COMPLETED | OUTPATIENT
Start: 2021-07-26 | End: 2021-07-26

## 2021-07-26 RX ORDER — DEXTROSE MONOHYDRATE 50 MG/ML
100 INJECTION, SOLUTION INTRAVENOUS PRN
Status: DISCONTINUED | OUTPATIENT
Start: 2021-07-26 | End: 2021-07-26 | Stop reason: SDUPTHER

## 2021-07-26 RX ORDER — DEXTROSE MONOHYDRATE 25 G/50ML
12.5 INJECTION, SOLUTION INTRAVENOUS PRN
Status: DISCONTINUED | OUTPATIENT
Start: 2021-07-26 | End: 2021-07-26 | Stop reason: SDUPTHER

## 2021-07-26 RX ORDER — DEXTROSE MONOHYDRATE 50 MG/ML
INJECTION, SOLUTION INTRAVENOUS CONTINUOUS
Status: DISCONTINUED | OUTPATIENT
Start: 2021-07-26 | End: 2021-07-28

## 2021-07-26 RX ADMIN — SODIUM CHLORIDE: 9 INJECTION, SOLUTION INTRAVENOUS at 10:00

## 2021-07-26 RX ADMIN — SODIUM CHLORIDE 125 MG: 9 INJECTION, SOLUTION INTRAVENOUS at 11:01

## 2021-07-26 RX ADMIN — DEXTROSE MONOHYDRATE 12.5 G: 25 INJECTION, SOLUTION INTRAVENOUS at 06:52

## 2021-07-26 RX ADMIN — CEFEPIME HYDROCHLORIDE 1000 MG: 2 INJECTION, POWDER, FOR SOLUTION INTRAVENOUS at 17:59

## 2021-07-26 RX ADMIN — LIDOCAINE HYDROCHLORIDE 4 ML: 20 INJECTION, SOLUTION INFILTRATION; PERINEURAL at 13:06

## 2021-07-26 RX ADMIN — FUROSEMIDE 20 MG: 10 INJECTION, SOLUTION INTRAMUSCULAR; INTRAVENOUS at 06:15

## 2021-07-26 RX ADMIN — INSULIN GLARGINE 20 UNITS: 100 INJECTION, SOLUTION SUBCUTANEOUS at 20:34

## 2021-07-26 RX ADMIN — CEFEPIME HYDROCHLORIDE 1000 MG: 2 INJECTION, POWDER, FOR SOLUTION INTRAVENOUS at 06:02

## 2021-07-26 RX ADMIN — INSULIN LISPRO 3 UNITS: 100 INJECTION, SOLUTION INTRAVENOUS; SUBCUTANEOUS at 17:24

## 2021-07-26 RX ADMIN — FUROSEMIDE 20 MG: 10 INJECTION, SOLUTION INTRAMUSCULAR; INTRAVENOUS at 16:15

## 2021-07-26 RX ADMIN — VANCOMYCIN HYDROCHLORIDE 750 MG: 10 INJECTION, POWDER, LYOPHILIZED, FOR SOLUTION INTRAVENOUS at 22:02

## 2021-07-26 RX ADMIN — INSULIN LISPRO 3 UNITS: 100 INJECTION, SOLUTION INTRAVENOUS; SUBCUTANEOUS at 20:34

## 2021-07-26 RX ADMIN — DEXTROSE MONOHYDRATE: 50 INJECTION, SOLUTION INTRAVENOUS at 18:08

## 2021-07-26 RX ADMIN — LEVETIRACETAM 250 MG: 500 TABLET ORAL at 06:16

## 2021-07-26 RX ADMIN — MIRTAZAPINE 15 MG: 15 TABLET, FILM COATED ORAL at 20:34

## 2021-07-26 RX ADMIN — SODIUM CHLORIDE, PRESERVATIVE FREE 10 ML: 5 INJECTION INTRAVENOUS at 16:15

## 2021-07-26 ASSESSMENT — PAIN SCALES - PAIN ASSESSMENT IN ADVANCED DEMENTIA (PAINAD)
CONSOLABILITY: 0
NEGVOCALIZATION: 0
BODYLANGUAGE: 0
FACIALEXPRESSION: 0
TOTALSCORE: 0
BREATHING: 0

## 2021-07-26 ASSESSMENT — PAIN SCALES - GENERAL
PAINLEVEL_OUTOF10: 0

## 2021-07-26 ASSESSMENT — PAIN SCALES - WONG BAKER: WONGBAKER_NUMERICALRESPONSE: 0

## 2021-07-26 NOTE — PROGRESS NOTES
Bobby Rodriguez M.D.,[facility]  Julian Lloyd D.O., F.A.C.O.I., Annelise uPrdy M.D. Jake Delatorre M.D., Uzair Valdovinos M.D. Moncho Nair D.O. Daily Pulmonary Progress Note    Patient:  Shannan Harris 80 y.o. female MRN: [id number]     Date of Service: 7/26/2021        Subjective        24-hour events:  Baseline confusion   S/p thoracentesis   Resting comfortably   Pleasantly confused. Objective   Vitals: /62   Pulse 85   Temp 98 °F (36.7 °C) (Temporal)   Resp 16   Ht 5' 4\" (1.626 m)   Wt 164 lb 14.5 oz (74.8 kg)   SpO2 95%   BMI 28.31 kg/m²     I/O:    Intake/Output Summary (Last 24 hours) at 7/26/2021 1340  Last data filed at 7/26/2021 1333  Gross per 24 hour   Intake 1380 ml   Output 1925 ml   Net -545 ml       Vent Information  SpO2: 95 %                CURRENT MEDS :  Scheduled Meds:   ferric gluconate (FERRLECIT) IVPB  125 mg Intravenous Daily    cefepime (MAXIPIME) 1000 mg IVPB extended (mini-bag)  1,000 mg Intravenous Q12H    And    sodium chloride   Intravenous Q12H    furosemide  20 mg Intravenous 2 times per day    insulin lispro  0-18 Units Subcutaneous TID WC    insulin lispro  0-9 Units Subcutaneous Nightly    insulin glargine  20 Units Subcutaneous Nightly    aspirin  81 mg Oral Daily    levETIRAcetam  250 mg Oral QAM AC    mirtazapine  15 mg Oral Nightly    sodium chloride flush  5-40 mL Intravenous 2 times per day    enoxaparin  30 mg Subcutaneous Daily       Physical Exam:  General Appearance: appears comfortable in no acute distress. HEENT: Normocephalic atraumatic without obvious abnormality   Neck: Lips, mucosa, and tongue normal.  Supple, symmetrical, trachea midline, no adenopathy;thyroid:  no enlargement/tenderness/nodules or JVD. Lung: Breath sounds CTA. Respirations   unlabored. Symmetrical expansion. Heart: RRR, normal S1, S2. No MRG  Abdomen: Soft, NT, ND. BS present x 4 quadrants. No bruit or organomegaly.    Extremities: Pedal pulses 2+ symmetric b/l. Extremities normal, no cyanosis, clubbing, or edema. Musculokeletal: No joint swelling, no muscle tenderness. ROM normal in all joints of extremities. Neurologic: Mental status: Alert and Oriented X3 . Pertinent/ New Labs and Imaging Studies     Imaging Personally Reviewed:    7/26 cxr illustrates improved effusion s/p thoracentesis   Labs:  Lab Results   Component Value Date    WBC 9.4 07/26/2021    HGB 9.2 07/26/2021    HCT 30.9 07/26/2021    MCV 97.2 07/26/2021    MCH 28.9 07/26/2021    MCHC 29.8 07/26/2021    RDW 18.6 07/26/2021     07/26/2021    MPV 10.3 07/26/2021     Lab Results   Component Value Date     07/26/2021    K 3.8 07/26/2021    K 5.1 07/24/2021     07/26/2021    CO2 33 07/26/2021    BUN 71 07/26/2021    CREATININE 2.6 07/26/2021    LABALBU 2.9 07/26/2021    LABALBU 3.0 03/28/2018    CALCIUM 8.5 07/26/2021    GFRAA 21 07/26/2021    LABGLOM 17 07/26/2021    LABGLOM 33 03/28/2018     Lab Results   Component Value Date    PROTIME 13.0 07/26/2021    PROTIME 12.2 06/07/2021    INR 1.2 07/26/2021     No results for input(s): PROBNP in the last 72 hours. Recent Labs     07/25/21  0940   PROCAL 0.12*     This SmartLink has not been configured with any valid records. Micro:  No results for input(s): CULTRESP in the last 72 hours. No results for input(s): LABGRAM in the last 72 hours. No results for input(s): LEGUR in the last 72 hours. No results for input(s): STREPNEUMAGU in the last 72 hours. No results for input(s): LP1UAG in the last 72 hours. Assessment:    1. Acute hypoxic respiratory failure secondary to pleural effusions/atelectasis  2. Bilateral pleural effusions right greater than left, appearance of partially loculated component to left pleural effusion s/p right sided thoracentesis   3. Acute decompensated CHF  4. ANGELA on CKD  5. Dementia  6. Medical noncompliance      Plan:   1. chf per cardiology   2. S/p thoracentesis. Breathing improved. 3. Wean o2   4. [address] Evelin Slade for dc from pulmonary pov. Anne Brunson M.D.    Pulmonary/Critical Care Medicine

## 2021-07-26 NOTE — PATIENT CARE CONFERENCE
P Quality Flow/Interdisciplinary Rounds Progress Note        Quality Flow Rounds held on July 26, 2021    Disciplines Attending:  Bedside Nurse, ,  and Nursing Unit Leadership    Ariana Morales was admitted on 7/23/2021 11:33 AM    Anticipated Discharge Date:  Expected Discharge Date: 07/27/21    Disposition:    Rigo Score:  Rigo Scale Score: 14    Readmission Risk              Risk of Unplanned Readmission:  27           Discussed patient goal for the day, patient clinical progression, and barriers to discharge.   The following Goal(s) of the Day/Commitment(s) have been identified:  IR thoracentesis today, complete ECHO      Martha Muir RN  July 26, 2021

## 2021-07-26 NOTE — PROGRESS NOTES
Department of Internal Medicine  Nephrology Progress Note    Events reviewed. Subjective: We are following MsNic Harding for Hyperkalemia and ANGELA on CKD. She is confused. PHYSICAL EXAM:      Vitals:    VITALS:  BP (!) 110/53   Pulse 89   Temp 99.3 °F (37.4 °C) (Temporal)   Resp 18   Ht 5' 4\" (1.626 m)   Wt 164 lb 14.5 oz (74.8 kg)   SpO2 95%   BMI 28.31 kg/m²   24HR INTAKE/OUTPUT:      Intake/Output Summary (Last 24 hours) at 7/26/2021 1215  Last data filed at 7/26/2021 0645  Gross per 24 hour   Intake 530 ml   Output 2675 ml   Net -2145 ml       Constitutional:  Alert, oriented to person only  HEENT:  Normocephalic and atraumatic. Respiratory:  Diminished. On 15L HFNC  Cardiovascular/Edema:  Normal rate and regular rhythm. Gastrointestinal:  Soft, nontender, + Bowel sounds  Neurologic:  She is alert and oriented to person, place, and time. Skin:  Skin is warm and dry. Bilateral lower extremity anterior tibial wounds    Scheduled Meds:   ferric gluconate (FERRLECIT) IVPB  125 mg Intravenous Daily    cefepime (MAXIPIME) 1000 mg IVPB extended (mini-bag)  1,000 mg Intravenous Q12H    And    sodium chloride   Intravenous Q12H    furosemide  20 mg Intravenous 2 times per day    insulin lispro  0-18 Units Subcutaneous TID WC    insulin lispro  0-9 Units Subcutaneous Nightly    insulin glargine  20 Units Subcutaneous Nightly    aspirin  81 mg Oral Daily    levETIRAcetam  250 mg Oral QAM AC    mirtazapine  15 mg Oral Nightly    sodium chloride flush  5-40 mL Intravenous 2 times per day    enoxaparin  30 mg Subcutaneous Daily     Continuous Infusions:   sodium chloride      dextrose       PRN Meds:. LORazepam, perflutren lipid microspheres, ipratropium-albuterol, sodium chloride flush, sodium chloride, polyethylene glycol, acetaminophen **OR** acetaminophen, glucose, dextrose, glucagon (rDNA), dextrose    DATA:    CBC:   Lab Results   Component Value Date    WBC 9.4 07/26/2021    RBC 3.18 07/26/2021    HGB 9.2 07/26/2021    HCT 30.9 07/26/2021    MCV 97.2 07/26/2021    MCH 28.9 07/26/2021    MCHC 29.8 07/26/2021    RDW 18.6 07/26/2021     07/26/2021    MPV 10.3 07/26/2021     CMP:    Lab Results   Component Value Date     07/26/2021    K 3.8 07/26/2021    K 5.1 07/24/2021     07/26/2021    CO2 33 07/26/2021    BUN 71 07/26/2021    CREATININE 2.6 07/26/2021    GFRAA 21 07/26/2021    LABGLOM 17 07/26/2021    LABGLOM 33 03/28/2018    GLUCOSE 58 07/26/2021    GLUCOSE 263 03/28/2018    PROT 5.8 07/26/2021    LABALBU 2.9 07/26/2021    LABALBU 3.0 03/28/2018    CALCIUM 8.5 07/26/2021    BILITOT 0.3 07/26/2021    BILITOT NEGATIVE 10/03/2017    ALKPHOS 147 07/26/2021    AST 22 07/26/2021    ALT 18 07/26/2021     Magnesium:    Lab Results   Component Value Date    MG 1.6 06/13/2021     Phosphorus:    Lab Results   Component Value Date    PHOS 3.3 10/04/2017     Urine Studies:   Results for Jasmyne Gallo (MRN [id number]) as of 7/25/2021 10:02   Ref.  Range 7/24/2021 15:40 7/24/2021 15:40   Creatinine, Ur Latest Ref Range: 29 - 226 mg/dL 27 (L) 27 (L)   Microalbumin Creatinine Ratio Latest Ref Range: 0.0 - 30.0   58.1 (H)   Microalbumin, Random Urine Latest Ref Range: Not Established mg/L  15.7   Osmolality, Ur Latest Ref Range: 300 - 900 mOsm/kg 347    Protein, Ur Latest Ref Range: 0 - 12 mg/dL 5    Protein/Creat Ratio Latest Ref Range: 0.0 - 0.2  0.2 0.2   Urea Nitrogen, Ur Latest Ref Range: 800 - 1666 mg/dL 255 (L)          Radiology Review:      CT Head 7/23/21   1.  Slightly limited exam, grossly negative for acute intracranial process,   within the limits of this non-contrast CT exam.       2.  Sequela of atherosclerotic arterial and chronic microvascular ischemic   disease, as described.       3.  Age-appropriate, generalized parenchymal volume loss.       4.  Paranasal sinus, left mastoid air cell complex, and bilateral external   auditory canal disease, as described.  The right mastoid air cell complex is   moderately/severely hypoplastic.       5.  Chronic osseous findings, as described     CT chest 7/23/21   1. Moderate pleural effusions, right larger than left.  Left pleural effusion   is partially loculated. 2. Right lower lobe consolidation and volume loss, likely atelectasis   although superimposed pneumonia/infiltrate not excluded. 3. Mild interstitial thickening probably represents mild interstitial edema. Renal US 7/24/21   Bilateral renal cortical thinning which suggest changes related to chronic   underlying medical renal disease.  No hydronephrosis.       The bladder was not distended for this exam and could not be evaluated. CXR 7/25/2021   1.  Interval slight increase in the right greater than left pleural effusions   and mid to lower lung opacities.  No large pneumothorax.       2.  Atherosclerotic disease, cardiomegaly, and central pulmonary vascular   congestion are unchanged. BRIEF SUMMARY OF INITIAL CONSULT:    Briefly, Mrs. Alda Wang is a 80year old female with a past medical history of CKD Stage III baseline creatinine 2.5-2.7mg/dL, Anemia, Diabetes Mellitus type 2, Alzheimer's, Hypertension, CHF, who presented to the ER on 7/23/21 with complaints of shortness of breath. Upon arrival to the ER she was found to be hypoxic and was placed on nonrebreather and treated with duo nebs and steroids. Patient's daughter states she was diagnosed 2  Weeks ago with Pneumonia and was admitted to the hospital. She was discharged to a skilled nursing facility where she was noncompliant with her medications and inhalers. She was also found to be hyperkalemic and hyperkalemic protocol was iniated. Pertinent labs include: potassium: 5.9 mmol/L, BUN 81, Creatinine 3.1 mg/dL, Anion gap 18, CO2 21, Pro- BNP 7,692. Chest Xray was consistent with CHF exacerbation. Renal was consulted for Hyperkalemia and ANGELA on CKD.     Problems resolved:  · Hyperkalemia, S/P hyperkalemic protocol, s/p Lokelma    IMPRESSION/RECOMMENDATIONS:      1. ANGELA stage I on CKD, likely hemodynamically mediated pre-renal ANGELA secondary to cardiorenal syndrome, non complaint with medications at skilled nursing facility. Nonoliguric. Renal function continues to improve with diuretic administration. 2. CKD stage IV, without proteinuria, baseline creatinine 2.5-2.7mg/dL, secondary to hypertensive nephrosclerosis. 3. Hypernatremia, secondary to lack of free water intake. We will start on D5W.   4. Elevated bicarbonate level, likely secondary to metabolic alkalosis (contraction alkalosis) vs respiratory acidosis. To obtain venous pH to confirm diagnosis. 5. HTN, BP medications on hold due to hypotension   6. HF, echo pending, pro-BNP 7,692, on Lasix  7. Acute respiratory failure, secondary to # 4. Requiring high levels of supplemental oxygen, 15 L HFNC. For thoracentesis today. 8. Right pleural effusion, for thoracentesis today. 9. New onset PAF with multiple pauses, metoprolol on hold   10. Anemia: Iron 39 mcg/dL, Iron Saturation 16%, TIBC 247 mcg/dL, on IV iron. 11. Low grade temp, cultures obtained, on vanc/cefepime  12.  Nutrition, NPO for thoracentesis     Plan:    · Start D5W at 50 cc/hr  · Continue Lasix IV 20 mg IV BID  · Continue IV iron   · Strict I&Os  · Monitor sodium level  · Continue to monitor potassium level  · Continue to monitor renal function, BMP 4 PM   · Obtain venous pH  · Obtain pro-BNP in AM  · Obtain magnesium and phosphorus levels in AM      Electronically signed by CRAIG Miranda CNP on 7/26/2021 at 12:38 PM    Agree with above, discussed with the NP    Senthil Bravo MD

## 2021-07-26 NOTE — PROGRESS NOTES
Pharmacy Consultation Note  (Antibiotic Dosing and Monitoring)    Initial consult date: 21  Consulting physician: Dr. Regina Little  Drug: Vancomycin  Indication: Pneumonia (HAP)    Age/  Gender Height Weight IBW  Allergy Information   89 y.o./female 5' 4\" (162.6 cm) 176 lb (79.8 kg)     Ideal body weight: 54.7 kg (120 lb 9.5 oz)  Adjusted ideal body weight: 62.7 kg (138 lb 5.1 oz)   Penicillins      Temp (24hrs), Av.2 °F (36.8 °C), Min:97.2 °F (36.2 °C), Max:99.3 °F (37.4 °C)          Date  WBC BUN SCr CrCl  (mL/min) Drug/Dose Time   Given Level(s)   (Time) Comments    -- -- -- --  -  LD marked as not given    16.2 81 2.8 12 Vancomycin 1500 mg IV  1533      9.4 71 2.6 13 Vancomycin 750mg x 1  <1530>                      Intake/Output Summary (Last 24 hours) at 2021 1403  Last data filed at 2021 1333  Gross per 24 hour   Intake 1380 ml   Output 1925 ml   Net -545 ml       Historical Cultures:  No results found for: ORG  No results for input(s): BC in the last 72 hours.     Cultures:  available culture and sensitivity results were reviewed in [facility]    Assessment:  · 80 y.o. female admitted for SOB, pneumonia   · Empirically started on vancomycin and cefepime  · ANGELA on CKD  · Vancomycin load not given in ER  per MAR  · Estimated CrCl = 12 mL/min  · Goal trough level = 15-20 mcg/mL; AUC/LATASHA = 400-600    Plan:  · Will order vancomycin 750 mg IV x 1  · Random level in AM; will dose by levels  · Monitor renal function     Roxanne Delatorre, PharmD, BCPS 2021 2:04 PM

## 2021-07-26 NOTE — PLAN OF CARE
Problem: Injury - Risk of, Physical Injury:  Goal: Absence of physical injury  Description: Absence of physical injury  Outcome: Met This Shift     Problem: Falls - Risk of:  Goal: Absence of physical injury  Description: Absence of physical injury  Outcome: Met This Shift     Problem: Cardiac:  Goal: Hemodynamic stability will improve  Description: Hemodynamic stability will improve  Outcome: Met This Shift     Problem: Skin Integrity:  Goal: Will show no infection signs and symptoms  Description: Will show no infection signs and symptoms  Outcome: Met This Shift     Problem: Confusion - Acute:  Goal: Absence of continued neurological deterioration signs and symptoms  Description: Absence of continued neurological deterioration signs and symptoms  Outcome: Ongoing     Problem: Gas Exchange - Impaired:  Goal: Levels of oxygenation will improve  Description: Levels of oxygenation will improve  Outcome: Ongoing

## 2021-07-26 NOTE — CONSULTS
Cardiac Electrophysiology Consultation Note    Javon Barber  [birth year]  Date of Service: 7/26/2021  PCP: Jenn Covington DO    Reason for Consultation:  Pauses    SUBJECTIVE: Javon Barber is a 81 yo female who I was asked to see in Cardiac Electrophysiology consultation for SSS, Afib with pauses. She does not wake up or respond thus history of gotten from chart. She has history of  CKD, chronic left bundle branch block, noncompliance, dementia, former smoker, type 2 diabetes, manic depression, HFpEF and hyperlipidemia. She was hospitalized on 7/23/21 with sob, hypoxia- Requiring high levels of supplemental oxygen,  up to 15 L HFNC, possible thoracentesis in near future, CHF, ANGELA. She had recent pneumonia at OSH 2 weeks ago per her daughter Laxmi Hong whom I spoke with on the phone   Chest CT shows Pleural effusions, pneumonia, WBC 16K yesterday, HGB 9.2, Cr 2.3. She is chronically on Metoprolol 25 mg bid, last dose7/25 AM  EKG 7/23/21 showed NSR 50, IVCD, first degree AV block  On monitor, she has had Afib with multiple pauses up to 6 secs, highest rate 120's.         Patient Active Problem List    Diagnosis Date Noted    Paroxysmal atrial fibrillation ([address] 07/25/2021    Prolonged Q-T interval on ECG 07/23/2021    Acute congestive heart failure ([address] 07/23/2021    Acute hypoxemic respiratory failure ([address] 07/23/2021    Acute kidney injury superimposed on CKD ([address] 07/23/2021    Stage 4 chronic kidney disease ([address] 04/30/2021    Anemia of chronic disease 04/30/2021    Type 2 diabetes mellitus without complication, with long-term current use of insulin ([address] 04/30/2021    Hypertension 04/30/2021    Hyperlipidemia 04/30/2021    Vascular dementia without behavioral disturbance ([address] 04/30/2021    Situational depression 04/30/2021    Vitamin D deficiency 04/30/2021    Osteoarthritis 04/30/2021    Overactive bladder 04/30/2021       Past Medical History:   Diagnosis Date    Acute kidney failure 07/25/21 2046    perflutren lipid microspheres (DEFINITY) injection 1.65 mg  1.5 mL Intravenous ONCE PRN Nessa Escoto MD        aspirin chewable tablet 81 mg  81 mg Oral Daily Nessa Escoto MD   81 mg at 07/25/21 0840    ipratropium-albuterol (DUONEB) nebulizer solution 3 mL  1 vial Inhalation Q2H PRN Nessa Escoto MD        levETIRAcetam (KEPPRA) tablet 250 mg  250 mg Oral QAM AC Nessa Escoto MD   250 mg at 07/26/21 2569    mirtazapine (REMERON) tablet 15 mg  15 mg Oral Nightly Nessa Escoto MD   15 mg at 07/25/21 2047    sodium chloride flush 0.9 % injection 5-40 mL  5-40 mL Intravenous 2 times per day Nessa Escoto MD   10 mL at 07/25/21 2047    sodium chloride flush 0.9 % injection 5-40 mL  5-40 mL Intravenous PRN Nessa Escoto MD        0.9 % sodium chloride infusion  25 mL Intravenous PRN Nessa Escoto MD        polyethylene glycol [facility]) packet 17 g  17 g Oral Daily PRN Nessa Escoto MD        acetaminophen (TYLENOL) tablet 650 mg  650 mg Oral Q6H PRN Nessa Escoto MD        Or   Ton Taylor acetaminophen (TYLENOL) suppository 650 mg  650 mg Rectal Q6H PRN Nessa Escoto MD        enoxaparin (LOVENOX) injection 30 mg  30 mg Subcutaneous Daily Nessa Escoto MD   30 mg at 07/25/21 2047    glucose (GLUTOSE) 40 % oral gel 15 g  15 g Oral PRN Nessa Escoto MD        dextrose 50 % IV solution  12.5 g Intravenous PRN Nessa Escoto MD   12.5 g at 07/26/21 2288    glucagon (rDNA) injection 1 mg  1 mg Intramuscular PRN Nessa Escoto MD        dextrose 5 % solution  100 mL/hr Intravenous PRN Nessa Escoto MD            Allergies   Allergen Reactions    Penicillins        ROS:   Unable to obtain      PHYSICAL EXAM:  Vitals:    07/26/21 0015 07/26/21 0600 07/26/21 0744 07/26/21 1045   BP: 110/69 117/62 108/68 (!) 110/53   Pulse: 110 98 75 89   Resp: 20 18 18 18   Temp: 98 °F (36.7 °C) 98.4 °F (36.9 °C) 98.5 °F (36.9 °C) 99.3 °F (37.4 °C)   TempSrc: Oral Oral Temporal Temporal   SpO2: 94% 96% 97% 95%   Weight: Height:         Constitutional: Does not wake up  Head: Normocephalic and atraumatic. Neck:  no JVD present. Cardiovascular: S1 normal, S2 positive. An irregular rhythm present. Pulmonary/Chest:  No respiratory distress. Abdominal:  Normal appearance   Musculoskeletal: Unable to assess  Neurological: Unable to assess  Skin: Skin is warm and dry. Extremity: No visible clubbing or cyanosis. No edema. Psychiatric: Unable to assess    Pertinent Labs:  CBC:   Recent Labs     07/24/21  0607 07/25/21  0507 07/26/21  0614   WBC 7.3 16.2* 9.4   HGB 8.7* 9.4* 9.2*   HCT 29.0* 31.1* 30.9*    326 293     BMP:  Recent Labs     07/24/21  1830 07/25/21  0507 07/26/21  0614    146 144   K 4.8 3.9 3.8   CL 97* 100 100   CO2 28 34* 33*   BUN 81* 81* 71*   CREATININE 3.0* 2.8* 2.6*   CALCIUM 9.0 8.5* 8.5*     ABGs:   Lab Results   Component Value Date    PHART 7.31 06/09/2021    PO2ART 67 06/09/2021    VXO9BBG 59 06/09/2021     INR:   Recent Labs     07/26/21  0856   INR 1.2       TSH:   Lab Results   Component Value Date    TSH 1.32 06/13/2021        Lipid Profile:   Lab Results   Component Value Date    TRIG 168 10/06/2017    HDL 45 10/06/2017    CHOL 181 10/06/2017      Xray:   XR CHEST PORTABLE   Final Result   1. Interval slight increase in the right greater than left pleural effusions   and mid to lower lung opacities. No large pneumothorax. 2.  Atherosclerotic disease, cardiomegaly, and central pulmonary vascular   congestion are unchanged. US RETROPERITONEAL COMPLETE   Final Result   Bilateral renal cortical thinning which suggest changes related to chronic   underlying medical renal disease. No hydronephrosis. The bladder was not distended for this exam and could not be evaluated. CT Head WO Contrast   Final Result   1.   Slightly limited exam, grossly negative for acute intracranial process,   within the limits of this non-contrast CT exam.      2.  Sequela of atherosclerotic arterial and chronic microvascular ischemic   disease, as described. 3.  Age-appropriate, generalized parenchymal volume loss. 4.  Paranasal sinus, left mastoid air cell complex, and bilateral external   auditory canal disease, as described. The right mastoid air cell complex is   moderately/severely hypoplastic. 5.  Chronic osseous findings, as described      . CT CHEST WO CONTRAST   Final Result   1. Moderate pleural effusions, right larger than left. Left pleural effusion   is partially loculated. 2. Right lower lobe consolidation and volume loss, likely atelectasis   although superimposed pneumonia/infiltrate not excluded. 3. Mild interstitial thickening probably represents mild interstitial edema. XR CHEST PORTABLE   Final Result   Opacities in perihilar locations and lung bases related to bilateral   pneumonia or atelectasis and likely bilateral pleural effusions. IR INTERVENTIONAL RADIOLOGY PROCEDURE REQUEST    (Results Pending)   US THORACENTESIS Which side should the procedure be performed? Right    (Results Pending)         Pertinent Cardiac Testin TTE  LVEF 70%  LAE      Telemetry2021: AF with multiple pauses up to 6 secs    I have independently reviewed all of the ECGs and rhythm strips per above. I have personally reviewed the laboratory, cardiac diagnostic and radiographic testing as outlined above. I have reviewed previous records noted in [address]. Impression:    1. SSS  - Chronic LBBB  - Now with atrial fibrillation with multiple pauses up to 6 secs  - on metoprolol 25 mg bid, last dose  AM  - I had long discussion with daughter and she agreed that mother would not want pacemaker or invasive procedures or implants. I have recommended doing conservative management for her rhythm. Avoid AV jamal agents and psychotropic drugs that would exacerbate bradycardia.  Given her metal status, advanced dementia, I agree with conservative management  - Treat infection/ pneumonia  - Continue supportive care    2. LBBB  - Chronic    3. PAF    4. ANGELA  - Nephrology following    5. Acute CHF  - ECHO pending    6. Hypoxia  - recent bilateral pneumonia. Continues to have pneumonia and pleural effusions on chest xray    7. Demetia  - Severe at baseline    Plan :  I had long discussion with daughter and she agreed that mother would not want pacemaker or invasive procedures or implants. I have recommended doing conservative management for her rhythm. Avoid AV jamal agents and psychotropic drugs that would exacerbate bradycardia. Given her metal status, advanced dementia, I agree with conservative management    I will sign off. Pls call if any additional questions    I have spent a total of 110 minutes with the patient and his/her family reviewing the above stated recommendations. A total of >50% of that time involved face-to-face time providing counseling and or coordination of care with the other providers. Thank you for allowing me to participate in your patient's care. [facility] Cardiac Electrophysiology  [address] [facility]    NOTE: This report was transcribed using voice recognition software. Every effort was made to ensure accuracy; however, inadvertent computerized transcription errors may be present.

## 2021-07-26 NOTE — PROGRESS NOTES
Patient ripped the breathing mask and adamantly refuses to put it back on. Placed nasal cannula, which patient tolerates better so far. Will monitor O2 saturation closely.

## 2021-07-26 NOTE — DISCHARGE INSTR - COC
Continuity of Care Form    Patient Name: Kacy De Anda   :  1926  MRN:  [id number]    Admit date:  2021  Discharge date:  21      Code Status Order: Limited   Advance Directives:      Admitting Physician:  No admitting provider for patient encounter. PCP: Iveth Greene DO    Discharging Nurse: [facility] Unit/Room#: 6176/5987-E  Discharging Unit Phone Number: [phone]    Emergency Contact:   Extended Emergency Contact Information  Primary Emergency Contact: Kathryn Sosa   [address] Phone: [phone]  Relation: Child    Past Surgical History:  History reviewed. No pertinent surgical history. Immunization History: There is no immunization history on file for this patient.     Active Problems:  Patient Active Problem List   Diagnosis Code    Stage 4 chronic kidney disease ([facility] N18.4    Anemia of chronic disease D63.8    Type 2 diabetes mellitus without complication, with long-term current use of insulin ([facility] E11.9, Z79.4    Hypertension I10    Hyperlipidemia E78.5    Vascular dementia without behavioral disturbance (HCC) F01.50    Situational depression F43.21    Vitamin D deficiency E55.9    Osteoarthritis M19.90    Overactive bladder N32.81    Prolonged Q-T interval on ECG R94.31    Acute congestive heart failure (HCC) I50.9    Acute hypoxemic respiratory failure (HCC) J96.01    Acute kidney injury superimposed on CKD (HCC) N17.9, N18.9    Paroxysmal atrial fibrillation (HCC) I48.0       Isolation/Infection:   Isolation            No Isolation          Patient Infection Status       Infection Onset Added Last Indicated Last Indicated By Review Planned Expiration Resolved Resolved By    None active    Resolved    COVID-19 Rule Out 21 COVID-19, Rapid (Ordered)   21 Rule-Out Test Resulted            Nurse Assessment:  Last Vital Signs: /63   Pulse 79   Temp 98 °F (36.7 °C) (Temporal)   Resp 18   Ht 5' 4\" (1.626 EDT    CASE MANAGEMENT/SOCIAL WORK SECTION    Inpatient Status Date: ***    Readmission Risk Assessment Score:  Readmission Risk              Risk of Unplanned Readmission:  27           Discharging to Facility/ Agency   Name: [facility]. Address:  Phone:  Fax:    Dialysis Facility (if applicable)   Name:  Address:  Dialysis Schedule:  Phone:  Fax:    / signature: Electronically signed by MANSI Manrique on 7/26/2021 at 12:40 PM      PHYSICIAN SECTION    Prognosis: Fair    Condition at Discharge: Stable    Rehab Potential (if transferring to Rehab): Fair    Recommended Labs or Other Treatments After Discharge: CBC and BMP in 3 days    Physician Certification: I certify the above information and transfer of Rudy Malin  is necessary for the continuing treatment of the diagnosis listed and that she requires [facility] for greater 30 days.      Update Admission H&P: No change in H&P    PHYSICIAN SIGNATURE:  Electronically signed by Scottie Gonzalez MD on 8/4/21 at 4:19 PM EDT

## 2021-07-26 NOTE — PLAN OF CARE
Patient's chart updated to reflect:      . - HF care plan, HF education points and HF discharge instructions.  -Orders: 2 gram sodium diet, daily weights, I/O.  -PCP and/or Cardiologist appointment to be scheduled within 7 days of hospital discharge. Currently palliative care is on board with discussions regarding hospice care post discharge.   Ella Mckee RN RN, BSN  Heart Failure Navigator

## 2021-07-26 NOTE — INTERVAL H&P NOTE
H&P Update    Patient's History and Physical  was reviewed. The patient appears likely to able to tolerate the procedure. Risk and benefits discussed including ultimate complications, possibly death and consent obtained.     Erlinda Perales, II

## 2021-07-26 NOTE — BRIEF OP NOTE
Brief Postoperative Note    Laci Aguilar  YOB: 1926  [id number]    Pre-operative Diagnosis and Procedure: right pleural effusion plan thoracentesis    Post-operative Diagnosis: Same    Anesthesia: Local    Estimated Blood Loss: < 10 cc    Surgeon: Gretta Dakins D.     Complications: none    Specimen obtained: yes  Findings: none     Mckay Lamb II, MD   7/26/2021 12:57 PM

## 2021-07-26 NOTE — CARE COORDINATION
SOCIAL WORK/CASEMANAGEMENT TRANSITION OF CARE [id number] Lashawn Johnson, [address], [place], [phone]): met with pt but confused. She is from [facility] [place] and is long term there and uses a w/c now only per daughter, jonel, who wants pt to return there. All discharge paperwork is in place. Pt is snf/loc no bed held but they will take pt back. PT and OT to see and precert is needed for pt to return as well as a rapid covid. Sw/cm to follow.  MANSI Aceves  7/26/2021

## 2021-07-27 LAB
ALBUMIN SERPL-MCNC: 3 G/DL (ref 3.5–5.2)
ALP BLD-CCNC: 136 U/L (ref 35–104)
ALT SERPL-CCNC: 17 U/L (ref 0–32)
ANION GAP SERPL CALCULATED.3IONS-SCNC: 8 MMOL/L (ref 7–16)
AST SERPL-CCNC: 20 U/L (ref 0–31)
BASOPHILS ABSOLUTE: 0.03 E9/L (ref 0–0.2)
BASOPHILS RELATIVE PERCENT: 0.3 % (ref 0–2)
BILIRUB SERPL-MCNC: 0.4 MG/DL (ref 0–1.2)
BUN BLDV-MCNC: 56 MG/DL (ref 6–23)
CALCIUM SERPL-MCNC: 8.6 MG/DL (ref 8.6–10.2)
CHLORIDE BLD-SCNC: 98 MMOL/L (ref 98–107)
CO2: 38 MMOL/L (ref 22–29)
CREAT SERPL-MCNC: 2.1 MG/DL (ref 0.5–1)
EOSINOPHILS ABSOLUTE: 0.37 E9/L (ref 0.05–0.5)
EOSINOPHILS RELATIVE PERCENT: 3.5 % (ref 0–6)
GFR AFRICAN AMERICAN: 26
GFR NON-AFRICAN AMERICAN: 22 ML/MIN/1.73
GLUCOSE BLD-MCNC: 106 MG/DL (ref 74–99)
GRAM STAIN ORDERABLE: NORMAL
HCT VFR BLD CALC: 32.6 % (ref 34–48)
HEMOGLOBIN: 9.7 G/DL (ref 11.5–15.5)
IMMATURE GRANULOCYTES #: 0.06 E9/L
IMMATURE GRANULOCYTES %: 0.6 % (ref 0–5)
LYMPHOCYTES ABSOLUTE: 1.14 E9/L (ref 1.5–4)
LYMPHOCYTES RELATIVE PERCENT: 10.8 % (ref 20–42)
MAGNESIUM: 1.9 MG/DL (ref 1.6–2.6)
MCH RBC QN AUTO: 28.5 PG (ref 26–35)
MCHC RBC AUTO-ENTMCNC: 29.8 % (ref 32–34.5)
MCV RBC AUTO: 95.9 FL (ref 80–99.9)
METER GLUCOSE: 110 MG/DL (ref 74–99)
METER GLUCOSE: 142 MG/DL (ref 74–99)
METER GLUCOSE: 193 MG/DL (ref 74–99)
METER GLUCOSE: 222 MG/DL (ref 74–99)
MONOCYTES ABSOLUTE: 1.12 E9/L (ref 0.1–0.95)
MONOCYTES RELATIVE PERCENT: 10.6 % (ref 2–12)
NEUTROPHILS ABSOLUTE: 7.85 E9/L (ref 1.8–7.3)
NEUTROPHILS RELATIVE PERCENT: 74.2 % (ref 43–80)
PDW BLD-RTO: 18 FL (ref 11.5–15)
PHOSPHORUS: 3.2 MG/DL (ref 2.5–4.5)
PLATELET # BLD: 255 E9/L (ref 130–450)
PMV BLD AUTO: 10.2 FL (ref 7–12)
POTASSIUM SERPL-SCNC: 3.8 MMOL/L (ref 3.5–5)
PRO-BNP: 8190 PG/ML (ref 0–450)
RBC # BLD: 3.4 E12/L (ref 3.5–5.5)
SODIUM BLD-SCNC: 144 MMOL/L (ref 132–146)
TOTAL PROTEIN: 5.7 G/DL (ref 6.4–8.3)
VANCOMYCIN RANDOM: 20.3 MCG/ML (ref 5–40)
WBC # BLD: 10.6 E9/L (ref 4.5–11.5)

## 2021-07-27 PROCEDURE — 99232 SBSQ HOSP IP/OBS MODERATE 35: CPT | Performed by: INTERNAL MEDICINE

## 2021-07-27 PROCEDURE — 2140000000 HC CCU INTERMEDIATE R&B

## 2021-07-27 PROCEDURE — 2580000003 HC RX 258: Performed by: INTERNAL MEDICINE

## 2021-07-27 PROCEDURE — 97165 OT EVAL LOW COMPLEX 30 MIN: CPT

## 2021-07-27 PROCEDURE — 85025 COMPLETE CBC W/AUTO DIFF WBC: CPT

## 2021-07-27 PROCEDURE — 82962 GLUCOSE BLOOD TEST: CPT

## 2021-07-27 PROCEDURE — 6370000000 HC RX 637 (ALT 250 FOR IP): Performed by: INTERNAL MEDICINE

## 2021-07-27 PROCEDURE — 84100 ASSAY OF PHOSPHORUS: CPT

## 2021-07-27 PROCEDURE — 6360000002 HC RX W HCPCS: Performed by: NURSE PRACTITIONER

## 2021-07-27 PROCEDURE — 83880 ASSAY OF NATRIURETIC PEPTIDE: CPT

## 2021-07-27 PROCEDURE — 6360000002 HC RX W HCPCS: Performed by: INTERNAL MEDICINE

## 2021-07-27 PROCEDURE — 97162 PT EVAL MOD COMPLEX 30 MIN: CPT

## 2021-07-27 PROCEDURE — 99233 SBSQ HOSP IP/OBS HIGH 50: CPT | Performed by: INTERNAL MEDICINE

## 2021-07-27 PROCEDURE — 80202 ASSAY OF VANCOMYCIN: CPT

## 2021-07-27 PROCEDURE — 80053 COMPREHEN METABOLIC PANEL: CPT

## 2021-07-27 PROCEDURE — 36415 COLL VENOUS BLD VENIPUNCTURE: CPT

## 2021-07-27 PROCEDURE — 2580000003 HC RX 258: Performed by: NURSE PRACTITIONER

## 2021-07-27 PROCEDURE — 2700000000 HC OXYGEN THERAPY PER DAY

## 2021-07-27 PROCEDURE — 97530 THERAPEUTIC ACTIVITIES: CPT

## 2021-07-27 PROCEDURE — 83735 ASSAY OF MAGNESIUM: CPT

## 2021-07-27 RX ADMIN — MIRTAZAPINE 15 MG: 15 TABLET, FILM COATED ORAL at 22:06

## 2021-07-27 RX ADMIN — SODIUM CHLORIDE 125 MG: 9 INJECTION, SOLUTION INTRAVENOUS at 12:05

## 2021-07-27 RX ADMIN — Medication 10 ML: at 08:51

## 2021-07-27 RX ADMIN — ENOXAPARIN SODIUM 30 MG: 30 INJECTION SUBCUTANEOUS at 22:06

## 2021-07-27 RX ADMIN — FUROSEMIDE 20 MG: 10 INJECTION, SOLUTION INTRAMUSCULAR; INTRAVENOUS at 06:06

## 2021-07-27 RX ADMIN — DEXTROSE MONOHYDRATE: 50 INJECTION, SOLUTION INTRAVENOUS at 15:55

## 2021-07-27 RX ADMIN — ASPIRIN 81 MG: 81 TABLET, CHEWABLE ORAL at 08:51

## 2021-07-27 RX ADMIN — INSULIN LISPRO 3 UNITS: 100 INJECTION, SOLUTION INTRAVENOUS; SUBCUTANEOUS at 22:06

## 2021-07-27 RX ADMIN — SODIUM CHLORIDE, PRESERVATIVE FREE 10 ML: 5 INJECTION INTRAVENOUS at 06:07

## 2021-07-27 RX ADMIN — FUROSEMIDE 20 MG: 10 INJECTION, SOLUTION INTRAMUSCULAR; INTRAVENOUS at 15:53

## 2021-07-27 RX ADMIN — INSULIN GLARGINE 20 UNITS: 100 INJECTION, SOLUTION SUBCUTANEOUS at 22:05

## 2021-07-27 RX ADMIN — LEVETIRACETAM 250 MG: 500 TABLET ORAL at 06:11

## 2021-07-27 RX ADMIN — CEFEPIME HYDROCHLORIDE 1000 MG: 2 INJECTION, POWDER, FOR SOLUTION INTRAVENOUS at 18:04

## 2021-07-27 RX ADMIN — Medication 5 ML: at 22:06

## 2021-07-27 RX ADMIN — INSULIN LISPRO 3 UNITS: 100 INJECTION, SOLUTION INTRAVENOUS; SUBCUTANEOUS at 12:01

## 2021-07-27 RX ADMIN — CEFEPIME HYDROCHLORIDE 1000 MG: 2 INJECTION, POWDER, FOR SOLUTION INTRAVENOUS at 06:57

## 2021-07-27 ASSESSMENT — PAIN SCALES - PAIN ASSESSMENT IN ADVANCED DEMENTIA (PAINAD)
BODYLANGUAGE: 0
TOTALSCORE: 0
FACIALEXPRESSION: 0
TOTALSCORE: 0
BODYLANGUAGE: 0
NEGVOCALIZATION: 0
TOTALSCORE: 0
BREATHING: 0
BREATHING: 0
TOTALSCORE: 0
FACIALEXPRESSION: 0
CONSOLABILITY: 0
NEGVOCALIZATION: 0
FACIALEXPRESSION: 0
BODYLANGUAGE: 0
CONSOLABILITY: 0
FACIALEXPRESSION: 0
BREATHING: 0
CONSOLABILITY: 0
CONSOLABILITY: 0
BODYLANGUAGE: 0
BREATHING: 0

## 2021-07-27 ASSESSMENT — PAIN SCALES - WONG BAKER: WONGBAKER_NUMERICALRESPONSE: 0

## 2021-07-27 ASSESSMENT — PAIN SCALES - GENERAL
PAINLEVEL_OUTOF10: 0

## 2021-07-27 NOTE — PROGRESS NOTES
Physical Therapy  Physical Therapy Initial Assessment     Name: Tonie Mauro  : 1926  MRN: [id number]      Date of Service: 2021    Evaluating PT:  Miguel Ga., PT, DPT [id number]    Room #:  6370/6527-H  Diagnosis:  Acute congestive heart failure, unspecified heart failure type ([facility] [address] [I50.9]  PMHx/PSHx:  Dementia, HTN, Alzheimer's  Procedure/Surgery:  n/a  Precautions:  Falls, TSM, TAPS, Bed alarm  Equipment Needs:  TBD by facility    SUBJECTIVE:    Pt unreliable historian. Per chart, pt from SNF. Unclear PLOF, pt states she ambs with ww. OBJECTIVE:   Initial Evaluation  Date: 21 Treatment Short Term/ Long Term   Goals   AM-PAC 6 Clicks 75/55     Was pt agreeable to Eval/treatment? yes     Does pt have pain? Reports pain in LLE, does not quantify     Bed Mobility  Rolling: modA  Supine to sit: modA  Sit to supine: modA  Scooting: modA  Rolling: sup  Supine to sit: sup  Sit to supine: sup  Scooting: sup   Transfers Sit to stand: modA  Stand to sit: modA  Stand pivot: NT  Sit to stand: sup  Stand to sit: sup  Stand pivot: sup   Ambulation    NT  25', LRAD, sup   Stair negotiation: ascended and descended NT  n/a   ROM BUE:  Refer to OT note  BLE:  WFL     Strength BUE:  Refer to OT note  BLE:  Grossly 4/5  5/5   Balance Sitting EOB:  SBA-Sharron  Dynamic Standing:  Min-modA  Sitting EOB:  sup  Dynamic Standing:  sup     Pt is A & O x 1, oriented to person only. Sensation:  Pt denies numbness and tingling to extremities  Edema:  unremarkable    Vitals: resting HR, 96 BPM, 95% spo2 via NC, HR during tx elevating to 140's, further activity immediately deferred and pt returned to supine. [facility] staff notified. Patient education  Pt educated on role of PT, tx, balance, current status and POC, d/c planning.      Patient response to education:   Pt verbalized understanding Pt demonstrated skill Pt requires further education in this area   yes partial All areas     ASSESSMENT:    Conditions Requiring Skilled Therapeutic Intervention:    [x]Decreased strength     []Decreased ROM  [x]Decreased functional mobility  [x]Decreased balance   [x]Decreased endurance   [x]Decreased posture  []Decreased sensation  []Decreased coordination   []Decreased vision  [x]Decreased safety awareness   []Increased pain       Comments:  Pt supine with o2 via high flow NC, telemetry, bed alarm on, navarro, agreeable  to therapy with encouragement. RN providing clearance for therapy session prior to entering room. Pt unreliable historian, oriented to self, drowsy for first portion of session, difficult to understand speech, mumbling incoherent phrases and at times perseverating on refusing due to not having money to pay. PT explained role of self and plan to assessing functional mobility. Pt required modA to tx to EOB. Pt completed brief stand to attempt to scoot to [facility] to lie down but upon standing pt deferring side stepping due to weakness. Upon sitting pt's HR elevated to 140's. Pt immediately place into supine position to rest. Pt positioned to comfort, bed alarm on, and call light in reach at end of session. Rn notified of performance and vitals. Pt would benefit form continued therapy services to address deficits listed. Will continue to follow POC. Treatment:  Patient practiced and was instructed in the following treatment:     Bed mobility training - pt given verbal and tactile cues to facilitate proper sequencing and safety during rolling and supine>sit as well as provided with physical assistance to complete task    Sitting EOB for >2 minutes for upright tolerance, postural awareness and BLE ROM   STS- x1 reps, standing for ~ 8 seconds, pt deferring side steps due to weakness, retropulsive, modA to steady   Skilled repositioning- to aide in promoting skin and joint integrity, as well as optimizing rspiratory function    Pt's/ family goals   1.  Pt unable to specify at this time    Prognosis is fair for reaching above PT goals. Patient and or family understand(s) diagnosis, prognosis, and plan of care. Follow up needed    PHYSICAL THERAPY PLAN OF CARE:    PT POC is established based on physician order and patient diagnosis     Referring provider/PT Order:    Start   Ordering Provider    07/23/21 1900  PT eval and treat Start: 07/23/21 1900, End: 07/23/21 1900, ONE TIME, Standing Count: 1 Occurrences, R      Yves Lyon MD        Diagnosis:  Acute congestive heart failure, unspecified heart failure type ([facility] [address] [I50.9]  Specific instructions for next treatment:  Progress sitting balance and upright tolerance. Current Treatment Recommendations:     [x] Strengthening to improve independence with functional mobility   [x] ROM to improve independence with functional mobility   [x] Balance Training to improve static/dynamic balance and to reduce fall risk  [x] Endurance Training to improve activity tolerance during functional mobility   [x] Transfer Training to improve safety and independence with all functional transfers   [x] Gait Training to improve gait mechanics, endurance and assess need for appropriate assistive device  [] Stair Training in preparation for safe discharge home and/or into the community   [x] Positioning to prevent skin breakdown and contractures  [x] Safety and Education Training   [x] Patient/Caregiver Education   [x] HEP  [] Other     PT long term treatment goals are located in above grid    Frequency of treatments: 2-5x/week x 1-2 weeks. Time in  0940  Time out  1000    Total Treatment Time  10 minutes     Evaluation Time includes thorough review of current medical information, gathering information on past medical history/social history and prior level of function, completion of standardized testing/informal observation of tasks, assessment of data and education on plan of care and goals.     CPT codes:  [] Low Complexity PT evaluation 52263  [x] Moderate Complexity PT evaluation 38761  [] High Complexity PT evaluation [id number]  [] PT Re-evaluation [id number]  [] Gait training 53917 0 minutes  [] Manual therapy 61461 0 minutes  [] Therapeutic activities 49097 10 minutes  [x] Therapeutic exercises 39303 0 minutes  [] Neuromuscular reeducation 71379 0 minutes     Brendon Durant, PT, DPT  [id number]

## 2021-07-27 NOTE — PROGRESS NOTES
Zulma Dumont M.D.,[place]  Becka Whitt D.O., F.A.C.O.I., Rick Cook M.D. Vandy Burkitt, M.D., Tri Weaver M.D. Mesfin Delgado D.O. Daily Pulmonary Progress Note    Patient:  Avery Weeks 80 y.o. female MRN: [id number]     Date of Service: 7/27/2021        Subjective      Patient was seen and examined lying in bed in no apparent distress. She is currently on 15 L of oxygen and is easily aroused. She denies dyspnea and cough, lung sounds are clear. Right-sided thoracentesis was done on 7/26, with 850 cc removed. Family is considering hospice after discharge. 24-hour events:  None    Objective   Vitals: BP (!) 114/50   Pulse 85   Temp 99.3 °F (37.4 °C) (Temporal)   Resp 18   Ht 5' 4\" (1.626 m)   Wt 158 lb (71.7 kg)   SpO2 100%   BMI 27.12 kg/m²     I/O:    Intake/Output Summary (Last 24 hours) at 7/27/2021 1731  Last data filed at 7/27/2021 1724  Gross per 24 hour   Intake 1154 ml   Output 4400 ml   Net -3246 ml       Vent Information  SpO2: 100 %                CURRENT MEDS :  Scheduled Meds:   vancomycin (VANCOCIN) intermittent dosing (placeholder)   Other RX Placeholder    ferric gluconate (FERRLECIT) IVPB  125 mg Intravenous Daily    cefepime (MAXIPIME) 1000 mg IVPB extended (mini-bag)  1,000 mg Intravenous Q12H    And    sodium chloride   Intravenous Q12H    furosemide  20 mg Intravenous 2 times per day    insulin lispro  0-18 Units Subcutaneous TID WC    insulin lispro  0-9 Units Subcutaneous Nightly    insulin glargine  20 Units Subcutaneous Nightly    aspirin  81 mg Oral Daily    levETIRAcetam  250 mg Oral QAM AC    mirtazapine  15 mg Oral Nightly    sodium chloride flush  5-40 mL Intravenous 2 times per day    enoxaparin  30 mg Subcutaneous Daily       Physical Exam:  General Appearance: appears comfortable in no acute distress.    HEENT: Normocephalic atraumatic without obvious abnormality   Neck: Lips, mucosa, and tongue normal. Supple, symmetrical, trachea midline, no adenopathy;thyroid:  no enlargement/tenderness/nodules or JVD. Lung: Breath sounds CTA. Respirations   unlabored. Symmetrical expansion. Heart: RRR, normal S1, S2. No MRG  Abdomen: Soft, NT, ND. BS present x 4 quadrants. No bruit or organomegaly. Extremities: Pedal pulses 2+ symmetric b/l. Extremities normal, no cyanosis, clubbing, or edema. Musculokeletal: No joint swelling, no muscle tenderness. ROM normal in all joints of extremities. Neurologic: Mental status: Alert and Oriented X3 . Pertinent/ New Labs and Imaging Studies     Imaging Personally Reviewed:    7/26 cxr illustrates improved effusion s/p thoracentesis   Labs:  Lab Results   Component Value Date    WBC 10.6 07/27/2021    HGB 9.7 07/27/2021    HCT 32.6 07/27/2021    MCV 95.9 07/27/2021    MCH 28.5 07/27/2021    MCHC 29.8 07/27/2021    RDW 18.0 07/27/2021     07/27/2021    MPV 10.2 07/27/2021     Lab Results   Component Value Date     07/27/2021    K 3.8 07/27/2021    K 5.1 07/24/2021    CL 98 07/27/2021    CO2 38 07/27/2021    BUN 56 07/27/2021    CREATININE 2.1 07/27/2021    LABALBU 3.0 07/27/2021    LABALBU 3.0 03/28/2018    CALCIUM 8.6 07/27/2021    GFRAA 26 07/27/2021    LABGLOM 22 07/27/2021    LABGLOM 33 03/28/2018     Lab Results   Component Value Date    PROTIME 13.0 07/26/2021    PROTIME 12.2 06/07/2021    INR 1.2 07/26/2021     Recent Labs     07/27/21  0523   PROBNP 8,190*     Recent Labs     07/25/21  0940   PROCAL 0.12*     This SmartLink has not been configured with any valid records. Micro:  No results for input(s): CULTRESP in the last 72 hours. Recent Labs     07/26/21  1315   LABGRAM Refer to ordered Gram stain for results     No results for input(s): LEGUR in the last 72 hours. No results for input(s): STREPNEUMAGU in the last 72 hours. No results for input(s): LP1UAG in the last 72 hours. Assessment:    1.  Acute hypoxic respiratory failure secondary to pleural effusions/atelectasis  2. Bilateral pleural effusions right greater than left, appearance of partially loculated component to left pleural effusion s/p right sided thoracentesis   3. Acute decompensated CHF  4. ANGELA on CKD  5. Dementia  6. Medical noncompliance      Plan:   1. chf per cardiology   2. S/p thoracentesis, see above. Breathing improved. 3. Wean o2 as tolerated, currently on 15 L  4. Continuous pulse ox  5. Recommend swallow study, probably not necessary if she is going to be under hospice care    Plan of care reviewed in collaboration with Dr. Rodrigo Rushing, APRN-CNP    I personally saw, examined, and cared for the patient. Labs, medications, radiographs reviewed. I agree with history exam and plans detailed in NP note.     Electronically signed by Baudilio Bergman MD on 7/28/2021 at 2:16 AM

## 2021-07-27 NOTE — PLAN OF CARE
Problem: Gas Exchange - Impaired:  Goal: Levels of oxygenation will improve  Description: Levels of oxygenation will improve  7/26/2021 2352 by Lazaro Negrete RN  Outcome: Met This Shift

## 2021-07-27 NOTE — PROGRESS NOTES
Spoke to Dr. Jeffy Trammell regarding patient going back into Afib RVR- sustaining 's-140's for an hour. No orders received at this time. Will continue to monitor.

## 2021-07-27 NOTE — PROGRESS NOTES
Department of Internal Medicine  Nephrology Progress Note    Events reviewed. Subjective: We are following Ms. Sana Carranza for Hyperkalemia and ANGELA on CKD. She remains confused. PHYSICAL EXAM:      Vitals:    VITALS:  BP (!) 116/49   Pulse 83   Temp 98 °F (36.7 °C) (Temporal)   Resp 18   Ht 5' 4\" (1.626 m)   Wt 158 lb (71.7 kg)   SpO2 100%   BMI 27.12 kg/m²   24HR INTAKE/OUTPUT:      Intake/Output Summary (Last 24 hours) at 7/27/2021 1255  Last data filed at 7/27/2021 0615  Gross per 24 hour   Intake 1904 ml   Output 2825 ml   Net -921 ml       Constitutional:  Alert, oriented to person only  HEENT:  Normocephalic and atraumatic. Respiratory:  Diminished. On 15L HFNC  Cardiovascular/Edema:  Normal rate and regular rhythm. Gastrointestinal:  Soft, nontender, + Bowel sounds  Neurologic:  She is alert and oriented to person, place, and time. Skin:  Skin is warm and dry. Bilateral lower extremity anterior tibial wounds    Scheduled Meds:   vancomycin (VANCOCIN) intermittent dosing (placeholder)   Other RX Placeholder    ferric gluconate (FERRLECIT) IVPB  125 mg Intravenous Daily    cefepime (MAXIPIME) 1000 mg IVPB extended (mini-bag)  1,000 mg Intravenous Q12H    And    sodium chloride   Intravenous Q12H    furosemide  20 mg Intravenous 2 times per day    insulin lispro  0-18 Units Subcutaneous TID WC    insulin lispro  0-9 Units Subcutaneous Nightly    insulin glargine  20 Units Subcutaneous Nightly    aspirin  81 mg Oral Daily    levETIRAcetam  250 mg Oral QAM AC    mirtazapine  15 mg Oral Nightly    sodium chloride flush  5-40 mL Intravenous 2 times per day    enoxaparin  30 mg Subcutaneous Daily     Continuous Infusions:   dextrose 50 mL/hr at 07/26/21 1808    sodium chloride      dextrose       PRN Meds:. LORazepam, perflutren lipid microspheres, ipratropium-albuterol, sodium chloride flush, sodium chloride, polyethylene glycol, acetaminophen **OR** acetaminophen, glucose, dextrose, glucagon (rDNA), dextrose    DATA:    CBC:   Lab Results   Component Value Date    WBC 10.6 07/27/2021    RBC 3.40 07/27/2021    HGB 9.7 07/27/2021    HCT 32.6 07/27/2021    MCV 95.9 07/27/2021    MCH 28.5 07/27/2021    MCHC 29.8 07/27/2021    RDW 18.0 07/27/2021     07/27/2021    MPV 10.2 07/27/2021     CMP:    Lab Results   Component Value Date     07/27/2021    K 3.8 07/27/2021    K 5.1 07/24/2021    CL 98 07/27/2021    CO2 38 07/27/2021    BUN 56 07/27/2021    CREATININE 2.1 07/27/2021    GFRAA 26 07/27/2021    LABGLOM 22 07/27/2021    LABGLOM 33 03/28/2018    GLUCOSE 106 07/27/2021    GLUCOSE 263 03/28/2018    PROT 5.7 07/27/2021    LABALBU 3.0 07/27/2021    LABALBU 3.0 03/28/2018    CALCIUM 8.6 07/27/2021    BILITOT 0.4 07/27/2021    BILITOT NEGATIVE 10/03/2017    ALKPHOS 136 07/27/2021    AST 20 07/27/2021    ALT 17 07/27/2021     Magnesium:    Lab Results   Component Value Date    MG 1.9 07/27/2021     Phosphorus:    Lab Results   Component Value Date    PHOS 3.2 07/27/2021     Urine Studies:   Results for Pradeep Mcmillan (MRN [id number]) as of 7/25/2021 10:02   Ref. Range 7/24/2021 15:40 7/24/2021 15:40   Creatinine, Ur Latest Ref Range: 29 - 226 mg/dL 27 (L) 27 (L)   Microalbumin Creatinine Ratio Latest Ref Range: 0.0 - 30.0   58.1 (H)   Microalbumin, Random Urine Latest Ref Range: Not Established mg/L  15.7   Osmolality, Ur Latest Ref Range: 300 - 900 mOsm/kg 347    Protein, Ur Latest Ref Range: 0 - 12 mg/dL 5    Protein/Creat Ratio Latest Ref Range: 0.0 - 0.2  0.2 0.2   Urea Nitrogen, Ur Latest Ref Range: 800 - 1666 mg/dL 255 (L)          Radiology Review:      CT Head 7/23/21   1.  Slightly limited exam, grossly negative for acute intracranial process,   within the limits of this non-contrast CT exam.       2.  Sequela of atherosclerotic arterial and chronic microvascular ischemic   disease, as described.       3.  Age-appropriate, generalized parenchymal volume loss.       4.  Paranasal sinus, left mastoid air cell complex, and bilateral external   auditory canal disease, as described.  The right mastoid air cell complex is   moderately/severely hypoplastic.       5.  Chronic osseous findings, as described     CT chest 7/23/21   1. Moderate pleural effusions, right larger than left.  Left pleural effusion   is partially loculated. 2. Right lower lobe consolidation and volume loss, likely atelectasis   although superimposed pneumonia/infiltrate not excluded. 3. Mild interstitial thickening probably represents mild interstitial edema. Renal US 7/24/21   Bilateral renal cortical thinning which suggest changes related to chronic   underlying medical renal disease.  No hydronephrosis.       The bladder was not distended for this exam and could not be evaluated. CXR 7/25/2021   1.  Interval slight increase in the right greater than left pleural effusions   and mid to lower lung opacities.  No large pneumothorax.       2.  Atherosclerotic disease, cardiomegaly, and central pulmonary vascular   congestion are unchanged. BRIEF SUMMARY OF INITIAL CONSULT:    Briefly, Mrs. Taqueria Boyce is a 80year old female with a past medical history of CKD Stage III baseline creatinine 2.5-2.7mg/dL, Anemia, Diabetes Mellitus type 2, Alzheimer's, Hypertension, CHF, who presented to the ER on 7/23/21 with complaints of shortness of breath. Upon arrival to the ER she was found to be hypoxic and was placed on nonrebreather and treated with duo nebs and steroids. Patient's daughter states she was diagnosed 2  Weeks ago with Pneumonia and was admitted to the hospital. She was discharged to a skilled nursing facility where she was noncompliant with her medications and inhalers. She was also found to be hyperkalemic and hyperkalemic protocol was iniated. Pertinent labs include: potassium: 5.9 mmol/L, BUN 81, Creatinine 3.1 mg/dL, Anion gap 18, CO2 21, Pro- BNP 7,692.  Chest Xray was consistent with CHF exacerbation. Renal was consulted for Hyperkalemia and ANGELA on CKD. Problems resolved:  · Hyperkalemia, S/P hyperkalemic protocol, s/p Lokelma    IMPRESSION/RECOMMENDATIONS:      1. ANGELA stage I on CKD, likely hemodynamically mediated pre-renal ANGELA secondary to cardiorenal syndrome, non complaint with medications at skilled nursing facility. Nonoliguric. Renal function continues to improve with diuretic administration, creatinine 2.1 mg/dl. 2. CKD stage IV, without proteinuria, baseline creatinine 2.5-2.7mg/dL, secondary to hypertensive nephrosclerosis. 3. Hypernatremia, secondary to lack of free water intake. We will start on D5W.   4. Acidemia, (pH 7.33), 2/2 respiratory acidosis  5. HTN, BP medications on hold due to hypotension   6. HF, echo pending, pro-BNP 7,692>>8,190, on Lasix  7. Acute respiratory failure, secondary to # 4. Requiring high levels of supplemental oxygen, 15 L HFNC. For thoracentesis today. 8. Right pleural effusion, for thoracentesis today. 9. New onset PAF with multiple pauses, metoprolol on hold   10. Anemia: Iron 39 mcg/dL, Iron Saturation 16%, TIBC 247 mcg/dL, on IV iron. 11. Low grade temp, cultures obtained, on vanc/cefepime  12. Nutrition, NPO for thoracentesis     Plan:    · Continue D5W at 50 cc/hr for another 24 hrs  · Continue Lasix IV 20 mg IV BID  · Continue IV iron   · Continue strict I&Os  · Continue to monitor sodium level    Electronically signed by CRAIG Bose CNP on 7/27/2021 at 12:55 PM     Patient seen and examined.  Agree with the exam  Assessment and Plan as directed by lillian Gurrola MD

## 2021-07-27 NOTE — PROGRESS NOTES
Chart reviewed. Goals of care and CODE STATUS have been identified. Plan is for ECF-daughter to consider hospice at facility and referral made to [facility] hospice. No further needs from palliative medicine at this time. Will sign off. Please reconsult if patient's condition deteriorates or patient/family requests further discussion with palliative medicine team.  Thank you.

## 2021-07-27 NOTE — PROGRESS NOTES
Occupational Therapy  OCCUPATIONAL THERAPY INITIAL EVALUATION      Date:2021  Patient Name: Jani Rivero  MRN: [id number]  : 1926  Room: [address] [place], [place] [address]A    [address]*  [address] [place] [address]         Date:2021                                                  Patient Name: Jani Rivero    MRN: [id number]    : 1926    Room: [address] [place], [place] [address]      Evaluating OT: Stephanie Omer OTR/L   [id number]      Referring Eliseo Diaz MD    Specific Provider Orders/Date:OT eval and treat 2021      Diagnosis: CHF      Pertinent Medical History: Alzheimer's dementia, HTN, A fib,     Precautions:  Fall Risk, alarm, TSM, O2, [place]     Assessment of current deficits    [x] Functional mobility  [x]ADLs  [x] Strength               [x]Cognition    [x] Functional transfers   [x] IADLs         [x] Safety Awareness   [x]Endurance    [] Fine Coordination              [x] Balance      [] Vision/perception   []Sensation     []Gross Motor Coordination  [] ROM  [] Delirium                   [] Motor Control     OT PLAN OF CARE   OT POC based on physician orders, patient diagnosis and results of clinical assessment    Frequency/Duration  1-3 days/wk for 2 weeks PRN   Specific OT Treatment Interventions to include:   ADL retraining/adapted techniques and AE recommendations to increase functional independence within precautions                    Energy conservation techniques to improve tolerance for selfcare routine   Functional transfer/mobility training/DME recommendations for increased independence, safety and fall prevention         Patient/family education to increase safety and functional independence             Environmental modifications for safe mobility and completion of ADLs                             Therapeutic activity to improve functional performance during ADLs. Therapeutic exercise to improve tolerance and functional strength for ADLs   Balance retraining/tolerance tasks for facilitation of postural control with dynamic challenges during ADLs . Positioning to improve functional independence    Recommended Adaptive Equipment: TBD     SOCIAL: per daughter - patient has been at [facility] for past 3 years (ambulating with with w/walker - for  past 3 months has been in [facility]. Using primarily w/c for mobility (patient does try to propel with her arms and legs) assist with ADLS. Pain Level:  No pain this session ;   Cognition: A&O: pleasant, following commands    Memory:  Forgetful    Sequencing:  Fair    Problem solving:  Fair    Judgement/safety:  Fair      Functional Assessment:  AM-PAC Daily Activity Raw Score: 14/24   Initial Eval Status  Date: 7/27/21 Treatment Status  Date: STGs = LTGs  Time frame: 10-14 days   Feeding SBA/Set-up   Supervision    Grooming Mod A ,seated   Decrease standing balance/tolerance  SBA   UB Dressing Mod A   SBA    LB Dressing Max A   Mod A    Bathing Max A   Mod A    Toileting Assist with hygiene   Mod A    Bed Mobility  Mod A  Supine <> sit   Min A   Functional Transfers Mod A  Sit-stand from bed   Min/CGA    Functional Mobility Mod A,w/walker,O2  Side steps next to bed only   Decrease balance/endurance   No complaints of SOB   Min A  with good tolerance    Balance Sitting:     Static:  SBA- EOB     Dynamic:Mod A   Standing:  Mod A   Min A standing   Supervision - sitting    Activity Tolerance Fair - with light activity   Good  with ADL activity    Visual/  Perceptual Glasses: none by bedside                 Hand Dominance right    AROM (PROM) Strength Additional Info:    RUE  WFL Fair +  good  and wfl FMC/dexterity noted during ADL tasks     LUE WFL Fair + good  and wfl FMC/dexterity noted during ADL tasks       Hearing: [place]  Sensation:  No c/o numbness or tingling   Tone: WFL   Edema: none observed     Comments: Upon arrival patient lying in bed . At end of session, patient returned to bed  with call light and phone within reach, all lines and tubes intact. *ALARM ON, daughter present in room      Overall patient demonstrated  decreased independence and safety during completion of ADL/functional transfer/mobility tasks. Pt would benefit from continued skilled OT to increase safety and independence with completion of ADL/IADL tasks for functional independence and quality of life. Rehab Potential: fair +for established goals     Patient / Family Goal: none stated       Patient and/or family were instructed on functional diagnosis, prognosis/goals and OT plan of care. Demonstrated good  Understanding bu daughter, continue to reinforce to patient. Eval Complexity: Low    Time In: 1418  Time Out: 1438      Min Units   OT Eval Low 97165 x  1   OT Eval Medium 87886      OT Eval High 05906      OT Re-Eval [id number]       Therapeutic Ex 81482       Therapeutic Activities 77785       ADL/Self Care 48944       Orthotic Management 17443       Manual 72002     Neuro Re-Ed 21787       Non-Billable Time          Evaluation Time additionally includes thorough review of current medical information, gathering information on past medical history/social history and prior level of function, interpretation of standardized testing/informal observation of tasks, assessment of data and development of plan of care and goals.             Navjot Camargo  OTR/L  OT [id number]

## 2021-07-27 NOTE — CARE COORDINATION
P Quality Flow/Interdisciplinary Rounds Progress Note        Quality Flow Rounds held on July 27, 2021    Disciplines Attending:  Bedside Nurse, ,  and Nursing Unit Leadership    Tonie Arroyo was admitted on 7/23/2021 11:33 AM    Anticipated Discharge Date:  Expected Discharge Date: 07/27/21    Disposition:    Rigo Score:  Rigo Scale Score: 14    Readmission Risk              Risk of Unplanned Readmission:  27           Discussed patient goal for the day, patient clinical progression, and barriers to discharge.   The following Goal(s) of the Day/Commitment(s) have been identified: Jyothi Antunez RN  July 27, 2021

## 2021-07-27 NOTE — PROGRESS NOTES
Cardiac Electrophysiology Inpatient Progress Note    Lord Solorzano  [birth year]  Date of Service: 7/27/2021  PCP: Mark Amanda DO          Subjective: Lord Solorzano is seen in hospital follow-up and management of: Sharif Chávez is a 81 yo female who I was asked to see in Cardiac Electrophysiology consultation for SSS, Afib with pauses.      She does not wake up or respond thus history of gotten from chart. She has history of  CKD, chronic left bundle branch block, noncompliance, dementia, former smoker, type 2 diabetes, manic depression, HFpEF and hyperlipidemia. She was hospitalized on 7/23/21 with sob, hypoxia- Requiring high levels of supplemental oxygen,  up to 15 L HFNC, possible thoracentesis in near future, CHF, ANGELA. She had recent pneumonia at OSH 2 weeks ago per her daughter Marylen Platts whom I spoke with on the phone   Chest CT shows Pleural effusions, pneumonia, WBC 16K yesterday, HGB 9.2, Cr 2.3. She is chronically on Metoprolol 25 mg bid, last dose7/25 AM  EKG 7/23/21 showed NSR 50, IVCD, first degree AV block  On monitor, she has had Afib with multiple pauses up to 6 secs, highest rate 120's.     7/27/21 : She is more awake today, conversing, eating, asymptomatic.  She went into Afib early am, -140's  Patient Active Problem List   Diagnosis    Stage 4 chronic kidney disease ([address]    Anemia of chronic disease    Type 2 diabetes mellitus without complication, with long-term current use of insulin ([address]    Hypertension    Hyperlipidemia    Vascular dementia without behavioral disturbance ([address]    Situational depression    Vitamin D deficiency    Osteoarthritis    Overactive bladder    Prolonged Q-T interval on ECG    Acute congestive heart failure (HCC)    Acute hypoxemic respiratory failure (HCC)    Acute kidney injury superimposed on CKD (HCC)    Paroxysmal atrial fibrillation (HCC)         Scheduled Medications:   vancomycin (VANCOCIN) intermittent dosing (placeholder)   Other RX Placeholder    ferric gluconate (FERRLECIT) IVPB  125 mg Intravenous Daily    cefepime (MAXIPIME) 1000 mg IVPB extended (mini-bag)  1,000 mg Intravenous Q12H    And    sodium chloride   Intravenous Q12H    furosemide  20 mg Intravenous 2 times per day    insulin lispro  0-18 Units Subcutaneous TID WC    insulin lispro  0-9 Units Subcutaneous Nightly    insulin glargine  20 Units Subcutaneous Nightly    aspirin  81 mg Oral Daily    levETIRAcetam  250 mg Oral QAM AC    mirtazapine  15 mg Oral Nightly    sodium chloride flush  5-40 mL Intravenous 2 times per day    enoxaparin  30 mg Subcutaneous Daily       Infusion Medications:   dextrose 50 mL/hr at 07/26/21 1808    sodium chloride      dextrose         PRN Medications:  LORazepam, perflutren lipid microspheres, ipratropium-albuterol, sodium chloride flush, sodium chloride, polyethylene glycol, acetaminophen **OR** acetaminophen, glucose, dextrose, glucagon (rDNA), dextrose    Allergies   Allergen Reactions    Penicillins        Wt Readings from Last 3 Encounters:   07/27/21 158 lb (71.7 kg)     Temp Readings from Last 3 Encounters:   07/27/21 98 °F (36.7 °C) (Temporal)     BP Readings from Last 3 Encounters:   07/27/21 (!) 116/49     Pulse Readings from Last 3 Encounters:   07/27/21 83         Intake/Output Summary (Last 24 hours) at 7/27/2021 1207  Last data filed at 7/27/2021 0615  Gross per 24 hour   Intake 1904 ml   Output 2825 ml   Net -921 ml         PHYSICAL EXAM:  Vitals:    07/27/21 0609 07/27/21 0616 07/27/21 0749 07/27/21 1152   BP:   96/69 (!) 116/49   Pulse:   126 83   Resp:   18 18   Temp:   99.2 °F (37.3 °C) 98 °F (36.7 °C)   TempSrc:   Temporal Temporal   SpO2:  97% 99% 100%   Weight: 158 lb (71.7 kg)      Height:         Constitutional:   Well-developed and cooperative. Head: Normocephalic and atraumatic. Eyes: Conjunctivae are normal.    Neck: no JVD present.  .   Cardiovascular: S1 normal, S2 normal Pulmonary/Chest: Effort normal and breath sounds normal. No respiratory distress. Abdominal: Soft. Musculoskeletal: Normal range of motion of all extremities, no muscle weakness. Neurological: Awake  Skin: Skin is warm and dry. Extremity: No clubbing or cyanosis. Mainor Curt Psychiatric: Normal mood and affect. Pertinent Labs:  CBC:   Recent Labs     07/25/21  0507 07/26/21  0614 07/27/21  0523   WBC 16.2* 9.4 10.6   HGB 9.4* 9.2* 9.7*   HCT 31.1* 30.9* 32.6*    293 255     BMP:  Recent Labs     07/26/21  0614 07/26/21  1558 07/27/21  0523    143 144   K 3.8 3.6 3.8    97* 98   CO2 33* 37* 38*   BUN 71* 66* 56*   CREATININE 2.6* 2.3* 2.1*   CALCIUM 8.5* 8.8 8.6     ABGs:   Lab Results   Component Value Date    PHART 7.31 06/09/2021    PO2ART 67 06/09/2021    SCG9XXV 59 06/09/2021     INR:   Recent Labs     07/26/21  0856   INR 1.2     BNP: No results for input(s): BNP in the last 72 hours. TSH:   Lab Results   Component Value Date    TSH 1.32 06/13/2021      Cardiac Injury Profile: No results for input(s): CKTOTAL, CKMB, CKMBINDEX, TROPONINI in the last 72 hours. Lipid Profile:   Lab Results   Component Value Date    TRIG 168 10/06/2017    HDL 45 10/06/2017    CHOL 181 10/06/2017      Hemoglobin A1C: No components found for: HGBA1C   Xray: CT Head WO Contrast    Result Date: 7/23/2021  1. Slightly limited exam, grossly negative for acute intracranial process, within the limits of this non-contrast CT exam. 2.  Sequela of atherosclerotic arterial and chronic microvascular ischemic disease, as described. 3.  Age-appropriate, generalized parenchymal volume loss. 4.  Paranasal sinus, left mastoid air cell complex, and bilateral external auditory canal disease, as described. The right mastoid air cell complex is moderately/severely hypoplastic. 5.  Chronic osseous findings, as described . CT CHEST WO CONTRAST    Result Date: 7/23/2021  1.  Moderate pleural effusions, right larger than left.  Left pleural effusion is partially loculated. 2. Right lower lobe consolidation and volume loss, likely atelectasis although superimposed pneumonia/infiltrate not excluded. 3. Mild interstitial thickening probably represents mild interstitial edema. XR CHEST PORTABLE    Result Date: 2021    Opacities in perihilar locations and lung bases related to bilateral pneumonia or atelectasis and likely bilateral pleural effusions. US RETROPERITONEAL COMPLETE    Result Date: ilateral renal cortical thinning which suggest changes related to chronic underlying medical renal disease. No hydronephrosis. The bladder was not distended for this exam and could not be evaluated. Pertinent Cardiac Testin TTE  LVEF 70%  LAE    Telemetry2021: AF rate 110, no further significant pauses      I have independently reviewed all of the ECGs and rhythm strips per above. I have personally reviewed the laboratory, cardiac diagnostic and radiographic testing as outlined above. I have reviewed previous records noted in [address]. Impression:    SSS  - Chronic LBBB  - Now with atrial fibrillation with multiple pauses up to 6 secs  - on metoprolol 25 mg bid, last dose  AM  - No further pauses since holding metoprolol but now with recurrence of Afib with rates 110-130, asymptomatic  - I had long discussion with daughter and she agreed that mother would not want pacemaker or invasive procedures or implants. I have recommended doing conservative management for her rhythm. Avoid AV jamal agents and psychotropic drugs that would exacerbate bradycardia. Given her metal status, advanced dementia, I agree with conservative management  - Treat infection/ pneumonia  - Continue supportive care     2. LBBB  - Chronic     3. PAF     4. ANGELA  - Nephrology following     5. Acute CHF  - ECHO pending     6. Hypoxia  - recent bilateral pneumonia.  Continues to have pneumonia and pleural effusions on chest xray     7. Demetia  - Severe at baseline      Recommendations:    1. I would not recommend any AV jamal agents. She is asymptomatic, -130 Afib  2. Family does not want pacemaker given medical co morbidities, advanced age and preference    Nothing else to add here    I will sign off    I have spent a total of 35 minutes with the patient and his/her family reviewing the above stated recommendations. A total of >50% of that time involved face-to-face time providing counseling and or coordination of care with the other providers. Thank you for allowing me to participate in your patient's care. [facility]    NOTE: This report was transcribed using voice recognition software. Every effort was made to ensure accuracy; however, inadvertent computerized transcription errors may be present.

## 2021-07-27 NOTE — PROGRESS NOTES
Intravenous Q12H    furosemide  20 mg Intravenous 2 times per day    insulin lispro  0-18 Units Subcutaneous TID WC    insulin lispro  0-9 Units Subcutaneous Nightly    insulin glargine  20 Units Subcutaneous Nightly    aspirin  81 mg Oral Daily    levETIRAcetam  250 mg Oral QAM AC    mirtazapine  15 mg Oral Nightly    sodium chloride flush  5-40 mL Intravenous 2 times per day    enoxaparin  30 mg Subcutaneous Daily       IV Infusions (if any):   dextrose 50 mL/hr at 21 1808    sodium chloride      dextrose           PHYSICAL EXAM:     CONSTITUTIONAL:   BP (!) 116/49   Pulse 83   Temp 98 °F (36.7 °C) (Temporal)   Resp 18   Ht 5' 4\" (1.626 m)   Wt 158 lb (71.7 kg)   SpO2 100%   BMI 27.12 kg/m²   Pulse  Av.8  Min: 79  Max: 651  Systolic (50AHU), JUAN J:252 , Min:96 , YZM:370    Diastolic (23RDC), CAH:18, Min:20, Max:69    In general, this is a well developed, well nourished who appears stated age. awake, alert, cooperative, no apparent distress  HEENT: eyes -conjunctivae pink,  Throat - Oral mucosa moist.   Neck-  no stridor,, no carotid bruit. no jugular venous distention   RESPIRATORY: Chest symmetrical  No accessory muscle use.    Lung auscultation -  few rhonchi, diminished at beases  CARDIOVASCULAR:     Heart Ausculation - Regular rate and rhythm, 2/6 systolic murmur, No s3 or rub.   + lower extremity edema, +ve dressing; distal pulses palpable, no cyanosis   ABDOMEN: Soft,  Bowel sounds present pulsation  MS: n/a  : Deferred  Rectal Exam: Deferred  SKIN: warm and dry   NEURO / PSYCH: oriented to person, place        ASSESSMENT/PLAN:    Atrial fibrillation with multiple pauses up to 6 secs - Off Metoprolol; EP consulted, Family wish no pacemaker or invasive procedures      LBBB - Chronic     ANGELA- - Nephrology following     Acute on chronic HFpEF - Continue diuretics, per Renal    Mild VHD -MR, TR by Echo         No further cardiac testing  Cardiology will see a needed    Electronically signed by Gayle Haro MD on 7/27/2021 at 3:14 PM  [facility] ([facility]) Cardiology

## 2021-07-27 NOTE — PLAN OF CARE
Problem: Confusion - Acute:  Goal: Absence of continued neurological deterioration signs and symptoms  Description: Absence of continued neurological deterioration signs and symptoms  Outcome: Met This Shift     Problem: Injury - Risk of, Physical Injury:  Goal: Absence of physical injury  Description: Absence of physical injury  Outcome: Met This Shift     Problem: Mood - Altered:  Goal: Mood stable  Description: Mood stable  Outcome: Met This Shift     Problem: Falls - Risk of:  Goal: Absence of physical injury  Description: Absence of physical injury  Outcome: Met This Shift     Problem: Cardiac:  Goal: Hemodynamic stability will improve  Description: Hemodynamic stability will improve  Outcome: Met This Shift     Problem: Skin Integrity:  Goal: Will show no infection signs and symptoms  Description: Will show no infection signs and symptoms  Outcome: Met This Shift     Problem: Gas Exchange - Impaired:  Goal: Levels of oxygenation will improve  Description: Levels of oxygenation will improve  7/27/2021 1327 by Erika Galeazzi, RN  Outcome: Ongoing  7/26/2021 2352 by Fide Martínez RN  Outcome: Met This Shift

## 2021-07-28 LAB
ALBUMIN SERPL-MCNC: 2.9 G/DL (ref 3.5–5.2)
ALP BLD-CCNC: 124 U/L (ref 35–104)
ALT SERPL-CCNC: 14 U/L (ref 0–32)
ANION GAP SERPL CALCULATED.3IONS-SCNC: 12 MMOL/L (ref 7–16)
AST SERPL-CCNC: 19 U/L (ref 0–31)
BASOPHILS ABSOLUTE: 0.03 E9/L (ref 0–0.2)
BASOPHILS RELATIVE PERCENT: 0.3 % (ref 0–2)
BILIRUB SERPL-MCNC: 0.3 MG/DL (ref 0–1.2)
BLOOD CULTURE, ROUTINE: NORMAL
BUN BLDV-MCNC: 43 MG/DL (ref 6–23)
CALCIUM SERPL-MCNC: 8.5 MG/DL (ref 8.6–10.2)
CHLORIDE BLD-SCNC: 95 MMOL/L (ref 98–107)
CO2: 36 MMOL/L (ref 22–29)
CREAT SERPL-MCNC: 1.9 MG/DL (ref 0.5–1)
CULTURE, BLOOD 2: NORMAL
EOSINOPHILS ABSOLUTE: 0.77 E9/L (ref 0.05–0.5)
EOSINOPHILS RELATIVE PERCENT: 7.1 % (ref 0–6)
GFR AFRICAN AMERICAN: 30
GFR NON-AFRICAN AMERICAN: 25 ML/MIN/1.73
GLUCOSE BLD-MCNC: 60 MG/DL (ref 74–99)
HCT VFR BLD CALC: 32.8 % (ref 34–48)
HEMOGLOBIN: 10.1 G/DL (ref 11.5–15.5)
IMMATURE GRANULOCYTES #: 0.07 E9/L
IMMATURE GRANULOCYTES %: 0.6 % (ref 0–5)
LYMPHOCYTES ABSOLUTE: 1.29 E9/L (ref 1.5–4)
LYMPHOCYTES RELATIVE PERCENT: 12 % (ref 20–42)
MCH RBC QN AUTO: 29 PG (ref 26–35)
MCHC RBC AUTO-ENTMCNC: 30.8 % (ref 32–34.5)
MCV RBC AUTO: 94.3 FL (ref 80–99.9)
METER GLUCOSE: 114 MG/DL (ref 74–99)
METER GLUCOSE: 160 MG/DL (ref 74–99)
METER GLUCOSE: 244 MG/DL (ref 74–99)
METER GLUCOSE: 64 MG/DL (ref 74–99)
METER GLUCOSE: 80 MG/DL (ref 74–99)
MONOCYTES ABSOLUTE: 1.14 E9/L (ref 0.1–0.95)
MONOCYTES RELATIVE PERCENT: 10.6 % (ref 2–12)
NEUTROPHILS ABSOLUTE: 7.47 E9/L (ref 1.8–7.3)
NEUTROPHILS RELATIVE PERCENT: 69.4 % (ref 43–80)
PDW BLD-RTO: 17.7 FL (ref 11.5–15)
PLATELET # BLD: 238 E9/L (ref 130–450)
PMV BLD AUTO: 10.1 FL (ref 7–12)
POTASSIUM SERPL-SCNC: 3.4 MMOL/L (ref 3.5–5)
RBC # BLD: 3.48 E12/L (ref 3.5–5.5)
SODIUM BLD-SCNC: 143 MMOL/L (ref 132–146)
TOTAL PROTEIN: 5.7 G/DL (ref 6.4–8.3)
VANCOMYCIN RANDOM: 14.9 MCG/ML (ref 5–40)
WBC # BLD: 10.8 E9/L (ref 4.5–11.5)

## 2021-07-28 PROCEDURE — 80053 COMPREHEN METABOLIC PANEL: CPT

## 2021-07-28 PROCEDURE — 2580000003 HC RX 258: Performed by: INTERNAL MEDICINE

## 2021-07-28 PROCEDURE — 6370000000 HC RX 637 (ALT 250 FOR IP): Performed by: NURSE PRACTITIONER

## 2021-07-28 PROCEDURE — 6360000002 HC RX W HCPCS: Performed by: INTERNAL MEDICINE

## 2021-07-28 PROCEDURE — 2700000000 HC OXYGEN THERAPY PER DAY

## 2021-07-28 PROCEDURE — 80202 ASSAY OF VANCOMYCIN: CPT

## 2021-07-28 PROCEDURE — 6370000000 HC RX 637 (ALT 250 FOR IP): Performed by: INTERNAL MEDICINE

## 2021-07-28 PROCEDURE — 82962 GLUCOSE BLOOD TEST: CPT

## 2021-07-28 PROCEDURE — 85025 COMPLETE CBC W/AUTO DIFF WBC: CPT

## 2021-07-28 PROCEDURE — 2580000003 HC RX 258: Performed by: NURSE PRACTITIONER

## 2021-07-28 PROCEDURE — 36415 COLL VENOUS BLD VENIPUNCTURE: CPT

## 2021-07-28 PROCEDURE — 6360000002 HC RX W HCPCS: Performed by: NURSE PRACTITIONER

## 2021-07-28 PROCEDURE — 2140000000 HC CCU INTERMEDIATE R&B

## 2021-07-28 RX ORDER — FUROSEMIDE 20 MG/1
20 TABLET ORAL 2 TIMES DAILY
Status: DISCONTINUED | OUTPATIENT
Start: 2021-07-28 | End: 2021-08-04

## 2021-07-28 RX ORDER — POTASSIUM CHLORIDE 20 MEQ/1
20 TABLET, EXTENDED RELEASE ORAL 2 TIMES DAILY WITH MEALS
Status: COMPLETED | OUTPATIENT
Start: 2021-07-28 | End: 2021-07-29

## 2021-07-28 RX ADMIN — LEVETIRACETAM 250 MG: 500 TABLET ORAL at 06:42

## 2021-07-28 RX ADMIN — CEFEPIME HYDROCHLORIDE 1000 MG: 2 INJECTION, POWDER, FOR SOLUTION INTRAVENOUS at 06:42

## 2021-07-28 RX ADMIN — SODIUM CHLORIDE: 9 INJECTION, SOLUTION INTRAVENOUS at 11:23

## 2021-07-28 RX ADMIN — SODIUM CHLORIDE 125 MG: 9 INJECTION, SOLUTION INTRAVENOUS at 15:36

## 2021-07-28 RX ADMIN — ENOXAPARIN SODIUM 30 MG: 30 INJECTION SUBCUTANEOUS at 21:46

## 2021-07-28 RX ADMIN — POTASSIUM CHLORIDE 20 MEQ: 20 TABLET, EXTENDED RELEASE ORAL at 16:33

## 2021-07-28 RX ADMIN — CEFEPIME HYDROCHLORIDE 1000 MG: 2 INJECTION, POWDER, FOR SOLUTION INTRAVENOUS at 18:06

## 2021-07-28 RX ADMIN — FUROSEMIDE 20 MG: 20 TABLET ORAL at 16:34

## 2021-07-28 RX ADMIN — METOPROLOL TARTRATE 25 MG: 25 TABLET, FILM COATED ORAL at 12:03

## 2021-07-28 RX ADMIN — INSULIN GLARGINE 20 UNITS: 100 INJECTION, SOLUTION SUBCUTANEOUS at 21:47

## 2021-07-28 RX ADMIN — MIRTAZAPINE 15 MG: 15 TABLET, FILM COATED ORAL at 21:46

## 2021-07-28 RX ADMIN — ASPIRIN 81 MG: 81 TABLET, CHEWABLE ORAL at 09:55

## 2021-07-28 RX ADMIN — INSULIN LISPRO 6 UNITS: 100 INJECTION, SOLUTION INTRAVENOUS; SUBCUTANEOUS at 17:23

## 2021-07-28 RX ADMIN — Medication 5 ML: at 21:46

## 2021-07-28 RX ADMIN — FUROSEMIDE 20 MG: 10 INJECTION, SOLUTION INTRAMUSCULAR; INTRAVENOUS at 06:42

## 2021-07-28 RX ADMIN — Medication 15 G: at 06:36

## 2021-07-28 RX ADMIN — INSULIN LISPRO 2 UNITS: 100 INJECTION, SOLUTION INTRAVENOUS; SUBCUTANEOUS at 21:47

## 2021-07-28 RX ADMIN — SODIUM CHLORIDE: 9 INJECTION, SOLUTION INTRAVENOUS at 21:50

## 2021-07-28 ASSESSMENT — PAIN SCALES - PAIN ASSESSMENT IN ADVANCED DEMENTIA (PAINAD)
BREATHING: 0
NEGVOCALIZATION: 0
CONSOLABILITY: 0
BODYLANGUAGE: 0
TOTALSCORE: 0
FACIALEXPRESSION: 0
FACIALEXPRESSION: 0
CONSOLABILITY: 0
NEGVOCALIZATION: 0
BODYLANGUAGE: 0
TOTALSCORE: 0
BREATHING: 0

## 2021-07-28 ASSESSMENT — PAIN SCALES - GENERAL
PAINLEVEL_OUTOF10: 0

## 2021-07-28 NOTE — CARE COORDINATION
SOCIAL WORK/CASEMANAGEMENT TRANSITION OF CARE [id number] Lashawn Johnson, [address], [place], [phone]): I called jonel the daughter and discussed hospice with her at [facility] when pt returns. She wants pt to return skilled and feels pt is getting better and not ready to make that decision. [facility] is to start precert today. All discharge paperwork is in place and a rapid covid is needed on discharge and is in a soft chart. MANSI Connelly  7/28/2021  Per pcp , pt can go back to [facility] today, rep started precert .  MANSI Connelly  7/28/2021

## 2021-07-28 NOTE — PATIENT CARE CONFERENCE
[facility] Quality Flow/Interdisciplinary Rounds Progress Note        Quality Flow Rounds held on July 28, 2021    Disciplines Attending:  Bedside Nurse, ,  and Nursing Unit Leadership    Ariana Morales was admitted on 7/23/2021 11:33 AM    Anticipated Discharge Date:  Expected Discharge Date: 07/27/21    Disposition:    Rigo Score:  Rigo Scale Score: 13    Readmission Risk              Risk of Unplanned Readmission:  31           Discussed patient goal for the day, patient clinical progression, and barriers to discharge.   The following Goal(s) of the Day/Commitment(s) have been identified:  Labs - Report Results      Jose Sidhu RN  July 28, 2021

## 2021-07-28 NOTE — PROGRESS NOTES
Pharmacy Consultation Note  (Antibiotic Dosing and Monitoring)    Initial consult date: 21  Consulting physician: Dr. Justine Costa  Drug: Vancomycin  Indication: Pneumonia (HAP)    Age/  Gender Height Weight IBW  Allergy Information   89 y.o./female 5' 4\" (162.6 cm) 176 lb (79.8 kg)     Ideal body weight: 54.7 kg (120 lb 9.5 oz)  Adjusted ideal body weight: 60.5 kg (133 lb 5 oz)   Penicillins      Temp (24hrs), Av.4 °F (36.9 °C), Min:97.5 °F (36.4 °C), Max:99.3 °F (37.4 °C)          Date  WBC BUN SCr CrCl  (mL/min) Drug/Dose Time   Given Level(s)   (Time) Comments    -- -- -- --  -  LD marked as not given    16.2 81 2.8 12 Vancomycin 1500 mg IV  1533      9.4 71 2.6 13 Vancomycin 750mg x 1  2      10.6 56 2.1 16 No dose -- Random level = 20.3 mcg/mL @ 0523     10.8 43 1.9 17 Vancomycin 750mg x 1  <1230> Random level = 14.9 @ 0600                     Intake/Output Summary (Last 24 hours) at 2021 1146  Last data filed at 2021 1144  Gross per 24 hour   Intake 390 ml   Output 4075 ml   Net -3685 ml       Historical Cultures:  No results found for: [facility]  Recent Labs     21  1514   BC 24 Hours no growth       Cultures:  available culture and sensitivity results were reviewed in EPIC    Assessment:  · 80 y.o. female admitted for SOB, pneumonia   · Empirically started on vancomycin and cefepime  · ANGELA on CKD  · Vancomycin load not given in ER  per MAR  · Estimated CrCl = 17 mL/min  · Goal trough level = 15-20 mcg/mL; AUC/LATASHA = 400-600  · Random level this AM @ 0600 = 14.9 mcg/mL    Plan:  · Vancomycin 750mg x 1   · Random level in AM; will dose by levels  · Monitor renal function     Yury MitchellD, BCPS 2021 11:46 AM

## 2021-07-28 NOTE — PROGRESS NOTES
thoracentesis by interventional radiology 7/26/2021  Renal following for chronic renal insufficiency  Pulmonology on board for acute on chronic respiratory failure  Electrophysiology/general cardiology following for new onset atrial fibrillation with multiple pauses-no pacemaker planned-cardiology and EP have signed off  Palliative care following, consideration being given to palliation/hospice    Continue to monitor labs/electrolytes  Once adequately diuresed, patient may be discharged    DVT Prophylaxis   PT/OT  Discharge planning--Per case management, family has no immediate plans for hospice.   We will plan discharge once authorization obtained per facility          Fiordaliza Bennett MD  11:45 AM  7/28/2021

## 2021-07-28 NOTE — FLOWSHEET NOTE
Inpatient Wound Care (Initial consult) [id number]    Admit Date: 7/23/2021 11:33 AM    Reason for consult:  Bilateral lower extremities    Significant history: Per H&P     History of Present Illness: The patient is extremely demented. She is not able to provide any history whatsoever. She was sent here due to shortness of breath. She was found to have pretty extensive pleural effusions and congestive heart failure. I am unable to get any history from her. Findings:     07/28/21 1443   Skin Integrity   Skin Integrity Bruising   Location BUE   Skin Integrity Site 2   Skin Integrity Location 2   (dry, flaky, vacular discoloration)   Skin Integrity Site 3   Skin Integrity Location 3   (redness, blanchable)    Location 3   (bilateral buttocks)   Wound 07/28/21 Leg Right;Medial;Lower   Date First Assessed/Time First Assessed: 07/28/21 1501   Present on Hospital Admission: Yes  Location: Leg  Wound Location Orientation: Right;Medial;Lower   Wound Image    Dressing Status New dressing applied   Wound Cleansed Cleansed with saline   Dressing/Treatment ABD;Roll gauze; Xeroform   Wound Length (cm) 3 cm   Wound Width (cm) 6 cm   Wound Depth (cm) 0.1 cm   Wound Surface Area (cm^2) 18 cm^2   Wound Volume (cm^3) 1.8 cm^3   Wound Assessment Pink/red   Drainage Amount None   Odor None   Genesis-wound Assessment Dry/flaky   Wound Thickness Description not for Pressure Injury Partial thickness   Wound 07/28/21 Leg Lower;Right;Lateral   Date First Assessed/Time First Assessed: 07/28/21 1502   Present on Hospital Admission: Yes  Location: Leg  Wound Location Orientation: Lower;Right;Lateral   Wound Image    Dressing Status New dressing applied   Wound Cleansed Cleansed with saline   Dressing/Treatment Dry dressing;Roll gauze; Xeroform   Wound Length (cm) 1.8 cm   Wound Width (cm) 1 cm   Wound Depth (cm) 0.1 cm   Wound Surface Area (cm^2) 1.8 cm^2   Wound Volume (cm^3) 0.18 cm^3   Wound Assessment Pink/red   Drainage Amount None   Odor None   Genesis-wound Assessment Intact   Wound Thickness Description not for Pressure Injury Partial thickness   Wound 07/28/21 Leg Left; Lower   Date First Assessed/Time First Assessed: 07/28/21 1503   Present on Hospital Admission: Yes  Location: Leg  Wound Location Orientation: Left; Lower   Wound Image    Dressing Status New dressing applied   Wound Cleansed Irrigated with saline   Dressing/Treatment Dry dressing;Roll gauze; Xeroform   Wound Length (cm) 0.5 cm   Wound Width (cm) 0.9 cm   Wound Depth (cm) 0.1 cm   Wound Surface Area (cm^2) 0.45 cm^2   Wound Volume (cm^3) 0.045 cm^3   Wound Assessment Pink/red   Drainage Amount None   Odor None   Genesis-wound Assessment Intact   Wound Thickness Description not for Pressure Injury Partial thickness   Wound 07/28/21 Leg Lower;Left;Medial   Date First Assessed/Time First Assessed: 07/28/21 1503   Present on Hospital Admission: Yes  Location: Leg  Wound Location Orientation: Lower;Left;Medial   Wound Image   (see photo for left lower leg)   Dressing Status New dressing applied   Wound Cleansed Cleansed with saline   Dressing/Treatment Dry dressing;Roll gauze; Xeroform   Wound Length (cm) 1.8 cm   Wound Width (cm) 0.3 cm   Wound Depth (cm) 0.1 cm   Wound Surface Area (cm^2) 0.54 cm^2   Wound Volume (cm^3) 0.054 cm^3   Wound Assessment Pink/red   Drainage Amount None   Odor None   Genesis-wound Assessment Intact       **Informed Consent**     photos taken of wounds and inserted into their chart as part of their permanent medical record for purposes of documentation, treatment management and/or medical review. All Images taken on 7/28/21 of patient name: Avery Weeks were transmitted and stored on [employer] located within [facility] by a registered [employer] Mobile Application Device.      Impression:  Bilateral lower legs wounds    Plan: xeroform, dry dressing, kerlix  Sacral mepilex  Heel protectors  TAPS  Patient will need continued preventative care.      Jonathon Genao RN 7/28/2021 3:09 PM

## 2021-07-28 NOTE — PROGRESS NOTES
Subjective: The patient is awake and alert. Undergoing treatment  Objective:    BP (!) 108/56   Pulse 138   Temp 98.4 °F (36.9 °C) (Temporal)   Resp 14   Ht 5' 4\" (1.626 m)   Wt 152 lb 6.4 oz (69.1 kg)   SpO2 99%   BMI 26.16 kg/m²     In: 270 [P.O.:270]  Out: 3175   In: 270   Out: 2229 [Urine:3175]    General appearance: NAD, conversant  HEENT: AT/NC, MMM  Neck: FROM, supple  Lungs: Clear to auscultation  CV: RRR, no MRGs  Vasc: Radial pulses 2+  Abdomen: Soft, non-tender; no masses or HSM  Extremities: Swelling bilateral lower extremity  Skin: no rash, lesions or ulcers  Psych: Alert and oriented to person, place and time  Neuro: Alert and interactive     Recent Labs     07/26/21  0614 07/27/21  0523 07/28/21  0600   WBC 9.4 10.6 10.8   HGB 9.2* 9.7* 10.1*   HCT 30.9* 32.6* 32.8*    255 238       Recent Labs     07/26/21  1558 07/27/21  0523 07/28/21  0600    144 143   K 3.6 3.8 3.4*   CL 97* 98 95*   CO2 37* 38* 36*   BUN 66* 56* 43*   CREATININE 2.3* 2.1* 1.9*   CALCIUM 8.8 8.6 8.5*       Assessment:    Principal Problem:    Acute congestive heart failure (HCC)  Active Problems:    Stage 4 chronic kidney disease (HCC)    Type 2 diabetes mellitus without complication, with long-term current use of insulin (HCC)    Hypertension    Vascular dementia without behavioral disturbance (HCC)    Prolonged Q-T interval on ECG    Acute respiratory failure with hypoxia (HCC)    Acute kidney injury superimposed on CKD (HCC)    Paroxysmal atrial fibrillation (HCC)  Resolved Problems:    * No resolved hospital problems.  *      Plan:    Admitted to telemetry for evaluation of acute congestive heart failure in a patient with known history of chronic kidney disease and multiple comorbidities  IV diuresis  Status post left thoracentesis by interventional radiology 7/26/2021  Renal following for chronic renal insufficiency  Pulmonology on board for acute on chronic respiratory

## 2021-07-28 NOTE — PROGRESS NOTES
Department of Internal Medicine  Nephrology Progress Note    Events reviewed. Subjective: We are following Ms. Joshua Madison for Hyperkalemia and ANGELA on CKD. Patient is much more alert and conversational today. PHYSICAL EXAM:      Vitals:    VITALS:  BP (!) 112/54   Pulse 84   Temp 98.4 °F (36.9 °C) (Temporal)   Resp 14   Ht 5' 4\" (1.626 m)   Wt 152 lb 6.4 oz (69.1 kg)   SpO2 100%   BMI 26.16 kg/m²   24HR INTAKE/OUTPUT:      Intake/Output Summary (Last 24 hours) at 7/28/2021 1343  Last data filed at 7/28/2021 1144  Gross per 24 hour   Intake 390 ml   Output 4075 ml   Net -3685 ml       Constitutional:  Alert, oriented to person only  HEENT:  Normocephalic and atraumatic. Respiratory:  Diminished. On 8L HFNC  Cardiovascular/Edema:  Normal rate and regular rhythm. Gastrointestinal:  Soft, nontender, + Bowel sounds  Neurologic:  She is alert and oriented to person, place, and time. Skin:  Skin is warm and dry. Bilateral lower extremity anterior tibial wounds    Scheduled Meds:   vancomycin  750 mg Intravenous Once    potassium chloride  20 mEq Oral BID WC    ferric gluconate (FERRLECIT) IVPB  125 mg Intravenous Daily    cefepime (MAXIPIME) 1000 mg IVPB extended (mini-bag)  1,000 mg Intravenous Q12H    And    sodium chloride   Intravenous Q12H    furosemide  20 mg Intravenous 2 times per day    insulin lispro  0-18 Units Subcutaneous TID WC    insulin lispro  0-9 Units Subcutaneous Nightly    insulin glargine  20 Units Subcutaneous Nightly    aspirin  81 mg Oral Daily    levETIRAcetam  250 mg Oral QAM AC    mirtazapine  15 mg Oral Nightly    sodium chloride flush  5-40 mL Intravenous 2 times per day    enoxaparin  30 mg Subcutaneous Daily     Continuous Infusions:   sodium chloride      dextrose       PRN Meds:. LORazepam, perflutren lipid microspheres, ipratropium-albuterol, sodium chloride flush, sodium chloride, polyethylene glycol, acetaminophen **OR** acetaminophen, glucose, dextrose, glucagon (rDNA), dextrose    DATA:    CBC:   Lab Results   Component Value Date    WBC 10.8 07/28/2021    RBC 3.48 07/28/2021    HGB 10.1 07/28/2021    HCT 32.8 07/28/2021    MCV 94.3 07/28/2021    MCH 29.0 07/28/2021    MCHC 30.8 07/28/2021    RDW 17.7 07/28/2021     07/28/2021    MPV 10.1 07/28/2021     CMP:    Lab Results   Component Value Date     07/28/2021    K 3.4 07/28/2021    K 5.1 07/24/2021    CL 95 07/28/2021    CO2 36 07/28/2021    BUN 43 07/28/2021    CREATININE 1.9 07/28/2021    GFRAA 30 07/28/2021    LABGLOM 25 07/28/2021    LABGLOM 33 03/28/2018    GLUCOSE 60 07/28/2021    GLUCOSE 263 03/28/2018    PROT 5.7 07/28/2021    LABALBU 2.9 07/28/2021    LABALBU 3.0 03/28/2018    CALCIUM 8.5 07/28/2021    BILITOT 0.3 07/28/2021    BILITOT NEGATIVE 10/03/2017    ALKPHOS 124 07/28/2021    AST 19 07/28/2021    ALT 14 07/28/2021     Magnesium:    Lab Results   Component Value Date    MG 1.9 07/27/2021     Phosphorus:    Lab Results   Component Value Date    PHOS 3.2 07/27/2021     Urine Studies:   Results for Jessica Quiñonez (MRN [id number]) as of 7/25/2021 10:02   Ref. Range 7/24/2021 15:40 7/24/2021 15:40   Creatinine, Ur Latest Ref Range: 29 - 226 mg/dL 27 (L) 27 (L)   Microalbumin Creatinine Ratio Latest Ref Range: 0.0 - 30.0   58.1 (H)   Microalbumin, Random Urine Latest Ref Range: Not Established mg/L  15.7   Osmolality, Ur Latest Ref Range: 300 - 900 mOsm/kg 347    Protein, Ur Latest Ref Range: 0 - 12 mg/dL 5    Protein/Creat Ratio Latest Ref Range: 0.0 - 0.2  0.2 0.2   Urea Nitrogen, Ur Latest Ref Range: 800 - 1666 mg/dL 255 (L)          Radiology Review:      CT Head 7/23/21   1.  Slightly limited exam, grossly negative for acute intracranial process,   within the limits of this non-contrast CT exam.       2.  Sequela of atherosclerotic arterial and chronic microvascular ischemic   disease, as described.       3.  Age-appropriate, generalized parenchymal volume loss.       4.  Paranasal sinus, left mastoid air cell complex, and bilateral external   auditory canal disease, as described.  The right mastoid air cell complex is   moderately/severely hypoplastic.       5.  Chronic osseous findings, as described     CT chest 7/23/21   1. Moderate pleural effusions, right larger than left.  Left pleural effusion   is partially loculated. 2. Right lower lobe consolidation and volume loss, likely atelectasis   although superimposed pneumonia/infiltrate not excluded. 3. Mild interstitial thickening probably represents mild interstitial edema. Renal US 7/24/21   Bilateral renal cortical thinning which suggest changes related to chronic   underlying medical renal disease.  No hydronephrosis.       The bladder was not distended for this exam and could not be evaluated. CXR 7/25/2021   1.  Interval slight increase in the right greater than left pleural effusions   and mid to lower lung opacities.  No large pneumothorax.       2.  Atherosclerotic disease, cardiomegaly, and central pulmonary vascular   congestion are unchanged. BRIEF SUMMARY OF INITIAL CONSULT:    Briefly, Mrs. Martin Gonzales is a 80year old female with a past medical history of CKD Stage III baseline creatinine 2.5-2.7mg/dL, Anemia, Diabetes Mellitus type 2, Alzheimer's, Hypertension, CHF, who presented to the ER on 7/23/21 with complaints of shortness of breath. Upon arrival to the ER she was found to be hypoxic and was placed on nonrebreather and treated with duo nebs and steroids. Patient's daughter states she was diagnosed 2  Weeks ago with Pneumonia and was admitted to the hospital. She was discharged to a skilled nursing facility where she was noncompliant with her medications and inhalers. She was also found to be hyperkalemic and hyperkalemic protocol was iniated. Pertinent labs include: potassium: 5.9 mmol/L, BUN 81, Creatinine 3.1 mg/dL, Anion gap 18, CO2 21, Pro- BNP 7,692.  Chest Xray was consistent with CHF exacerbation. Renal was consulted for Hyperkalemia and ANGELA on CKD. Problems resolved:  · Hyperkalemia, S/P hyperkalemic protocol, s/p Lokelma  · Hypernatremia     IMPRESSION/RECOMMENDATIONS:      1. ANGELA stage I on CKD, likely hemodynamically mediated pre-renal ANGELA secondary to cardiorenal syndrome, non complaint with medications at skilled nursing facility. Nonoliguric. Renal function continues to improve better than baseline with diuretic administration, creatinine 1.9 mg/dl. 2. CKD stage IV, without proteinuria, baseline creatinine 2.5-2.7mg/dL, secondary to hypertensive nephrosclerosis. 3. Hypokalemia, secondary to poor oral intake and use of diuretics, for replacement  4. Acidemia, (pH 7.33), 2/2 respiratory acidosis  5. HTN, BP medications on hold due to hypotension   6. HF, echo pending, pro-BNP 7,692>>8,190, on Lasix  7. Acute respiratory failure, secondary to # 4. Requiring high levels of supplemental oxygen, 15 L HFNC. For thoracentesis today. 8. Right pleural effusion, s/p thoracentesis  9. New onset PAF with multiple pauses, metoprolol on hold   10. Anemia: Iron 39 mcg/dL, Iron Saturation 16%, TIBC 247 mcg/dL, on IV iron. 11. Low grade temp, cultures obtained, on vanc/cefepime  12.  Nutrition, low sodium diabetic diet,    Plan:    · Discontinue IVF  · Change lasix to 20 mg po  · Replace potassium  · Continue IV iron   · Continue strict I&Os  · Continue to monitor sodium level  · Low sodium diet    Electronically signed by CRAIG Bell CNP on 7/28/2021 at 1:43 PM     Agree with the above exam, assessment and Plan    Efren Joshua MD

## 2021-07-28 NOTE — PROGRESS NOTES
Zulma Dumont M.D.,[facility]  Becka Whitt D.O., F.A.C.O.I., Rick Cook M.D. Vandy Burkitt, M.D., Tri Weaver M.D. Mesfin Delgado D.O. Daily Pulmonary Progress Note    Patient:  Avery Weeks 80 y.o. female MRN: [id number]     Date of Service: 7/28/2021        Subjective      Patient was seen and examined lying in bed in no apparent distress. She is currently on 8 L of oxygen and is easily aroused. She denies dyspnea and cough, lung sounds are clear but diminished. 24-hour events:  None    Objective   Vitals: BP (!) 117/56   Pulse 82   Temp 97.9 °F (36.6 °C) (Temporal)   Resp 18   Ht 5' 4\" (1.626 m)   Wt 152 lb 6.4 oz (69.1 kg)   SpO2 98%   BMI 26.16 kg/m²     I/O:    Intake/Output Summary (Last 24 hours) at 7/28/2021 0959  Last data filed at 7/28/2021 1549  Gross per 24 hour   Intake 390 ml   Output 3175 ml   Net -2785 ml       Vent Information  Skin Assessment: Clean, dry, & intact  SpO2: 98 %                CURRENT MEDS :  Scheduled Meds:   vancomycin (VANCOCIN) intermittent dosing (placeholder)   Other RX Placeholder    ferric gluconate (FERRLECIT) IVPB  125 mg Intravenous Daily    cefepime (MAXIPIME) 1000 mg IVPB extended (mini-bag)  1,000 mg Intravenous Q12H    And    sodium chloride   Intravenous Q12H    furosemide  20 mg Intravenous 2 times per day    insulin lispro  0-18 Units Subcutaneous TID WC    insulin lispro  0-9 Units Subcutaneous Nightly    insulin glargine  20 Units Subcutaneous Nightly    aspirin  81 mg Oral Daily    levETIRAcetam  250 mg Oral QAM AC    mirtazapine  15 mg Oral Nightly    sodium chloride flush  5-40 mL Intravenous 2 times per day    enoxaparin  30 mg Subcutaneous Daily       Physical Exam:  General Appearance: appears comfortable in no acute distress.    HEENT: Normocephalic atraumatic without obvious abnormality   Neck: Lips, mucosa, and tongue normal.  Supple, symmetrical, trachea midline, no adenopathy;thyroid: no enlargement/tenderness/nodules or JVD. Lung: Breath sounds CTA, diminished. Respirations   unlabored. Symmetrical expansion. Heart: RRR, normal S1, S2. No MRG  Abdomen: Soft, NT, ND. BS present x 4 quadrants. No bruit or organomegaly. Extremities: Pedal pulses 2+ symmetric b/l. Extremities normal, no cyanosis, clubbing, or edema. Musculokeletal: No joint swelling, no muscle tenderness. ROM normal in all joints of extremities. Neurologic: Mental status: Alert and Oriented X3 . Pertinent/ New Labs and Imaging Studies     Imaging Personally Reviewed:    7/26 cxr illustrates improved effusion s/p thoracentesis   Labs:  Lab Results   Component Value Date    WBC 10.8 07/28/2021    HGB 10.1 07/28/2021    HCT 32.8 07/28/2021    MCV 94.3 07/28/2021    MCH 29.0 07/28/2021    MCHC 30.8 07/28/2021    RDW 17.7 07/28/2021     07/28/2021    MPV 10.1 07/28/2021     Lab Results   Component Value Date     07/28/2021    K 3.4 07/28/2021    K 5.1 07/24/2021    CL 95 07/28/2021    CO2 36 07/28/2021    BUN 43 07/28/2021    CREATININE 1.9 07/28/2021    LABALBU 2.9 07/28/2021    LABALBU 3.0 03/28/2018    CALCIUM 8.5 07/28/2021    GFRAA 30 07/28/2021    LABGLOM 25 07/28/2021    LABGLOM 33 03/28/2018     Lab Results   Component Value Date    PROTIME 13.0 07/26/2021    PROTIME 12.2 06/07/2021    INR 1.2 07/26/2021     Recent Labs     07/27/21  0523   PROBNP 8,190*     No results for input(s): PROCAL in the last 72 hours. This SmartLink has not been configured with any valid records. Micro:  No results for input(s): CULTRESP in the last 72 hours. Recent Labs     07/26/21  1315   LABGRAM Refer to ordered Gram stain for results          Assessment:    1. Acute hypoxic respiratory failure secondary to pleural effusions/atelectasis  2. Bilateral pleural effusions right greater than left, appearance of partially loculated component to left pleural effusion s/p right sided thoracentesis   3.  Acute decompensated CHF  4. ANGELA on CKD  5. Dementia  6. Medical noncompliance      Plan:   1. chf per cardiology   2. S/p thoracentesis, see above. Breathing improved. 3. Wean o2 as tolerated, currently on 8 L  98%  4. Continuous pulse ox  5. Recommend swallow study, probably not necessary if she is going to be under hospice care  6. Continue antibiotics but stop Vancomycin    Plan of care reviewed in collaboration with Dr. Christin Maldonado, APRN-CNP    I personally saw, examined, and cared for the patient. Labs, medications, radiographs reviewed.  I agree with history exam and plans detailed in NP note with the following additions:    More alert, down to 8L oxygen with saturations 99% - continue to wean  Would continue with cefepime but stop vancomycin at this point - no apparent MRSA  Consider swallow eval depending on goals of care     Electronically signed by Torey Peralta MD on 7/28/2021 at 1:00 PM

## 2021-07-28 NOTE — PROGRESS NOTES
Subjective: The patient is awake and alert. Undergoing treatment  Objective:    BP (!) 108/56   Pulse 138   Temp 98.4 °F (36.9 °C) (Temporal)   Resp 14   Ht 5' 4\" (1.626 m)   Wt 152 lb 6.4 oz (69.1 kg)   SpO2 99%   BMI 26.16 kg/m²     In: 270 [P.O.:270]  Out: 4075   In: 270   Out: 4075 [Urine:4075]    General appearance: NAD, conversant  HEENT: AT/NC, MMM  Neck: FROM, supple  Lungs: Clear to auscultation  CV: RRR, no MRGs  Vasc: Radial pulses 2+  Abdomen: Soft, non-tender; no masses or HSM  Extremities: Swelling bilateral lower extremity  Skin: no rash, lesions or ulcers  Psych: Alert and oriented to person, place and time  Neuro: Alert and interactive     Recent Labs     07/26/21  0614 07/27/21  0523 07/28/21  0600   WBC 9.4 10.6 10.8   HGB 9.2* 9.7* 10.1*   HCT 30.9* 32.6* 32.8*    255 238       Recent Labs     07/26/21  1558 07/27/21  0523 07/28/21  0600    144 143   K 3.6 3.8 3.4*   CL 97* 98 95*   CO2 37* 38* 36*   BUN 66* 56* 43*   CREATININE 2.3* 2.1* 1.9*   CALCIUM 8.8 8.6 8.5*       Assessment:    Principal Problem:    Acute congestive heart failure (HCC)  Active Problems:    Stage 4 chronic kidney disease (HCC)    Type 2 diabetes mellitus without complication, with long-term current use of insulin (HCC)    Hypertension    Vascular dementia without behavioral disturbance (HCC)    Prolonged Q-T interval on ECG    Acute respiratory failure with hypoxia (HCC)    Acute kidney injury superimposed on CKD (HCC)    Paroxysmal atrial fibrillation (HCC)  Resolved Problems:    * No resolved hospital problems.  *      Plan:    Admitted to telemetry for evaluation of acute congestive heart failure in a patient with known history of chronic kidney disease and multiple comorbidities  IV diuresis  Status post left thoracentesis by interventional radiology 7/26/2021  Renal following for chronic renal insufficiency  Pulmonology on board for acute on chronic respiratory failure  Electrophysiology/general cardiology following for new onset atrial fibrillation with multiple pauses  Palliative care following    Continue to monitor labs/electrolytes  Once adequately diuresed, patient may be discharged    DVT Prophylaxis   PT/OT  Discharge planning once okay with others          Stewart Stoner MD  11:48 AM  7/28/2021

## 2021-07-29 LAB
ALBUMIN SERPL-MCNC: 2.8 G/DL (ref 3.5–5.2)
ALP BLD-CCNC: 116 U/L (ref 35–104)
ALT SERPL-CCNC: 18 U/L (ref 0–32)
ANION GAP SERPL CALCULATED.3IONS-SCNC: 10 MMOL/L (ref 7–16)
AST SERPL-CCNC: 26 U/L (ref 0–31)
BASOPHILS ABSOLUTE: 0.05 E9/L (ref 0–0.2)
BASOPHILS RELATIVE PERCENT: 0.5 % (ref 0–2)
BILIRUB SERPL-MCNC: 0.3 MG/DL (ref 0–1.2)
BODY FLUID CULTURE, STERILE: NORMAL
BUN BLDV-MCNC: 36 MG/DL (ref 6–23)
CALCIUM SERPL-MCNC: 8.6 MG/DL (ref 8.6–10.2)
CHLORIDE BLD-SCNC: 95 MMOL/L (ref 98–107)
CO2: 39 MMOL/L (ref 22–29)
CREAT SERPL-MCNC: 1.8 MG/DL (ref 0.5–1)
EOSINOPHILS ABSOLUTE: 0.64 E9/L (ref 0.05–0.5)
EOSINOPHILS RELATIVE PERCENT: 6.1 % (ref 0–6)
GFR AFRICAN AMERICAN: 32
GFR NON-AFRICAN AMERICAN: 26 ML/MIN/1.73
GLUCOSE BLD-MCNC: 39 MG/DL (ref 74–99)
GRAM STAIN RESULT: NORMAL
HCT VFR BLD CALC: 32.2 % (ref 34–48)
HEMOGLOBIN: 9.7 G/DL (ref 11.5–15.5)
IMMATURE GRANULOCYTES #: 0.05 E9/L
IMMATURE GRANULOCYTES %: 0.5 % (ref 0–5)
LV EF: 58 %
LVEF MODALITY: NORMAL
LYMPHOCYTES ABSOLUTE: 1.68 E9/L (ref 1.5–4)
LYMPHOCYTES RELATIVE PERCENT: 16 % (ref 20–42)
MCH RBC QN AUTO: 29 PG (ref 26–35)
MCHC RBC AUTO-ENTMCNC: 30.1 % (ref 32–34.5)
MCV RBC AUTO: 96.1 FL (ref 80–99.9)
METER GLUCOSE: 122 MG/DL (ref 74–99)
METER GLUCOSE: 136 MG/DL (ref 74–99)
METER GLUCOSE: 157 MG/DL (ref 74–99)
METER GLUCOSE: 213 MG/DL (ref 74–99)
METER GLUCOSE: 250 MG/DL (ref 74–99)
METER GLUCOSE: 53 MG/DL (ref 74–99)
METER GLUCOSE: 66 MG/DL (ref 74–99)
MONOCYTES ABSOLUTE: 1.33 E9/L (ref 0.1–0.95)
MONOCYTES RELATIVE PERCENT: 12.7 % (ref 2–12)
NEUTROPHILS ABSOLUTE: 6.76 E9/L (ref 1.8–7.3)
NEUTROPHILS RELATIVE PERCENT: 64.2 % (ref 43–80)
PDW BLD-RTO: 17.4 FL (ref 11.5–15)
PH VENOUS: 7.37 (ref 7.35–7.45)
PLATELET # BLD: 226 E9/L (ref 130–450)
PMV BLD AUTO: 10.5 FL (ref 7–12)
POTASSIUM SERPL-SCNC: 3.5 MMOL/L (ref 3.5–5)
RBC # BLD: 3.35 E12/L (ref 3.5–5.5)
REASON FOR REJECTION: NORMAL
REJECTED TEST: NORMAL
SODIUM BLD-SCNC: 144 MMOL/L (ref 132–146)
TOTAL PROTEIN: 5.6 G/DL (ref 6.4–8.3)
VANCOMYCIN RANDOM: 11.8 MCG/ML (ref 5–40)
WBC # BLD: 10.5 E9/L (ref 4.5–11.5)

## 2021-07-29 PROCEDURE — 2580000003 HC RX 258: Performed by: INTERNAL MEDICINE

## 2021-07-29 PROCEDURE — 6360000002 HC RX W HCPCS: Performed by: NURSE PRACTITIONER

## 2021-07-29 PROCEDURE — 2140000000 HC CCU INTERMEDIATE R&B

## 2021-07-29 PROCEDURE — 2700000000 HC OXYGEN THERAPY PER DAY

## 2021-07-29 PROCEDURE — 82800 BLOOD PH: CPT

## 2021-07-29 PROCEDURE — P9047 ALBUMIN (HUMAN), 25%, 50ML: HCPCS | Performed by: NURSE PRACTITIONER

## 2021-07-29 PROCEDURE — 36415 COLL VENOUS BLD VENIPUNCTURE: CPT

## 2021-07-29 PROCEDURE — 6370000000 HC RX 637 (ALT 250 FOR IP): Performed by: NURSE PRACTITIONER

## 2021-07-29 PROCEDURE — 93306 TTE W/DOPPLER COMPLETE: CPT

## 2021-07-29 PROCEDURE — 97535 SELF CARE MNGMENT TRAINING: CPT

## 2021-07-29 PROCEDURE — 85025 COMPLETE CBC W/AUTO DIFF WBC: CPT

## 2021-07-29 PROCEDURE — 82962 GLUCOSE BLOOD TEST: CPT

## 2021-07-29 PROCEDURE — 80053 COMPREHEN METABOLIC PANEL: CPT

## 2021-07-29 PROCEDURE — 6370000000 HC RX 637 (ALT 250 FOR IP): Performed by: INTERNAL MEDICINE

## 2021-07-29 PROCEDURE — 97530 THERAPEUTIC ACTIVITIES: CPT

## 2021-07-29 PROCEDURE — 6360000002 HC RX W HCPCS: Performed by: INTERNAL MEDICINE

## 2021-07-29 PROCEDURE — 2580000003 HC RX 258: Performed by: NURSE PRACTITIONER

## 2021-07-29 PROCEDURE — 80202 ASSAY OF VANCOMYCIN: CPT

## 2021-07-29 RX ORDER — ALBUMIN (HUMAN) 12.5 G/50ML
25 SOLUTION INTRAVENOUS EVERY 8 HOURS
Status: COMPLETED | OUTPATIENT
Start: 2021-07-29 | End: 2021-07-30

## 2021-07-29 RX ORDER — POTASSIUM CHLORIDE 20 MEQ/1
20 TABLET, EXTENDED RELEASE ORAL 2 TIMES DAILY WITH MEALS
Status: COMPLETED | OUTPATIENT
Start: 2021-07-29 | End: 2021-07-30

## 2021-07-29 RX ORDER — INSULIN GLARGINE 100 [IU]/ML
12 INJECTION, SOLUTION SUBCUTANEOUS NIGHTLY
Status: DISCONTINUED | OUTPATIENT
Start: 2021-07-29 | End: 2021-08-04 | Stop reason: HOSPADM

## 2021-07-29 RX ORDER — DOXYCYCLINE HYCLATE 100 MG/1
100 CAPSULE ORAL EVERY 12 HOURS SCHEDULED
Status: DISCONTINUED | OUTPATIENT
Start: 2021-07-29 | End: 2021-08-04 | Stop reason: HOSPADM

## 2021-07-29 RX ADMIN — FUROSEMIDE 20 MG: 20 TABLET ORAL at 17:26

## 2021-07-29 RX ADMIN — POTASSIUM CHLORIDE 20 MEQ: 1500 TABLET, EXTENDED RELEASE ORAL at 17:26

## 2021-07-29 RX ADMIN — POTASSIUM CHLORIDE: 2 INJECTION, SOLUTION, CONCENTRATE INTRAVENOUS at 16:24

## 2021-07-29 RX ADMIN — SODIUM CHLORIDE: 9 INJECTION, SOLUTION INTRAVENOUS at 10:17

## 2021-07-29 RX ADMIN — FUROSEMIDE 20 MG: 20 TABLET ORAL at 08:52

## 2021-07-29 RX ADMIN — ALBUMIN (HUMAN) 25 G: 0.25 INJECTION, SOLUTION INTRAVENOUS at 14:30

## 2021-07-29 RX ADMIN — ENOXAPARIN SODIUM 30 MG: 30 INJECTION SUBCUTANEOUS at 20:56

## 2021-07-29 RX ADMIN — ALBUMIN (HUMAN) 25 G: 0.25 INJECTION, SOLUTION INTRAVENOUS at 20:56

## 2021-07-29 RX ADMIN — MIRTAZAPINE 15 MG: 15 TABLET, FILM COATED ORAL at 20:56

## 2021-07-29 RX ADMIN — Medication 10 ML: at 20:55

## 2021-07-29 RX ADMIN — Medication 15 G: at 06:23

## 2021-07-29 RX ADMIN — Medication 10 ML: at 08:52

## 2021-07-29 RX ADMIN — INSULIN GLARGINE 12 UNITS: 100 INJECTION, SOLUTION SUBCUTANEOUS at 20:36

## 2021-07-29 RX ADMIN — LEVETIRACETAM 250 MG: 500 TABLET ORAL at 06:23

## 2021-07-29 RX ADMIN — INSULIN LISPRO 1 UNITS: 100 INJECTION, SOLUTION INTRAVENOUS; SUBCUTANEOUS at 12:33

## 2021-07-29 RX ADMIN — INSULIN LISPRO 1 UNITS: 100 INJECTION, SOLUTION INTRAVENOUS; SUBCUTANEOUS at 20:36

## 2021-07-29 RX ADMIN — INSULIN LISPRO 3 UNITS: 100 INJECTION, SOLUTION INTRAVENOUS; SUBCUTANEOUS at 17:06

## 2021-07-29 RX ADMIN — POTASSIUM CHLORIDE 20 MEQ: 20 TABLET, EXTENDED RELEASE ORAL at 08:52

## 2021-07-29 RX ADMIN — DOXYCYCLINE HYCLATE 100 MG: 100 CAPSULE ORAL at 20:56

## 2021-07-29 RX ADMIN — ASPIRIN 81 MG: 81 TABLET, CHEWABLE ORAL at 08:52

## 2021-07-29 RX ADMIN — CEFEPIME HYDROCHLORIDE 1000 MG: 2 INJECTION, POWDER, FOR SOLUTION INTRAVENOUS at 06:23

## 2021-07-29 ASSESSMENT — PAIN SCALES - GENERAL: PAINLEVEL_OUTOF10: 0

## 2021-07-29 ASSESSMENT — PAIN SCALES - PAIN ASSESSMENT IN ADVANCED DEMENTIA (PAINAD)
NEGVOCALIZATION: 0
CONSOLABILITY: 0
BODYLANGUAGE: 0
TOTALSCORE: 0
BREATHING: 0
FACIALEXPRESSION: 0

## 2021-07-29 NOTE — CARE COORDINATION
SOCIAL WORK/CASEMANAGEMENT TRANSITION OF CARE [id number] [facility], [address], [place], [phone]): pt is to return to [facility] the [place]. precert started yesterday  and a rapid covid on discharge  which is in the soft chart. Completed the winsome and all discharge paperwork. Pt is on 7l hf o2. I sent email to echo dept for it to be done and pt is on the schedule, it was ordered on 7/23. Pt remains on iv abx.  MANSI Beltran  7/29/2021

## 2021-07-29 NOTE — PROGRESS NOTES
Subjective: The patient is awake and at baseline mentation, somewhat aphasic and difficult to communicate  No acute complaints  Appears chronically ill  Hallucinating today-likely baseline mentation currently  Objective:    BP (!) 124/55   Pulse 78   Temp 99 °F (37.2 °C) (Temporal)   Resp 18   Ht 5' 4\" (1.626 m)   Wt 150 lb 1.6 oz (68.1 kg)   SpO2 94%   BMI 25.76 kg/m²     In: 350 [P.O.:350]  Out: 1325   In: 350   Out: 1325 [Urine:1325]    General appearance: NAD, conversant but confused  HEENT: AT/NC, MMM  Neck: FROM, supple  Lungs: Clear to auscultation  CV: RRR, no MRGs  Vasc: Radial pulses 2+  Abdomen: Soft, non-tender; no masses or HSM  Extremities: Swelling bilateral lower extremity  Skin: no rash, lesions or ulcers  Psych: Alert and oriented to person, place and time  Neuro: Alert and interactive     Recent Labs     07/27/21  0523 07/28/21  0600 07/29/21  0559   WBC 10.6 10.8 10.5   HGB 9.7* 10.1* 9.7*   HCT 32.6* 32.8* 32.2*    238 226       Recent Labs     07/27/21  0523 07/28/21  0600 07/29/21  0559    143 144   K 3.8 3.4* 3.5   CL 98 95* 95*   CO2 38* 36* 39*   BUN 56* 43* 36*   CREATININE 2.1* 1.9* 1.8*   CALCIUM 8.6 8.5* 8.6       Assessment:    Principal Problem:    Acute congestive heart failure (HCC)  Active Problems:    Stage 4 chronic kidney disease (HCC)    Type 2 diabetes mellitus without complication, with long-term current use of insulin (HCC)    Hypertension    Vascular dementia without behavioral disturbance (HCC)    Prolonged Q-T interval on ECG    Acute respiratory failure with hypoxia (HCC)    Acute kidney injury superimposed on CKD (HCC)    Paroxysmal atrial fibrillation (HCC)  Resolved Problems:    * No resolved hospital problems.  *      Plan:    Admitted to telemetry for evaluation of acute congestive heart failure in a patient with known history of chronic kidney disease and multiple comorbidities  IV diuresis, patient remains asymptomatic with A. fib ranging from 100-1 20s-no plans for pacemaker  Status post left thoracentesis by interventional radiology 7/26/2021  Renal following for chronic renal insufficiency  Pulmonology on board for acute on chronic respiratory failure  Electrophysiology/general cardiology following for new onset atrial fibrillation with multiple pauses-no pacemaker planned-cardiology and EP have signed off  Palliative care following, consideration being given to palliation/hospice  Echocardiogram is still pending  discontinue IV antibiotics on discharge -No fevers  Decrease Lantus insulin to 12 units and change sliding scale to low-dose  Check labs today-essentially at baseline, BUN/creatinine improved  Continue to monitor labs/electrolytes      DVT Prophylaxis   PT/OT  Discharge planning--Per case management, family has no immediate plans for hospice.   We will plan discharge once authorization obtained per facility          Miquel Singletary MD  12:06 PM  7/29/2021

## 2021-07-29 NOTE — PROGRESS NOTES
Bobby Rodriguez M.D.,[facility]  Julian Lloyd D.O., F.RODERICK.O.I., Annelise Purdy M.D. Jake Delatorre M.D., Uzair Valdovinos M.D. Moncho Nair D.O. Daily Pulmonary Progress Note    Patient:  Shannan Harris 80 y.o. female MRN: [id number]     Date of Service: 7/29/2021        Subjective      Patient was seen and examined lying in bed in no apparent distress. She is still on 8 L of oxygen and is easily aroused. She denies dyspnea and cough, lung sounds are clear but diminished. She seems more confused today. 24-hour events:  None    Objective   Vitals: BP (!) 124/55   Pulse 78   Temp 99 °F (37.2 °C) (Temporal)   Resp 18   Ht 5' 4\" (1.626 m)   Wt 150 lb 1.6 oz (68.1 kg)   SpO2 94%   BMI 25.76 kg/m²     I/O:    Intake/Output Summary (Last 24 hours) at 7/29/2021 1242  Last data filed at 7/29/2021 1269  Gross per 24 hour   Intake 1083.66 ml   Output 1475 ml   Net -391.34 ml       Vent Information  Skin Assessment: Clean, dry, & intact  SpO2: 94 %                CURRENT MEDS :  Scheduled Meds:   insulin glargine  12 Units Subcutaneous Nightly    insulin lispro  0-6 Units Subcutaneous TID WC    insulin lispro  0-3 Units Subcutaneous Nightly    vancomycin  750 mg Intravenous Once    furosemide  20 mg Oral BID    cefepime (MAXIPIME) 1000 mg IVPB extended (mini-bag)  1,000 mg Intravenous Q12H    And    sodium chloride   Intravenous Q12H    aspirin  81 mg Oral Daily    levETIRAcetam  250 mg Oral QAM AC    mirtazapine  15 mg Oral Nightly    sodium chloride flush  5-40 mL Intravenous 2 times per day    enoxaparin  30 mg Subcutaneous Daily       Physical Exam:  General Appearance: appears comfortable in no acute distress. HEENT: Normocephalic atraumatic without obvious abnormality   Neck: Lips, mucosa, and tongue normal.  Supple, symmetrical, trachea midline, no adenopathy;thyroid:  no enlargement/tenderness/nodules or JVD. Lung: Breath sounds few crackles, diminished. Respirations   unlabored. Symmetrical expansion. Heart: RRR, normal S1, S2. No MRG  Abdomen: Soft, NT, ND. BS present x 4 quadrants. No bruit or organomegaly. Extremities: Pedal pulses 2+ symmetric b/l. Extremities normal, no cyanosis, clubbing, or edema. Musculokeletal: No joint swelling, no muscle tenderness. ROM normal in all joints of extremities. Neurologic: Mental status: arousable    Pertinent/ New Labs and Imaging Studies     Imaging Personally Reviewed:    7/26 cxr illustrates improved effusion s/p thoracentesis   Labs:  Lab Results   Component Value Date    WBC 10.5 07/29/2021    HGB 9.7 07/29/2021    HCT 32.2 07/29/2021    MCV 96.1 07/29/2021    MCH 29.0 07/29/2021    MCHC 30.1 07/29/2021    RDW 17.4 07/29/2021     07/29/2021    MPV 10.5 07/29/2021     Lab Results   Component Value Date     07/29/2021    K 3.5 07/29/2021    K 5.1 07/24/2021    CL 95 07/29/2021    CO2 39 07/29/2021    BUN 36 07/29/2021    CREATININE 1.8 07/29/2021    LABALBU 2.8 07/29/2021    LABALBU 3.0 03/28/2018    CALCIUM 8.6 07/29/2021    GFRAA 32 07/29/2021    LABGLOM 26 07/29/2021    LABGLOM 33 03/28/2018     Lab Results   Component Value Date    PROTIME 13.0 07/26/2021    PROTIME 12.2 06/07/2021    INR 1.2 07/26/2021     Recent Labs     07/27/21  0523   PROBNP 8,190*     No results for input(s): PROCAL in the last 72 hours. This SmartLink has not been configured with any valid records. Micro:  No results for input(s): CULTRESP in the last 72 hours. Recent Labs     07/26/21  1315   LABGRAM Refer to ordered Gram stain for results          Assessment:    1. Acute hypoxic respiratory failure secondary to pleural effusions/atelectasis  2. Bilateral pleural effusions right greater than left, appearance of partially loculated component to left pleural effusion s/p right sided thoracentesis   3. Acute decompensated CHF  4. ANGELA on CKD  5. Dementia  6.  Medical noncompliance      Plan:   1. chf per cardiology 2. S/p thoracentesis, see above. Breathing improved. 3. Wean o2 as tolerated, currently on 8 L  98%, wean to >92%  4. Continuous pulse ox  5. Recommend swallow study, probably not necessary if she is going to be under hospice care  6. Continue antibiotics but stop Vancomycin  7. Diuresis changed to PO per nephrology    Plan of care reviewed in collaboration with Dr. Lauren Lloyd, APRN-CNP      I personally saw, examined, and cared for the patient. Labs, medications, radiographs reviewed.  I agree with history exam and plans detailed in NP note with the following additions:    Hypoglycemic today - treated by the primary team  Stop Cefepime and replace with doxycycline   CXR in the AM    Electronically signed by Gabbi Chong MD on 7/29/2021 at 1:55 PM

## 2021-07-29 NOTE — PROGRESS NOTES
Comprehensive Nutrition Assessment    Type and Reason for Visit:  Initial, Consult, Wound    Nutrition Recommendations/Plan: Continue Diet. Will Start Glucerna Diabetic ONS TID and Jt Wound Healing ONS BID. If minimal intake continues x next few days, would then highly recommend to consider EN support. Please consult for further recommendations if EN needed. Nutrition Assessment:  Pt adm w/ worsening SOB x ~2 wks s/p recent adm for PNA. PMHx ANGELA on CKD (4), CHF, DM, Dementia, Dysphagia. Pt s/p Thoracentesis (-850 ml) on 7/26 2/2 Rt Pleural Effusion. Pt at risk d/t poor intake since adm d/t poor appetite w/ AMS 2/2 Dementia. At additional risk d/t increased needs for wound healing. Will Start ONS and monitor, however may need to consider EN soon if minimal intake continues x next few days. Malnutrition Assessment:  Malnutrition Status: At risk for malnutrition (Comment)    Context:  Acute Illness     Findings of the 6 clinical characteristics of malnutrition:  Energy Intake:  7 - 50% or less of estimated energy requirements for 5 or more days  Weight Loss:  Unable to assess (2/2 poor EMR wt hx pta as well as fluid shifts)     Body Fat Loss:  No significant body fat loss     Muscle Mass Loss:  No significant muscle mass loss    Fluid Accumulation:  Unable to assess (2/2 CHF hx)     Strength:  Not Performed    Estimated Daily Nutrient Needs:  Energy (kcal):  3971-6890 (MSJx1. 2SF); Weight Used for Energy Requirements:  Current     Protein (g):  65-75 (1.2-1.4 gm/kg as tolerated w/ CKD4); Weight Used for Protein Requirements:  Ideal        Fluid (ml/day):  9367-5213; Method Used for Fluid Requirements:  1 ml/kcal      Nutrition Related Findings: AMSx3, U/L Dentures, Abd/BS WDL, Trace Gen/BUE and +1 BLE edema, -I/O's      Wounds:  Multiple, Open Wounds, Partial Thickness       Current Nutrition Therapies:    ADULT DIET; Regular; 4 carb choices (60 gm/meal);  Low Sodium (2 gm)    Anthropometric Measures:  · Height: 5' 4\" (162.6 cm)  · Current Body Weight: 150 lb (68 kg) (bed 7/29)   · Admission Body Weight: 176 lb (79.8 kg) (actual 7/24)    · Usual Body Weight:  (UTO UBW 2/2 poor EMR wt hx w/ no actual wt's pta)     · Ideal Body Weight: 120 lbs; % Ideal Body Weight     · BMI: 25.7  · Adjusted Body Weight:  ; No Adjustment   · Adjusted BMI:      · BMI Categories: Overweight (BMI 25.0-29. 9)       Nutrition Diagnosis:   · Inadequate oral intake related to cognitive or neurological impairment (2/2 Dementia) as evidenced by intake 0-25%, poor intake prior to admission      Nutrition Interventions:   Food and/or Nutrient Delivery:  Continue Current Diet, Start Oral Nutrition Supplement (Continue Diet. Will Start Glucerna Diabetic ONS TID. If minimal intake continues x next few days, would then highly recommend to consider EN support. Please consult for further recommendations if EN needed.)  Nutrition Education/Counseling:  Education not appropriate   Coordination of Nutrition Care:  Continue to monitor while inpatient    Goals:  PO intake >50% of meals/ONS. Nutrition Monitoring and Evaluation:   Behavioral-Environmental Outcomes:  None Identified   Food/Nutrient Intake Outcomes:  Diet Advancement/Tolerance, Food and Nutrient Intake, Supplement Intake  Physical Signs/Symptoms Outcomes:  Biochemical Data, Chewing or Swallowing, GI Status, Fluid Status or Edema, Nutrition Focused Physical Findings, Skin, Weight     Discharge Planning:     Too soon to determine     Electronically signed by Lavon Metzger RD, LD on 7/29/21 at 2:28 PM EDT    Contact: ext [phone]

## 2021-07-29 NOTE — PROGRESS NOTES
Date:2021  Patient Name: Javon Barber  MRN: [id number]  : 1926  Room: [address] [place] [address]A             OCCUPATIONAL THERAPY TREATMENT NOTE    [address] [place] [address]*  [address] [place] [address]              Evaluating OT: Yunier Barillas OTR/L   [id number]       Referring Fazal Gray MD    Specific Provider Orders/Date:OT eval and treat 2021       Diagnosis: CHF      Pertinent Medical History: Alzheimer's dementia, HTN, A fib,     Precautions:  Fall Risk, alarm, TSM, O2, [place]      Assessment of current deficits    [x]? Functional mobility            [x]?ADLs           [x]? Strength                  [x]? Cognition    [x]? Functional transfers          [x]? IADLs         [x]? Safety Awareness   [x]? Endurance    []? Fine Coordination                         [x]? Balance      []? Vision/perception   []? Sensation      []? Gross Motor Coordination             []? ROM           []? Delirium                   []? Motor Control      OT PLAN OF CARE   OT POC based on physician orders, patient diagnosis and results of clinical assessment     Frequency/Duration  1-3 days/wk for 2 weeks PRN   Specific OT Treatment Interventions to include:   ADL retraining/adapted techniques and AE recommendations to increase functional independence within precautions                    ?Energy conservation techniques to improve tolerance for selfcare routine   Functional transfer/mobility training/DME recommendations for increased independence, safety and fall prevention         Patient/family education to increase safety and functional independence             Environmental modifications for safe mobility and completion of ADLs                             Therapeutic activity to improve functional performance during ADLs.                                          Therapeutic exercise to improve tolerance and functional strength for ADLs   Balance retraining/tolerance tasks for facilitation of postural control with dynamic challenges during ADLs .    Positioning to improve functional independence     Recommended Adaptive Equipment: TBD      SOCIAL: per daughter - patient has been at [facility] for past 3 years (ambulating with with w/walker - for  past 3 months has been in [facility]. Using primarily w/c for mobility (patient does try to propel with her arms and legs) assist with ADLS.       Pain Level:  No pain this session ;   Cognition: A&O: pleasant, following commands               Memory:  Forgetful               Sequencing:  Fair               Problem solving:  Fair               Judgement/safety:  Fair                 Functional Assessment:  AM-PAC Daily Activity Raw Score: 14/24    Initial Eval Status  Date: 7/27/21 Treatment Status  Date: 7/29/21 STGs = LTGs  Time frame: 10-14 days   Feeding SBA/Set-up  n/t Supervision    Grooming Mod A ,seated   Decrease standing balance/tolerance MIN A pt seated EOB to wash face, and brush hair with pt requiring assist due to proficiency  SBA   UB Dressing Mod A  n/t  SBA    LB Dressing Max A  MAX A to crystal pants, crystal/doff socks; pt requiring assist to thread pants over B LE, maintain standing balance, pull up around waist; total assist to thread socks on B LE v/c's for safety with pt lifting B LE at the same time causing pt to have posterior lean  Mod A    Bathing Max A  MAX A pt engaged in bathing tasks requiring assist with B LE bathing; demo'd ability to complete jack care with assist to bathe buttocks;   Mod A    Toileting Assist with hygiene  N/t navarro catheter present   Mod A    Bed Mobility  Mod A  Supine <> sit  MIN A supine>sit pt requiring assist to raise UB when moving to sit EOB v/c's for hand placement and technique   Min A   Functional Transfers Mod A  Sit-stand from bed  MOD A sit<>stand from EOB to w/w v/c's for hand placement    Min/CGA    Functional Mobility Mod A,w/walker,O2  Side steps next to bed only   Decrease balance/endurance No complaints of SOB  MOD A with w/w less than house hold distances v/c's for walker safety  Min A  with good tolerance    Balance Sitting:     Static:  SBA- EOB     Dynamic:Mod A   Standing: Mod A  Sitting:   Static: SBA EOB   Dynamic: MIN A   Standing: MIN A with w/w   Dynamic: MOD A with w/w  Min A standing   Supervision - sitting    Activity Tolerance Fair - with light activity  Fair- light ADL tasks and functional transfers/mobility   Good  with ADL activity    Visual/  Perceptual Glasses: none by bedside                             Comments: Upon arrival pt supine in bed, agreeable to therapy session. Pt educated with regards to bed mobility, functional tranfers, functional mobility, hand placement, walker safety, LE dressing techniques, grooming tasks, bathing techniques, DME, safety awareness. At end of session pt seated in chair nursing aware,  all lines and tubes intact, call light within reach. · Pt has made fair  progress towards set goals.    · Continue with current plan of care      Treatment Time In:1345            Treatment Time Out: 8391                Treatment Charges: Mins Units   Ther Ex  15030     Manual Therapy [id number]     Thera Activities 52772 15 1   ADL/Home Mgt 26725 25 2   Neuro Re-ed 82586     Group Therapy      Orthotic manage/training  29700     Non-Billable Time     Total Timed Treatment [address] [place]/L [id number]

## 2021-07-29 NOTE — PROGRESS NOTES
Lab called with critical BS-39. Sissy Meier RN aware and between night RN and himself have corrected BS and last recheck 122.

## 2021-07-29 NOTE — PROGRESS NOTES
Pharmacy Consultation Note  (Antibiotic Dosing and Monitoring)    Initial consult date: 7/25/21  Consulting physician: Dr. Bebo Boothe  Drug: Vancomycin  Indication: Pneumonia (HAP)     Vancomycin has been discontinued   [address] Physician to re-consult pharmacy if future dosing is needed    Thank you for the consult,    Yury BelcherD, BCPS 7/29/2021 2:55 PM  Phone: [phone]

## 2021-07-29 NOTE — PROGRESS NOTES
Department of Internal Medicine  Nephrology Progress Note    Events reviewed. Subjective: We are following Ms. Javon Barber for Hyperkalemia and ANGELA on CKD. Patient is much more alert and conversational today. PHYSICAL EXAM:      Vitals:    VITALS:  BP (!) 124/55   Pulse 78   Temp 99 °F (37.2 °C) (Temporal)   Resp 18   Ht 5' 4\" (1.626 m)   Wt 150 lb 1.6 oz (68.1 kg)   SpO2 94%   BMI 25.76 kg/m²   24HR INTAKE/OUTPUT:      Intake/Output Summary (Last 24 hours) at 7/29/2021 1240  Last data filed at 7/29/2021 3235  Gross per 24 hour   Intake 1083.66 ml   Output 1475 ml   Net -391.34 ml       Constitutional:  Alert, oriented to person only  HEENT:  Normocephalic and atraumatic. Respiratory:  Diminished. On 8L HFNC  Cardiovascular/Edema:  Normal rate and regular rhythm. Gastrointestinal:  Soft, nontender, + Bowel sounds  Neurologic:  She is alert and oriented to person, place, and time. Skin:  Skin is warm and dry. Bilateral lower extremity anterior tibial wounds    Scheduled Meds:   insulin glargine  12 Units Subcutaneous Nightly    insulin lispro  0-6 Units Subcutaneous TID WC    insulin lispro  0-3 Units Subcutaneous Nightly    vancomycin  750 mg Intravenous Once    furosemide  20 mg Oral BID    cefepime (MAXIPIME) 1000 mg IVPB extended (mini-bag)  1,000 mg Intravenous Q12H    And    sodium chloride   Intravenous Q12H    aspirin  81 mg Oral Daily    levETIRAcetam  250 mg Oral QAM AC    mirtazapine  15 mg Oral Nightly    sodium chloride flush  5-40 mL Intravenous 2 times per day    enoxaparin  30 mg Subcutaneous Daily     Continuous Infusions:   sodium chloride      dextrose       PRN Meds:. LORazepam, perflutren lipid microspheres, ipratropium-albuterol, sodium chloride flush, sodium chloride, polyethylene glycol, acetaminophen **OR** acetaminophen, glucose, dextrose, glucagon (rDNA), dextrose    DATA:    CBC:   Lab Results   Component Value Date    WBC 10.5 07/29/2021    RBC 3.35 07/29/2021    HGB 9.7 07/29/2021    HCT 32.2 07/29/2021    MCV 96.1 07/29/2021    MCH 29.0 07/29/2021    MCHC 30.1 07/29/2021    RDW 17.4 07/29/2021     07/29/2021    MPV 10.5 07/29/2021     CMP:    Lab Results   Component Value Date     07/29/2021    K 3.5 07/29/2021    K 5.1 07/24/2021    CL 95 07/29/2021    CO2 39 07/29/2021    BUN 36 07/29/2021    CREATININE 1.8 07/29/2021    GFRAA 32 07/29/2021    LABGLOM 26 07/29/2021    LABGLOM 33 03/28/2018    GLUCOSE 39 07/29/2021    GLUCOSE 263 03/28/2018    PROT 5.6 07/29/2021    LABALBU 2.8 07/29/2021    LABALBU 3.0 03/28/2018    CALCIUM 8.6 07/29/2021    BILITOT 0.3 07/29/2021    BILITOT NEGATIVE 10/03/2017    ALKPHOS 116 07/29/2021    AST 26 07/29/2021    ALT 18 07/29/2021     Magnesium:    Lab Results   Component Value Date    MG 1.9 07/27/2021     Phosphorus:    Lab Results   Component Value Date    PHOS 3.2 07/27/2021     Urine Studies:   Results for Cam Ibrahim (MRN [id number]) as of 7/25/2021 10:02   Ref.  Range 7/24/2021 15:40 7/24/2021 15:40   Creatinine, Ur Latest Ref Range: 29 - 226 mg/dL 27 (L) 27 (L)   Microalbumin Creatinine Ratio Latest Ref Range: 0.0 - 30.0   58.1 (H)   Microalbumin, Random Urine Latest Ref Range: Not Established mg/L  15.7   Osmolality, Ur Latest Ref Range: 300 - 900 mOsm/kg 347    Protein, Ur Latest Ref Range: 0 - 12 mg/dL 5    Protein/Creat Ratio Latest Ref Range: 0.0 - 0.2  0.2 0.2   Urea Nitrogen, Ur Latest Ref Range: 800 - 1666 mg/dL 255 (L)          Radiology Review:      CT Head 7/23/21   1.  Slightly limited exam, grossly negative for acute intracranial process,   within the limits of this non-contrast CT exam.       2.  Sequela of atherosclerotic arterial and chronic microvascular ischemic   disease, as described.       3.  Age-appropriate, generalized parenchymal volume loss.       4.  Paranasal sinus, left mastoid air cell complex, and bilateral external   auditory canal disease, as described.  The right mastoid air cell complex is   moderately/severely hypoplastic.       5.  Chronic osseous findings, as described     CT chest 7/23/21   1. Moderate pleural effusions, right larger than left.  Left pleural effusion   is partially loculated. 2. Right lower lobe consolidation and volume loss, likely atelectasis   although superimposed pneumonia/infiltrate not excluded. 3. Mild interstitial thickening probably represents mild interstitial edema. Renal US 7/24/21   Bilateral renal cortical thinning which suggest changes related to chronic   underlying medical renal disease.  No hydronephrosis.       The bladder was not distended for this exam and could not be evaluated. CXR 7/25/2021   1.  Interval slight increase in the right greater than left pleural effusions   and mid to lower lung opacities.  No large pneumothorax.       2.  Atherosclerotic disease, cardiomegaly, and central pulmonary vascular   congestion are unchanged. BRIEF SUMMARY OF INITIAL CONSULT:    Briefly, Mrs. Sloane Tan is a 80year old female with a past medical history of CKD Stage III baseline creatinine 2.5-2.7mg/dL, Anemia, Diabetes Mellitus type 2, Alzheimer's, Hypertension, CHF, who presented to the ER on 7/23/21 with complaints of shortness of breath. Upon arrival to the ER she was found to be hypoxic and was placed on nonrebreather and treated with duo nebs and steroids. Patient's daughter states she was diagnosed 2  Weeks ago with Pneumonia and was admitted to the hospital. She was discharged to a skilled nursing facility where she was noncompliant with her medications and inhalers. She was also found to be hyperkalemic and hyperkalemic protocol was iniated. Pertinent labs include: potassium: 5.9 mmol/L, BUN 81, Creatinine 3.1 mg/dL, Anion gap 18, CO2 21, Pro- BNP 7,692. Chest Xray was consistent with CHF exacerbation. Renal was consulted for Hyperkalemia and ANGELA on CKD.     Problems resolved:  · Hyperkalemia, S/P hyperkalemic protocol, s/p Lokelma  · Hypernatremia   · ANGELA stage I on CKD, likely hemodynamically mediated pre-renal ANGELA secondary to cardiorenal syndrome, non complaint with medications at skilled nursing facility. Nonoliguric. Renal function continues to improve better than baseline with diuretic administration, creatinine 1.8 mg/dl. IMPRESSION/RECOMMENDATIONS:      1. CKD stage IV, without proteinuria, baseline creatinine 2.5-2.7mg/dL, secondary to hypertensive nephrosclerosis. 2. Hypokalemia, secondary to poor oral intake and use of diuretics, for replacement  3. Elevated bicarbonate level, contraction alkalosis versus chronic respiratory acidosis, obtain venous pH for diagnosis  4. Acidemia, (pH 7.33), 2/2 respiratory acidosis  5. HTN, BP medications on hold due to hypotension   6. HF, echo pending, pro-BNP 7,692>>8,190, on Lasix  7. Acute respiratory failure, secondary to # 4. Requiring high levels of supplemental oxygen, 7 L HFNC. 8. Right pleural effusion, s/p thoracentesis  9. New onset PAF with multiple pauses, metoprolol on hold   10. Anemia: Iron 39 mcg/dL, Iron Saturation 16%, TIBC 247 mcg/dL, on IV iron. 11. Low grade temp, cultures obtained, on vanc/cefepime  12. Nutrition, low sodium diabetic diet,    Plan:    · Start D5W with 20mEq of KCL at 50 cc/hr  · Continue lasix 20 mg po  · Replace potassium  · Continue IV iron   · Continue strict I&Os  · Continue to monitor sodium level  · Low sodium diet  · Encourage fluid intake  · Give albumin 25 g Q 8hrs X 3 doses    Electronically signed by CRAIG Riggs CNP on 7/29/2021 at 12:40 PM       Patient seen and examined independently by me  Continue with p.o.  Lasix 20 mg twice daily  Start IV D5 water with 20 mEq of potassium chloride for persistent hypernatremia  Continue to monitor the renal function and electrolytes closely    Carolyne Lares MD

## 2021-07-30 ENCOUNTER — APPOINTMENT (OUTPATIENT)
Dept: GENERAL RADIOLOGY | Age: 86
DRG: 291 | End: 2021-07-30
Payer: COMMERCIAL

## 2021-07-30 LAB
ALBUMIN SERPL-MCNC: 3.5 G/DL (ref 3.5–5.2)
ALP BLD-CCNC: 94 U/L (ref 35–104)
ALT SERPL-CCNC: 17 U/L (ref 0–32)
ANION GAP SERPL CALCULATED.3IONS-SCNC: 5 MMOL/L (ref 7–16)
AST SERPL-CCNC: 20 U/L (ref 0–31)
BASOPHILS ABSOLUTE: 0.05 E9/L (ref 0–0.2)
BASOPHILS RELATIVE PERCENT: 0.6 % (ref 0–2)
BILIRUB SERPL-MCNC: 0.6 MG/DL (ref 0–1.2)
BLOOD CULTURE, ROUTINE: NORMAL
BUN BLDV-MCNC: 31 MG/DL (ref 6–23)
CALCIUM SERPL-MCNC: 8.6 MG/DL (ref 8.6–10.2)
CHLORIDE BLD-SCNC: 95 MMOL/L (ref 98–107)
CO2: 39 MMOL/L (ref 22–29)
CREAT SERPL-MCNC: 1.8 MG/DL (ref 0.5–1)
CULTURE, BLOOD 2: NORMAL
EOSINOPHILS ABSOLUTE: 0.65 E9/L (ref 0.05–0.5)
EOSINOPHILS RELATIVE PERCENT: 7.3 % (ref 0–6)
GFR AFRICAN AMERICAN: 32
GFR NON-AFRICAN AMERICAN: 26 ML/MIN/1.73
GLUCOSE BLD-MCNC: 125 MG/DL (ref 74–99)
HCT VFR BLD CALC: 29.6 % (ref 34–48)
HEMOGLOBIN: 8.7 G/DL (ref 11.5–15.5)
IMMATURE GRANULOCYTES #: 0.06 E9/L
IMMATURE GRANULOCYTES %: 0.7 % (ref 0–5)
LYMPHOCYTES ABSOLUTE: 1.45 E9/L (ref 1.5–4)
LYMPHOCYTES RELATIVE PERCENT: 16.3 % (ref 20–42)
MCH RBC QN AUTO: 28.7 PG (ref 26–35)
MCHC RBC AUTO-ENTMCNC: 29.4 % (ref 32–34.5)
MCV RBC AUTO: 97.7 FL (ref 80–99.9)
METER GLUCOSE: 156 MG/DL (ref 74–99)
METER GLUCOSE: 162 MG/DL (ref 74–99)
METER GLUCOSE: 174 MG/DL (ref 74–99)
METER GLUCOSE: 195 MG/DL (ref 74–99)
MONOCYTES ABSOLUTE: 1.12 E9/L (ref 0.1–0.95)
MONOCYTES RELATIVE PERCENT: 12.6 % (ref 2–12)
NEUTROPHILS ABSOLUTE: 5.56 E9/L (ref 1.8–7.3)
NEUTROPHILS RELATIVE PERCENT: 62.5 % (ref 43–80)
PDW BLD-RTO: 17.1 FL (ref 11.5–15)
PLATELET # BLD: 201 E9/L (ref 130–450)
PMV BLD AUTO: 10.6 FL (ref 7–12)
POTASSIUM SERPL-SCNC: 4 MMOL/L (ref 3.5–5)
RBC # BLD: 3.03 E12/L (ref 3.5–5.5)
SARS-COV-2, NAAT: NOT DETECTED
SODIUM BLD-SCNC: 139 MMOL/L (ref 132–146)
TOTAL PROTEIN: 6.1 G/DL (ref 6.4–8.3)
WBC # BLD: 8.9 E9/L (ref 4.5–11.5)

## 2021-07-30 PROCEDURE — 2700000000 HC OXYGEN THERAPY PER DAY

## 2021-07-30 PROCEDURE — 2140000000 HC CCU INTERMEDIATE R&B

## 2021-07-30 PROCEDURE — 2500000003 HC RX 250 WO HCPCS: Performed by: STUDENT IN AN ORGANIZED HEALTH CARE EDUCATION/TRAINING PROGRAM

## 2021-07-30 PROCEDURE — 80053 COMPREHEN METABOLIC PANEL: CPT

## 2021-07-30 PROCEDURE — 6370000000 HC RX 637 (ALT 250 FOR IP): Performed by: NURSE PRACTITIONER

## 2021-07-30 PROCEDURE — 92611 MOTION FLUOROSCOPY/SWALLOW: CPT

## 2021-07-30 PROCEDURE — 6360000002 HC RX W HCPCS: Performed by: INTERNAL MEDICINE

## 2021-07-30 PROCEDURE — P9047 ALBUMIN (HUMAN), 25%, 50ML: HCPCS | Performed by: NURSE PRACTITIONER

## 2021-07-30 PROCEDURE — 82962 GLUCOSE BLOOD TEST: CPT

## 2021-07-30 PROCEDURE — 2580000003 HC RX 258: Performed by: INTERNAL MEDICINE

## 2021-07-30 PROCEDURE — 74230 X-RAY XM SWLNG FUNCJ C+: CPT

## 2021-07-30 PROCEDURE — 85025 COMPLETE CBC W/AUTO DIFF WBC: CPT

## 2021-07-30 PROCEDURE — 87635 SARS-COV-2 COVID-19 AMP PRB: CPT

## 2021-07-30 PROCEDURE — 6360000002 HC RX W HCPCS: Performed by: NURSE PRACTITIONER

## 2021-07-30 PROCEDURE — 71045 X-RAY EXAM CHEST 1 VIEW: CPT

## 2021-07-30 PROCEDURE — 36415 COLL VENOUS BLD VENIPUNCTURE: CPT

## 2021-07-30 PROCEDURE — 6370000000 HC RX 637 (ALT 250 FOR IP): Performed by: INTERNAL MEDICINE

## 2021-07-30 RX ORDER — FUROSEMIDE 20 MG/1
20 TABLET ORAL 2 TIMES DAILY
Qty: 60 TABLET | Refills: 3 | Status: SHIPPED | OUTPATIENT
Start: 2021-07-30

## 2021-07-30 RX ADMIN — INSULIN LISPRO 1 UNITS: 100 INJECTION, SOLUTION INTRAVENOUS; SUBCUTANEOUS at 21:28

## 2021-07-30 RX ADMIN — FUROSEMIDE 20 MG: 20 TABLET ORAL at 09:34

## 2021-07-30 RX ADMIN — BARIUM SULFATE 15 ML: 400 PASTE ORAL at 14:26

## 2021-07-30 RX ADMIN — Medication 10 ML: at 21:31

## 2021-07-30 RX ADMIN — MIRTAZAPINE 15 MG: 15 TABLET, FILM COATED ORAL at 21:29

## 2021-07-30 RX ADMIN — POTASSIUM CHLORIDE 20 MEQ: 1500 TABLET, EXTENDED RELEASE ORAL at 09:33

## 2021-07-30 RX ADMIN — BARIUM SULFATE 15 ML: 400 SUSPENSION ORAL at 14:26

## 2021-07-30 RX ADMIN — ASPIRIN 81 MG: 81 TABLET, CHEWABLE ORAL at 09:33

## 2021-07-30 RX ADMIN — ALBUMIN (HUMAN) 25 G: 0.25 INJECTION, SOLUTION INTRAVENOUS at 06:04

## 2021-07-30 RX ADMIN — ENOXAPARIN SODIUM 30 MG: 30 INJECTION SUBCUTANEOUS at 21:28

## 2021-07-30 RX ADMIN — DOXYCYCLINE HYCLATE 100 MG: 100 CAPSULE ORAL at 21:29

## 2021-07-30 RX ADMIN — DOXYCYCLINE HYCLATE 100 MG: 100 CAPSULE ORAL at 09:33

## 2021-07-30 RX ADMIN — Medication 10 ML: at 11:00

## 2021-07-30 RX ADMIN — INSULIN GLARGINE 12 UNITS: 100 INJECTION, SOLUTION SUBCUTANEOUS at 21:28

## 2021-07-30 RX ADMIN — LEVETIRACETAM 250 MG: 500 TABLET ORAL at 06:08

## 2021-07-30 RX ADMIN — FUROSEMIDE 20 MG: 20 TABLET ORAL at 17:45

## 2021-07-30 ASSESSMENT — PAIN SCALES - GENERAL
PAINLEVEL_OUTOF10: 0

## 2021-07-30 NOTE — PROGRESS NOTES
symmetric b/l. Extremities normal, no cyanosis, clubbing, or edema. Musculokeletal: No joint swelling, no muscle tenderness. ROM normal in all joints of extremities. Neurologic: Mental status: arousable    Pertinent/ New Labs and Imaging Studies     Imaging Personally Reviewed:    7/26 cxr illustrates improved effusion s/p thoracentesis     7/30  Interval development of small right-sided pleural effusion.       Improvement of small left-sided pleural effusion.       Bilateral opacities may represent pneumonia in the proper clinical setting           Labs:  Lab Results   Component Value Date    WBC 8.9 07/30/2021    HGB 8.7 07/30/2021    HCT 29.6 07/30/2021    MCV 97.7 07/30/2021    MCH 28.7 07/30/2021    MCHC 29.4 07/30/2021    RDW 17.1 07/30/2021     07/30/2021    MPV 10.6 07/30/2021     Lab Results   Component Value Date     07/30/2021    K 4.0 07/30/2021    K 5.1 07/24/2021    CL 95 07/30/2021    CO2 39 07/30/2021    BUN 31 07/30/2021    CREATININE 1.8 07/30/2021    LABALBU 3.5 07/30/2021    LABALBU 3.0 03/28/2018    CALCIUM 8.6 07/30/2021    GFRAA 32 07/30/2021    LABGLOM 26 07/30/2021    LABGLOM 33 03/28/2018     Lab Results   Component Value Date    PROTIME 13.0 07/26/2021    PROTIME 12.2 06/07/2021    INR 1.2 07/26/2021        Assessment:    1. Acute hypoxic respiratory failure secondary to pleural effusions/atelectasis  2. Bilateral pleural effusions right greater than left, appearance of partially loculated component to left pleural effusion s/p right sided thoracentesis   3. Acute decompensated CHF  4. ANGELA on CKD  5. Dementia  6. Medical noncompliance      Plan:   1. chf per cardiology   2. S/p thoracentesis, see above. Breathing improved. 3. Wean o2 as tolerated, currently on 2 L  91%, wean to >92%  4. Recommend swallow study, probably not necessary if she is going to be under hospice care  5. Antibiotics changed to doxycycline   6. Diuresis changed to PO per nephrology  7.  cxr improving    Plan of care reviewed in collaboration with Dr. Costella Simmonds, APRN-CNP    I personally saw, examined, and cared for the patient. Labs, medications, radiographs reviewed.  I agree with history exam and plans detailed in NP note with the following additions:    She is now on 2L with SpO2 91%  She is not tolerating orals very well - pocketing pills etc  Swallow eval ordered by the primary team  Completed a course of antibiotics  Gentle diuresis  CXR is improving      Electronically signed by Janes Jiang MD on 7/30/2021 at 2:04 PM

## 2021-07-30 NOTE — PATIENT CARE CONFERENCE
[facility] Quality Flow/Interdisciplinary Rounds Progress Note        Quality Flow Rounds held on July 30, 2021    Disciplines Attending:  Bedside Nurse,  and Nursing Unit Leadership    Joshua Madison was admitted on 7/23/2021 11:33 AM    Anticipated Discharge Date:  Expected Discharge Date: 07/30/21    Disposition:    Rigo Score:  Rigo Scale Score: 12    Readmission Risk              Risk of Unplanned Readmission:  25           Discussed patient goal for the day, patient clinical progression, and barriers to discharge.   The following Goal(s) of the Day/Commitment(s) have been identified:  keep patient and family informed      Hussein Bennett RN  July 30, 2021

## 2021-07-30 NOTE — PROGRESS NOTES
Department of Internal Medicine  Nephrology Progress Note    Events reviewed. Subjective: We are following MsNic Morales for Hyperkalemia and ANGELA on CKD. She reports no complaints. PHYSICAL EXAM:      Vitals:    VITALS:  BP (!) 111/46   Pulse 71   Temp (!) 36.9 °F (2.7 °C) (Temporal)   Resp 18   Ht 5' 4\" (1.626 m)   Wt 148 lb 9.6 oz (67.4 kg)   SpO2 97%   BMI 25.51 kg/m²   24HR INTAKE/OUTPUT:      Intake/Output Summary (Last 24 hours) at 7/30/2021 1143  Last data filed at 7/30/2021 0700  Gross per 24 hour   Intake 766 ml   Output 1550 ml   Net -784 ml       Constitutional:  Alert, oriented to person only  HEENT:  Normocephalic and atraumatic. Respiratory:  Diminished. On 6L HFNC  Cardiovascular/Edema:  Normal rate and regular rhythm. Gastrointestinal:  Soft, nontender, + Bowel sounds  Neurologic:  She is alert and oriented to person, place, and time. Skin:  Skin is warm and dry. Bilateral lower extremity anterior tibial wounds    Scheduled Meds:   insulin glargine  12 Units Subcutaneous Nightly    insulin lispro  0-6 Units Subcutaneous TID WC    insulin lispro  0-3 Units Subcutaneous Nightly    doxycycline hyclate  100 mg Oral 2 times per day    furosemide  20 mg Oral BID    aspirin  81 mg Oral Daily    levETIRAcetam  250 mg Oral QAM AC    mirtazapine  15 mg Oral Nightly    sodium chloride flush  5-40 mL Intravenous 2 times per day    enoxaparin  30 mg Subcutaneous Daily     Continuous Infusions:   IV infusion builder 50 mL/hr at 07/29/21 1624    sodium chloride      dextrose       PRN Meds:. LORazepam, perflutren lipid microspheres, ipratropium-albuterol, sodium chloride flush, sodium chloride, polyethylene glycol, acetaminophen **OR** acetaminophen, glucose, dextrose, glucagon (rDNA), dextrose    DATA:    CBC:   Lab Results   Component Value Date    WBC 8.9 07/30/2021    RBC 3.03 07/30/2021    HGB 8.7 07/30/2021    HCT 29.6 07/30/2021    MCV 97.7 07/30/2021    MCH 28.7 findings, as described     CT chest 7/23/21   1. Moderate pleural effusions, right larger than left.  Left pleural effusion   is partially loculated. 2. Right lower lobe consolidation and volume loss, likely atelectasis   although superimposed pneumonia/infiltrate not excluded. 3. Mild interstitial thickening probably represents mild interstitial edema. Renal US 7/24/21   Bilateral renal cortical thinning which suggest changes related to chronic   underlying medical renal disease.  No hydronephrosis.       The bladder was not distended for this exam and could not be evaluated. CXR 7/30/2021   Interval development of small right-sided pleural effusion.       Improvement of small left-sided pleural effusion.       Bilateral opacities may represent pneumonia in the proper clinical setting           BRIEF SUMMARY OF INITIAL CONSULT:    Briefly, Mrs. Lalito Godoy is a 80year old female with a past medical history of CKD Stage III baseline creatinine 2.5-2.7mg/dL, Anemia, Diabetes Mellitus type 2, Alzheimer's, Hypertension, CHF, who presented to the ER on 7/23/21 with complaints of shortness of breath. Upon arrival to the ER she was found to be hypoxic and was placed on nonrebreather and treated with duo nebs and steroids. Patient's daughter states she was diagnosed 2  Weeks ago with Pneumonia and was admitted to the hospital. She was discharged to a skilled nursing facility where she was noncompliant with her medications and inhalers. She was also found to be hyperkalemic and hyperkalemic protocol was iniated. Pertinent labs include: potassium: 5.9 mmol/L, BUN 81, Creatinine 3.1 mg/dL, Anion gap 18, CO2 21, Pro- BNP 7,692. Chest Xray was consistent with CHF exacerbation. Renal was consulted for Hyperkalemia and ANGELA on CKD.     Problems resolved:  · Hyperkalemia, S/P hyperkalemic protocol, s/p Lokelma  · Hypernatremia   · ANGELA stage I on CKD, likely hemodynamically mediated pre-renal ANGELA secondary to cardiorenal syndrome, non complaint with medications at skilled nursing facility. Nonoliguric. Renal function continues to improve better than baseline with diuretic administration, creatinine 1.8 mg/dl. IMPRESSION/RECOMMENDATIONS:      1. CKD stage IV, without proteinuria, baseline creatinine 2.5-2.7mg/dL, secondary to hypertensive nephrosclerosis. Renal function stable. 2. Hypokalemia, secondary to poor oral intake and diuretics. Potassium level improved. 3. Hypernatremia, secondary to lack of free water intake. Sodium level improved. 4. Normal pH (venous pH 7.37, calculated PCO2 59.7), with respiratory acidosis and metabolic alkalosis. 5. HTN, BP medications on hold due to hypotension   6. HFpEF 55-60% with stage II, pro-BNP 7,692>>8,190, on Lasix  7. Acute respiratory failure, secondary to # 4. Requiring high levels of supplemental oxygen, 7 L HFNC. 8. Right pleural effusion, s/p thoracentesis  9. New onset PAF with multiple pauses, metoprolol on hold   10. Anemia: Iron 39 mcg/dL, Iron Saturation 16%, TIBC 247 mcg/dL, s/p IV iron. 11. Low grade temp, cultures obtained, on doxycycline  12.  Nutrition, low sodium diabetic diet    Plan:    · Stop IV fluids  · Continue Lasix 20 mg PO BID  · Continue strict I&Os  · Continue to monitor sodium level  · Encourage fluid intake    Electronically signed by CRAIG Ernandez CNP on 7/30/2021 at 3:34 PM      Agree with the above exam  Assessment and plan as directed by me  Okay to discharge from renal standpoint    Laquita Clay MD

## 2021-07-30 NOTE — PLAN OF CARE
Problem: Confusion - Acute:  Goal: Absence of continued neurological deterioration signs and symptoms  Description: Absence of continued neurological deterioration signs and symptoms  Outcome: Met This Shift     Problem: Discharge Planning:  Goal: Ability to perform activities of daily living will improve  Description: Ability to perform activities of daily living will improve  Outcome: Met This Shift     Problem: Mood - Altered:  Goal: Mood stable  Description: Mood stable  Outcome: Met This Shift     Problem: Psychomotor Activity - Altered:  Goal: Absence of psychomotor disturbance signs and symptoms  Description: Absence of psychomotor disturbance signs and symptoms  Outcome: Met This Shift

## 2021-07-30 NOTE — PROGRESS NOTES
SPEECH/LANGUAGE PATHOLOGY  VIDEOFLUOROSCOPIC STUDY OF SWALLOWING (MBS)   and PLAN OF CARE    PATIENT NAME:  Sana Carranza  (female)     MRN:  [id number]    :  [id number]  (80 y.o.)  STATUS:  Inpatient: Room 6503/6503-A    TODAY'S DATE:  2021  REFERRING PROVIDER:   Dr. Mina Toney: SLP video swallow  Date of order:  21   REASON FOR REFERRAL: dysphagia   EVALUATING THERAPIST: CLIVE Petersen      RESULTS:      DYSPHAGIA DIAGNOSIS:  moderate oropharyngeal phase dysphagia . Study was limited due to pt alertness and cooperation     DIET RECOMMENDATIONS:  Pureed consistency solids (dysphagia 1) with  honey consistency (moderately thick) liquids.   May need to consider tube feedings for nutritional support    FEEDING RECOMMENDATIONS:    Assistance level:  Full assistance is needed during all oral intake     Compensatory strategies recommended: No strategies are recommended at this time     Discussed recommendations with nursing and/or faxed report to referring provider: Nursing not available, diet change recommendation placed in Physician sticky note section     SPEECH THERAPY  PLAN OF CARE   The dysphagia POC is established based on physician order and dysphagia diagnosis    Dysphagia therapy is not recommended due to cognitive function      Conditions Requiring Skilled Therapeutic Intervention for dysphagia:    not applicable    SPECIFIC DYSPHAGIA INTERVENTIONS TO INCLUDE:     Not applicable    Specific instructions for next treatment:  not applicable   Treatment Goals:    Short Term Goals:  Not applicable no therapy warranted     Long Term Goals:   Not applicable no therapy warranted      Patient/family Goal:    not applicable                      ADMITTING DIAGNOSIS: Acute congestive heart failure, unspecified heart failure type ([facility] [address] [I50.9]     VISIT DIAGNOSIS:   Visit Diagnoses       Codes    Acute on chronic congestive heart failure, unspecified heart failure type ([address]     I50.9 Hyperkalemia     E87.5    Acute renal failure superimposed on chronic kidney disease, unspecified CKD stage, unspecified acute renal failure type ([facility]     N17.9, N18.9              PATIENT REPORT/COMPLAINT: none reported    PRIOR LEVEL OF SWALLOW FUNCTION:    Past History of Dysphagia?:  none reported    Home diet: Regular consistency solids with  thin liquids    PROCEDURE:  Consistencies Administered During the Evaluation   Liquids: nectar thick liquid and honey thick liquid   Solids:  pureed foods      Method of Intake:   cup, spoon  Fed by clinician      Position:   Seated, upright, Lateral plane    INSTRUMENTAL ASSESSMENT:    ORAL PREPARATION PHASE:    Decreased Bolus Formation    ORAL PHASE:   Loss of Bolus from Lips, Impaired AP Movement and Reduced Bolus Formation    PHARYNGEAL PHASE:     ONSET TIME       Delayed initiation of the pharyngeal swallow was noted with swallow reflex triggered at the level of the vallecula        PHARYNGEAL RESIDUALS        Vallecula/Pharyngeal Wall           No significant residuals were noted in the vallecula      Pyriform Sinuses      No significant residuals were noted in the pyriform sinuses     LARYNGEAL PENETRATION   Laryngeal penetration occurred in the absence of aspiration DURING the swallow for nectar consistency liquid due to  inadequate laryngeal closure which remained in the laryngeal vestibule.  . Laryngeal penetration was mild-moderate and occurred consistently   an absent cough/throat clear was noted    ASPIRATION  Aspiration  was not present during this evaluation however there is high risk for aspiration  of thin liquid and nectar consistency liquid due to laryngeal penetration    PENETRATION-ASPIRATION SCALE (PAS):  THIN item not administered  MILDLY THICK 3 = Material enters the airway, remains above the vocal folds, and is not ejected from the airway  MODERATELY THICK 1 = Material does not enter the airway  PUREE 1 = Material does not enter the airway  HARD SOLID item not administered       COMPENSATORY STRATEGIES    Compensatory strategies were not attempted      STRUCTURAL/FUNCTIONAL ANOMALIES   No structural/functional anomalies were noted    CERVICAL ESOPHAGEAL STAGE :     Was not assessed          ___________    Cognition:   Did not follow commands and Needs frequent verbal cues to maintain adequate alertness    Oral Peripheral Examination   Generalized oral weakness    Current Respiratory Status   2 liters nasal cannula     Parameters of Speech Production  Respiration:  Adequate for speech production  Quality:   Could not test  Intensity: Could not test    Pain: No pain reported. EDUCATION:   The Speech Language Pathologist (SLP) completed education regarding results of evaluation and that intervention is not warranted at this time. Learner: Patient  Education: Reviewed results and recommendations of this evaluation  Evaluation of Education:  No evidence of learning    This plan may be re-evaluated and revised as warranted. Evaluation Time includes thorough review of current medical information, gathering information on past medical history/social history and prior level of function, completion of standardized testing/informal observation of tasks, assessment of data and education on plan of care and goals. [x]The admitting diagnosis and active problem list, have been reviewed prior to initiation of this evaluation.     CPT Code: 25860  dysphagia study    ACTIVE PROBLEM LIST:   Patient Active Problem List   Diagnosis    Stage 4 chronic kidney disease ([address]    Anemia of chronic disease    Type 2 diabetes mellitus without complication, with long-term current use of insulin ([address]    Hypertension    Hyperlipidemia    Vascular dementia without behavioral disturbance ([address]    Situational depression    Vitamin D deficiency    Osteoarthritis    Overactive bladder    Prolonged Q-T interval on ECG    Acute congestive heart failure

## 2021-07-31 LAB
ALBUMIN SERPL-MCNC: 3.6 G/DL (ref 3.5–5.2)
ALP BLD-CCNC: 101 U/L (ref 35–104)
ALT SERPL-CCNC: 14 U/L (ref 0–32)
ANAEROBIC CULTURE: NORMAL
ANION GAP SERPL CALCULATED.3IONS-SCNC: 9 MMOL/L (ref 7–16)
AST SERPL-CCNC: 20 U/L (ref 0–31)
BASOPHILS ABSOLUTE: 0.06 E9/L (ref 0–0.2)
BASOPHILS RELATIVE PERCENT: 0.7 % (ref 0–2)
BILIRUB SERPL-MCNC: 0.5 MG/DL (ref 0–1.2)
BUN BLDV-MCNC: 30 MG/DL (ref 6–23)
CALCIUM SERPL-MCNC: 9.2 MG/DL (ref 8.6–10.2)
CHLORIDE BLD-SCNC: 96 MMOL/L (ref 98–107)
CO2: 37 MMOL/L (ref 22–29)
CREAT SERPL-MCNC: 1.9 MG/DL (ref 0.5–1)
EOSINOPHILS ABSOLUTE: 0.64 E9/L (ref 0.05–0.5)
EOSINOPHILS RELATIVE PERCENT: 7.1 % (ref 0–6)
GFR AFRICAN AMERICAN: 30
GFR NON-AFRICAN AMERICAN: 25 ML/MIN/1.73
GLUCOSE BLD-MCNC: 63 MG/DL (ref 74–99)
HCT VFR BLD CALC: 32.7 % (ref 34–48)
HEMOGLOBIN: 9.7 G/DL (ref 11.5–15.5)
IMMATURE GRANULOCYTES #: 0.06 E9/L
IMMATURE GRANULOCYTES %: 0.7 % (ref 0–5)
LYMPHOCYTES ABSOLUTE: 1.26 E9/L (ref 1.5–4)
LYMPHOCYTES RELATIVE PERCENT: 13.9 % (ref 20–42)
MCH RBC QN AUTO: 28.8 PG (ref 26–35)
MCHC RBC AUTO-ENTMCNC: 29.7 % (ref 32–34.5)
MCV RBC AUTO: 97 FL (ref 80–99.9)
METER GLUCOSE: 198 MG/DL (ref 74–99)
METER GLUCOSE: 261 MG/DL (ref 74–99)
METER GLUCOSE: 303 MG/DL (ref 74–99)
METER GLUCOSE: 73 MG/DL (ref 74–99)
MONOCYTES ABSOLUTE: 0.98 E9/L (ref 0.1–0.95)
MONOCYTES RELATIVE PERCENT: 10.8 % (ref 2–12)
NEUTROPHILS ABSOLUTE: 6.04 E9/L (ref 1.8–7.3)
NEUTROPHILS RELATIVE PERCENT: 66.8 % (ref 43–80)
PDW BLD-RTO: 16.5 FL (ref 11.5–15)
PLATELET # BLD: 232 E9/L (ref 130–450)
PMV BLD AUTO: 11 FL (ref 7–12)
POTASSIUM SERPL-SCNC: 4.1 MMOL/L (ref 3.5–5)
RBC # BLD: 3.37 E12/L (ref 3.5–5.5)
REPORT: NORMAL
SODIUM BLD-SCNC: 142 MMOL/L (ref 132–146)
THIS TEST SENT TO: NORMAL
TOTAL PROTEIN: 6.2 G/DL (ref 6.4–8.3)
WBC # BLD: 9 E9/L (ref 4.5–11.5)

## 2021-07-31 PROCEDURE — 6370000000 HC RX 637 (ALT 250 FOR IP): Performed by: NURSE PRACTITIONER

## 2021-07-31 PROCEDURE — 6360000002 HC RX W HCPCS: Performed by: INTERNAL MEDICINE

## 2021-07-31 PROCEDURE — 82962 GLUCOSE BLOOD TEST: CPT

## 2021-07-31 PROCEDURE — 36415 COLL VENOUS BLD VENIPUNCTURE: CPT

## 2021-07-31 PROCEDURE — 6370000000 HC RX 637 (ALT 250 FOR IP): Performed by: INTERNAL MEDICINE

## 2021-07-31 PROCEDURE — 2140000000 HC CCU INTERMEDIATE R&B

## 2021-07-31 PROCEDURE — 2580000003 HC RX 258: Performed by: INTERNAL MEDICINE

## 2021-07-31 PROCEDURE — 2700000000 HC OXYGEN THERAPY PER DAY

## 2021-07-31 PROCEDURE — 80053 COMPREHEN METABOLIC PANEL: CPT

## 2021-07-31 PROCEDURE — 85025 COMPLETE CBC W/AUTO DIFF WBC: CPT

## 2021-07-31 RX ADMIN — ENOXAPARIN SODIUM 30 MG: 30 INJECTION SUBCUTANEOUS at 20:30

## 2021-07-31 RX ADMIN — DOXYCYCLINE HYCLATE 100 MG: 100 CAPSULE ORAL at 20:31

## 2021-07-31 RX ADMIN — INSULIN LISPRO 2 UNITS: 100 INJECTION, SOLUTION INTRAVENOUS; SUBCUTANEOUS at 20:23

## 2021-07-31 RX ADMIN — MIRTAZAPINE 15 MG: 15 TABLET, FILM COATED ORAL at 20:31

## 2021-07-31 RX ADMIN — Medication 10 ML: at 08:25

## 2021-07-31 RX ADMIN — FUROSEMIDE 20 MG: 20 TABLET ORAL at 08:25

## 2021-07-31 RX ADMIN — LEVETIRACETAM 250 MG: 500 TABLET ORAL at 06:30

## 2021-07-31 RX ADMIN — INSULIN LISPRO 3 UNITS: 100 INJECTION, SOLUTION INTRAVENOUS; SUBCUTANEOUS at 17:25

## 2021-07-31 RX ADMIN — INSULIN GLARGINE 12 UNITS: 100 INJECTION, SOLUTION SUBCUTANEOUS at 20:30

## 2021-07-31 RX ADMIN — ASPIRIN 81 MG: 81 TABLET, CHEWABLE ORAL at 08:25

## 2021-07-31 RX ADMIN — DOXYCYCLINE HYCLATE 100 MG: 100 CAPSULE ORAL at 08:25

## 2021-07-31 RX ADMIN — FUROSEMIDE 20 MG: 20 TABLET ORAL at 17:26

## 2021-07-31 RX ADMIN — Medication 10 ML: at 20:31

## 2021-07-31 RX ADMIN — INSULIN LISPRO 1 UNITS: 100 INJECTION, SOLUTION INTRAVENOUS; SUBCUTANEOUS at 11:56

## 2021-07-31 ASSESSMENT — PAIN SCALES - GENERAL
PAINLEVEL_OUTOF10: 0

## 2021-07-31 NOTE — PATIENT CARE CONFERENCE
P Quality Flow/Interdisciplinary Rounds Progress Note        Quality Flow Rounds held on July 31, 2021    Disciplines Attending:  Bedside nurse and charge nurse    Eriberto Chaves was admitted on 7/23/2021 11:33 AM    Anticipated Discharge Date:  Expected Discharge Date: 07/30/21    Disposition:    Rigo Score:  Rigo Scale Score: 12    Readmission Risk              Risk of Unplanned Readmission:  25           Discussed patient goal for the day, patient clinical progression, and barriers to discharge.   The following Goal(s) of the Day/Commitment(s) have been identified:  keep patient and family informed      Freddy Hunter RN  July 31, 2021

## 2021-07-31 NOTE — PROGRESS NOTES
Guevara Barber M.D.,[facility]  Kerline Bnod D.O., F.A.C.O.I., Joana Smith M.D. Maricruz Donahue M.D., Dolores Rader M.D. Erlinda Arriaga D.O. Daily Pulmonary Progress Note    Patient:  Taqueria Boyce 80 y.o. female MRN: [id number]     Date of Service: 7/31/2021        Subjective      Patient was seen and examined lying in bed in no apparent distress. She is much more alert today. Reports that she had breakfast and swallowed pills fine. She is currently on room air.     24-hour events:  None    Objective   Vitals: BP (!) 121/53   Pulse 76   Temp 98.9 °F (37.2 °C) (Temporal)   Resp 16   Ht 5' 4\" (1.626 m)   Wt 151 lb 14.4 oz (68.9 kg)   SpO2 94%   BMI 26.07 kg/m²     I/O:    Intake/Output Summary (Last 24 hours) at 7/31/2021 1135  Last data filed at 7/31/2021 4918  Gross per 24 hour   Intake 240 ml   Output 1500 ml   Net -1260 ml       Vent Information  Skin Assessment: Clean, dry, & intact  SpO2: 94 %                CURRENT MEDS :  Scheduled Meds:   insulin glargine  12 Units Subcutaneous Nightly    insulin lispro  0-6 Units Subcutaneous TID WC    insulin lispro  0-3 Units Subcutaneous Nightly    doxycycline hyclate  100 mg Oral 2 times per day    furosemide  20 mg Oral BID    aspirin  81 mg Oral Daily    levETIRAcetam  250 mg Oral QAM AC    mirtazapine  15 mg Oral Nightly    sodium chloride flush  5-40 mL Intravenous 2 times per day    enoxaparin  30 mg Subcutaneous Daily       Physical Exam:  General: Lying in bed comfortably, no distress, breathing is not labored  HEENT: PERRL, EOMI, MMM, no oral lesions  Neck: supple, no adenopathy  CV: RRR without murmur  Lungs: clear to auscultation bilaterally without wheezing, no crackles, decreased at the lung bases   Abd: soft, NT, ND, bowel sounds normal  Ext: warm, no edema, no clubbing  Skin: no rashes        Pertinent/ New Labs and Imaging Studies     Imaging Personally Reviewed:    7/30  Interval development of

## 2021-07-31 NOTE — PROGRESS NOTES
Phone call received by [facility] to ask if patient will be returning tonight and  stating that she was able to just \"come back\". After reviewing patient's chart, it looks like she is ready to be discharged, however, we were under the impression that we needed a precert. I returned call to [facility], and the new person Walter Simmons that I spoke to did state that the patient would in fact need a precert to return. Breanne Aguila RN

## 2021-07-31 NOTE — PLAN OF CARE
Problem: Confusion - Acute:  Goal: Absence of continued neurological deterioration signs and symptoms  Description: Absence of continued neurological deterioration signs and symptoms  Outcome: Met This Shift     Problem: Injury - Risk of, Physical Injury:  Goal: Absence of physical injury  Description: Absence of physical injury  Outcome: Met This Shift     Problem: Mood - Altered:  Goal: Mood stable  Description: Mood stable  Outcome: Met This Shift     Problem: Sensory Perception - Impaired:  Goal: Demonstrations of improved sensory functioning will increase  Description: Demonstrations of improved sensory functioning will increase  Outcome: Met This Shift

## 2021-07-31 NOTE — PROGRESS NOTES
Department of Internal Medicine  Nephrology Progress Note    Events reviewed. SUBJECTIVE:  We are following MsNic Giles for ANGELA on CKD. Reports no complaints. PHYSICAL EXAM:      Vitals:    VITALS:  BP (!) 121/53   Pulse 76   Temp 98.9 °F (37.2 °C) (Temporal)   Resp 16   Ht 5' 4\" (1.626 m)   Wt 151 lb 14.4 oz (68.9 kg)   SpO2 94%   BMI 26.07 kg/m²   24HR INTAKE/OUTPUT:      Intake/Output Summary (Last 24 hours) at 7/31/2021 1507  Last data filed at 7/31/2021 1441  Gross per 24 hour   Intake 420 ml   Output 1800 ml   Net -1380 ml       Constitutional:  Alert, oriented to person only  HEENT:  Normocephalic and atraumatic. Respiratory:  Diminished. On 8L HFNC  Cardiovascular/Edema:  Normal rate and regular rhythm. Gastrointestinal:  Soft, nontender, + Bowel sounds  Neurologic:  She is alert and oriented to person, place, and time. Skin:  Skin is warm and dry. Bilateral lower extremity anterior tibial wounds    Scheduled Meds:   insulin glargine  12 Units Subcutaneous Nightly    insulin lispro  0-6 Units Subcutaneous TID WC    insulin lispro  0-3 Units Subcutaneous Nightly    doxycycline hyclate  100 mg Oral 2 times per day    furosemide  20 mg Oral BID    aspirin  81 mg Oral Daily    levETIRAcetam  250 mg Oral QAM AC    mirtazapine  15 mg Oral Nightly    sodium chloride flush  5-40 mL Intravenous 2 times per day    enoxaparin  30 mg Subcutaneous Daily     Continuous Infusions:   sodium chloride      dextrose       PRN Meds:. LORazepam, perflutren lipid microspheres, ipratropium-albuterol, sodium chloride flush, sodium chloride, polyethylene glycol, acetaminophen **OR** acetaminophen, glucose, dextrose, glucagon (rDNA), dextrose    DATA:    CBC:   Lab Results   Component Value Date    WBC 9.0 07/31/2021    RBC 3.37 07/31/2021    HGB 9.7 07/31/2021    HCT 32.7 07/31/2021    MCV 97.0 07/31/2021    MCH 28.8 07/31/2021    MCHC 29.7 07/31/2021    RDW 16.5 07/31/2021     07/31/2021    MPV 11.0 07/31/2021     CMP:    Lab Results   Component Value Date     07/31/2021    K 4.1 07/31/2021    K 5.1 07/24/2021    CL 96 07/31/2021    CO2 37 07/31/2021    BUN 30 07/31/2021    CREATININE 1.9 07/31/2021    GFRAA 30 07/31/2021    LABGLOM 25 07/31/2021    LABGLOM 33 03/28/2018    GLUCOSE 63 07/31/2021    GLUCOSE 263 03/28/2018    PROT 6.2 07/31/2021    LABALBU 3.6 07/31/2021    LABALBU 3.0 03/28/2018    CALCIUM 9.2 07/31/2021    BILITOT 0.5 07/31/2021    BILITOT NEGATIVE 10/03/2017    ALKPHOS 101 07/31/2021    AST 20 07/31/2021    ALT 14 07/31/2021     Magnesium:    Lab Results   Component Value Date    MG 1.9 07/27/2021     Phosphorus:    Lab Results   Component Value Date    PHOS 3.2 07/27/2021     Urine Studies:   Results for Cosmo Cerna (MRN [id number]) as of 7/25/2021 10:02   Ref. Range 7/24/2021 15:40 7/24/2021 15:40   Creatinine, Ur Latest Ref Range: 29 - 226 mg/dL 27 (L) 27 (L)   Microalbumin Creatinine Ratio Latest Ref Range: 0.0 - 30.0   58.1 (H)   Microalbumin, Random Urine Latest Ref Range: Not Established mg/L  15.7   Osmolality, Ur Latest Ref Range: 300 - 900 mOsm/kg 347    Protein, Ur Latest Ref Range: 0 - 12 mg/dL 5    Protein/Creat Ratio Latest Ref Range: 0.0 - 0.2  0.2 0.2   Urea Nitrogen, Ur Latest Ref Range: 800 - 1666 mg/dL 255 (L)          Radiology Review:      CT Head 7/23/21   1.  Slightly limited exam, grossly negative for acute intracranial process,   within the limits of this non-contrast CT exam.       2.  Sequela of atherosclerotic arterial and chronic microvascular ischemic   disease, as described.       3.  Age-appropriate, generalized parenchymal volume loss.       4.  Paranasal sinus, left mastoid air cell complex, and bilateral external   auditory canal disease, as described.  The right mastoid air cell complex is   moderately/severely hypoplastic.       5.  Chronic osseous findings, as described     CT chest 7/23/21   1.  Moderate pleural effusions, right larger than left.  Left pleural effusion   is partially loculated. 2. Right lower lobe consolidation and volume loss, likely atelectasis   although superimposed pneumonia/infiltrate not excluded. 3. Mild interstitial thickening probably represents mild interstitial edema. Renal US 7/24/21   Bilateral renal cortical thinning which suggest changes related to chronic   underlying medical renal disease.  No hydronephrosis.       The bladder was not distended for this exam and could not be evaluated. CXR 7/25/2021   1.  Interval slight increase in the right greater than left pleural effusions   and mid to lower lung opacities.  No large pneumothorax.       2.  Atherosclerotic disease, cardiomegaly, and central pulmonary vascular   congestion are unchanged. BRIEF SUMMARY OF INITIAL CONSULT:    Briefly, Mrs. Venkat Harding is a 80year old female with a past medical history of CKD Stage IV baseline creatinine 2.5-2.7mg/dL, HTN, Type 2 DM, HFpEF 55-60% with a stage II DD, dementia, hyperlipidemia, anemia, recent admission with pneumonia, who presented to the ER on 7/23/21 shortness of breath. Upon arrival to the ER found to be hyperkalemic, potassium: 5.9 mmol/L, as well as with worsening renal function BUN 81, Creatinine 3.1 mg/dL, reason for this consultation. . Chest Xray was consistent with CHF exacerbation. Problems resolved:  · Hyperkalemia, S/P hyperkalemic protocol, s/p Lokelma  · Hypernatremia   · ANGELA stage I on CKD, likely hemodynamically mediated pre-renal ANGELA secondary to cardiorenal syndrome, non complaint with medications at skilled nursing facility. Nonoliguric. Renal function continues to improve better than baseline with diuretic administration, creatinine 1.8 mg/dl.   · Hypokalemia, secondary to poor oral intake and use of diuretics, for replacement    IMPRESSION/RECOMMENDATIONS:      1. CKD stage IV, with mild proteinuria, urine ACR 58.1, baseline creatinine 1.8-1.9 mg/dL, secondary to hypertensive nephrosclerosis. Renal function remains a stable last 72 hours with adequate urine output. 2. Elevated bicarbonate level, contraction alkalosis versus chronic respiratory acidosis, obtain venous pH for diagnosis    3. Acidemia, (venous pH 7.33, estimated arterial pH:7.37), with respiratory acidosis and metabolic alkalosis  4. HTN, BP medications on hold   5. HFpEF 55-60% with a stage II DD, pro-BNP 7,692>>8,190, on Lasix  ------------------------------------------------  6. Acute respiratory failure, secondary to # 4. Requiring high levels of supplemental oxygen, 7 L HFNC. 7. Right pleural effusion, s/p thoracentesis  8. New onset PAF with multiple pauses, metoprolol on hold   9. Low grade temp, on doxycycline  10. Anemia: Iron 39 mcg/dL, Iron Saturation 16%, on IV iron.       Plan:    · Continue lasix 20 mg po twice daily  · Continue to monitor renal function  · Monitor bicarbonate levels  · Continue IV iron   · Encourage fluid intake      Electronically signed by Pro Keita MD on 7/31/2021 at 3:07 PM

## 2021-07-31 NOTE — PROGRESS NOTES
Subjective: The patient is awake and at baseline mentation, somewhat aphasic and difficult to communicate  No acute complaints  Appears chronically ill  Hallucinating today-likely baseline mentation currently  Objective:    BP (!) 121/53   Pulse 76   Temp 98.9 °F (37.2 °C) (Temporal)   Resp 16   Ht 5' 4\" (1.626 m)   Wt 151 lb 14.4 oz (68.9 kg)   SpO2 94%   BMI 26.07 kg/m²     In: 240 [P.O.:240]  Out: 1500   In: 240   Out: 1500 [Urine:1500]    General appearance: NAD, conversant but confused  HEENT: AT/NC, MMM  Neck: FROM, supple  Lungs: Clear to auscultation  CV: RRR, no MRGs  Vasc: Radial pulses 2+  Abdomen: Soft, non-tender; no masses or HSM  Extremities: Swelling bilateral lower extremity  Skin: no rash, lesions or ulcers  Psych: Alert and oriented to person, place and time  Neuro: Alert and interactive     Recent Labs     07/29/21  0559 07/30/21  0428 07/31/21  0548   WBC 10.5 8.9 9.0   HGB 9.7* 8.7* 9.7*   HCT 32.2* 29.6* 32.7*    201 232       Recent Labs     07/29/21  0559 07/30/21  0428 07/31/21  0548    139 142   K 3.5 4.0 4.1   CL 95* 95* 96*   CO2 39* 39* 37*   BUN 36* 31* 30*   CREATININE 1.8* 1.8* 1.9*   CALCIUM 8.6 8.6 9.2       Assessment:    Principal Problem:    Acute congestive heart failure (HCC)  Active Problems:    Stage 4 chronic kidney disease (HCC)    Type 2 diabetes mellitus without complication, with long-term current use of insulin (HCC)    Hypertension    Vascular dementia without behavioral disturbance (HCC)    Prolonged Q-T interval on ECG    Acute respiratory failure with hypoxia (HCC)    Acute kidney injury superimposed on CKD (HCC)    Paroxysmal atrial fibrillation (HCC)  Resolved Problems:    * No resolved hospital problems.  *      Plan:    Admitted to telemetry for evaluation of acute congestive heart failure in a patient with known history of chronic kidney disease and multiple comorbidities  IV diuresis, patient remains asymptomatic with A. fib ranging from 100-1 20s-no plans for pacemaker  Status post left thoracentesis by interventional radiology 7/26/2021  Renal following for chronic renal insufficiency-back at baseline  Pulmonology on board for acute on chronic respiratory failure-have signed off, okay for discharge  Electrophysiology/general cardiology following for new onset atrial fibrillation with multiple pauses-no pacemaker planned-cardiology and EP have signed off  Palliative care following, consideration being given to palliation/hospice  Echocardiogram is still pending okay to have done as an outpatient  discontinue IV antibiotics on discharge -No fevers  Decrease Lantus insulin to 12 units and change sliding scale to low-dose  Check labs today-essentially at baseline, BUN/creatinine improved  Continue to monitor labs/electrolytes while in-house      DVT Prophylaxis   PT/OT  Discharge planning--Per case management, family has no immediate plans for hospice.   We will plan discharge once authorization obtained per facility-still pending apparently          Conchis Lawrence MD  2:13 PM  7/31/2021

## 2021-08-01 LAB
ALBUMIN SERPL-MCNC: 3.5 G/DL (ref 3.5–5.2)
ALP BLD-CCNC: 102 U/L (ref 35–104)
ALT SERPL-CCNC: 17 U/L (ref 0–32)
ANION GAP SERPL CALCULATED.3IONS-SCNC: 12 MMOL/L (ref 7–16)
ANION GAP SERPL CALCULATED.3IONS-SCNC: 2 MMOL/L (ref 7–16)
AST SERPL-CCNC: 21 U/L (ref 0–31)
BASOPHILS ABSOLUTE: 0.04 E9/L (ref 0–0.2)
BASOPHILS RELATIVE PERCENT: 0.4 % (ref 0–2)
BILIRUB SERPL-MCNC: 0.4 MG/DL (ref 0–1.2)
BUN BLDV-MCNC: 39 MG/DL (ref 6–23)
BUN BLDV-MCNC: 43 MG/DL (ref 6–23)
CALCIUM SERPL-MCNC: 9 MG/DL (ref 8.6–10.2)
CALCIUM SERPL-MCNC: 9.2 MG/DL (ref 8.6–10.2)
CHLORIDE BLD-SCNC: 97 MMOL/L (ref 98–107)
CHLORIDE BLD-SCNC: 99 MMOL/L (ref 98–107)
CO2: 33 MMOL/L (ref 22–29)
CO2: 40 MMOL/L (ref 22–29)
CREAT SERPL-MCNC: 2.1 MG/DL (ref 0.5–1)
CREAT SERPL-MCNC: 2.4 MG/DL (ref 0.5–1)
EOSINOPHILS ABSOLUTE: 0.41 E9/L (ref 0.05–0.5)
EOSINOPHILS RELATIVE PERCENT: 3.9 % (ref 0–6)
GFR AFRICAN AMERICAN: 23
GFR AFRICAN AMERICAN: 26
GFR NON-AFRICAN AMERICAN: 19 ML/MIN/1.73
GFR NON-AFRICAN AMERICAN: 22 ML/MIN/1.73
GLUCOSE BLD-MCNC: 131 MG/DL (ref 74–99)
GLUCOSE BLD-MCNC: 187 MG/DL (ref 74–99)
HCT VFR BLD CALC: 34.5 % (ref 34–48)
HEMOGLOBIN: 10.1 G/DL (ref 11.5–15.5)
IMMATURE GRANULOCYTES #: 0.07 E9/L
IMMATURE GRANULOCYTES %: 0.7 % (ref 0–5)
LYMPHOCYTES ABSOLUTE: 1.86 E9/L (ref 1.5–4)
LYMPHOCYTES RELATIVE PERCENT: 17.5 % (ref 20–42)
MCH RBC QN AUTO: 28.2 PG (ref 26–35)
MCHC RBC AUTO-ENTMCNC: 29.3 % (ref 32–34.5)
MCV RBC AUTO: 96.4 FL (ref 80–99.9)
METER GLUCOSE: 132 MG/DL (ref 74–99)
METER GLUCOSE: 181 MG/DL (ref 74–99)
METER GLUCOSE: 260 MG/DL (ref 74–99)
METER GLUCOSE: 261 MG/DL (ref 74–99)
MONOCYTES ABSOLUTE: 1.32 E9/L (ref 0.1–0.95)
MONOCYTES RELATIVE PERCENT: 12.4 % (ref 2–12)
NEUTROPHILS ABSOLUTE: 6.91 E9/L (ref 1.8–7.3)
NEUTROPHILS RELATIVE PERCENT: 65.1 % (ref 43–80)
PDW BLD-RTO: 16.6 FL (ref 11.5–15)
PLATELET # BLD: 253 E9/L (ref 130–450)
PMV BLD AUTO: 11.3 FL (ref 7–12)
POTASSIUM SERPL-SCNC: 3.9 MMOL/L (ref 3.5–5)
POTASSIUM SERPL-SCNC: 4.3 MMOL/L (ref 3.5–5)
PRO-BNP: 9914 PG/ML (ref 0–450)
RBC # BLD: 3.58 E12/L (ref 3.5–5.5)
SODIUM BLD-SCNC: 141 MMOL/L (ref 132–146)
SODIUM BLD-SCNC: 142 MMOL/L (ref 132–146)
TOTAL PROTEIN: 6.2 G/DL (ref 6.4–8.3)
WBC # BLD: 10.6 E9/L (ref 4.5–11.5)

## 2021-08-01 PROCEDURE — 80048 BASIC METABOLIC PNL TOTAL CA: CPT

## 2021-08-01 PROCEDURE — 2140000000 HC CCU INTERMEDIATE R&B

## 2021-08-01 PROCEDURE — 6360000002 HC RX W HCPCS: Performed by: INTERNAL MEDICINE

## 2021-08-01 PROCEDURE — 97530 THERAPEUTIC ACTIVITIES: CPT

## 2021-08-01 PROCEDURE — 6370000000 HC RX 637 (ALT 250 FOR IP): Performed by: INTERNAL MEDICINE

## 2021-08-01 PROCEDURE — 97535 SELF CARE MNGMENT TRAINING: CPT

## 2021-08-01 PROCEDURE — 83880 ASSAY OF NATRIURETIC PEPTIDE: CPT

## 2021-08-01 PROCEDURE — 2700000000 HC OXYGEN THERAPY PER DAY

## 2021-08-01 PROCEDURE — 6370000000 HC RX 637 (ALT 250 FOR IP): Performed by: NURSE PRACTITIONER

## 2021-08-01 PROCEDURE — 82962 GLUCOSE BLOOD TEST: CPT

## 2021-08-01 PROCEDURE — 85025 COMPLETE CBC W/AUTO DIFF WBC: CPT

## 2021-08-01 PROCEDURE — 2580000003 HC RX 258: Performed by: INTERNAL MEDICINE

## 2021-08-01 PROCEDURE — 80053 COMPREHEN METABOLIC PANEL: CPT

## 2021-08-01 PROCEDURE — 36415 COLL VENOUS BLD VENIPUNCTURE: CPT

## 2021-08-01 RX ADMIN — ENOXAPARIN SODIUM 30 MG: 30 INJECTION SUBCUTANEOUS at 20:09

## 2021-08-01 RX ADMIN — FUROSEMIDE 20 MG: 20 TABLET ORAL at 17:16

## 2021-08-01 RX ADMIN — DOXYCYCLINE HYCLATE 100 MG: 100 CAPSULE ORAL at 20:05

## 2021-08-01 RX ADMIN — DOXYCYCLINE HYCLATE 100 MG: 100 CAPSULE ORAL at 08:14

## 2021-08-01 RX ADMIN — INSULIN LISPRO 2 UNITS: 100 INJECTION, SOLUTION INTRAVENOUS; SUBCUTANEOUS at 20:09

## 2021-08-01 RX ADMIN — LEVETIRACETAM 250 MG: 500 TABLET ORAL at 06:15

## 2021-08-01 RX ADMIN — MIRTAZAPINE 15 MG: 15 TABLET, FILM COATED ORAL at 20:05

## 2021-08-01 RX ADMIN — ASPIRIN 81 MG: 81 TABLET, CHEWABLE ORAL at 08:15

## 2021-08-01 RX ADMIN — INSULIN LISPRO 3 UNITS: 100 INJECTION, SOLUTION INTRAVENOUS; SUBCUTANEOUS at 11:41

## 2021-08-01 RX ADMIN — INSULIN GLARGINE 12 UNITS: 100 INJECTION, SOLUTION SUBCUTANEOUS at 20:09

## 2021-08-01 RX ADMIN — INSULIN LISPRO 1 UNITS: 100 INJECTION, SOLUTION INTRAVENOUS; SUBCUTANEOUS at 08:15

## 2021-08-01 RX ADMIN — Medication 10 ML: at 08:15

## 2021-08-01 RX ADMIN — Medication 10 ML: at 20:08

## 2021-08-01 RX ADMIN — FUROSEMIDE 20 MG: 20 TABLET ORAL at 08:15

## 2021-08-01 ASSESSMENT — PAIN SCALES - GENERAL
PAINLEVEL_OUTOF10: 0

## 2021-08-01 NOTE — PROGRESS NOTES
Physical Therapy  Treatment Note    Name: Cem Giles  : 1926  MRN: [id number]      Date of Service: 2021    Evaluating PT:  Yarely Brannon, PT, DPBRONSON [id number]    Room #:  3207/1216-B  Diagnosis:  Acute congestive heart failure, unspecified heart failure type ([facility] [address] [I50.9]  PMHx/PSHx:  Dementia, HTN, Alzheimer's  Procedure/Surgery:  n/a  Precautions:  Falls, TSM, TAPS, Bed alarm  Equipment Needs:  TBD by facility    SUBJECTIVE:    Pt unreliable historian. Per chart, pt from SNF. Unclear PLOF, pt states she ambs with ww. OBJECTIVE:   Initial Evaluation  Date: 21 Treatment  Date: 2021 Short Term/ Long Term   Goals   AM-PAC 6 Clicks 02/77 15/32    Was pt agreeable to Eval/treatment? yes yes    Does pt have pain? Reports pain in LLE, does not quantify No c/o pain    Bed Mobility  Rolling: modA  Supine to sit: modA  Sit to supine: modA  Scooting: modA Rolling: Sharron  Supine to sit: Sharron  Sit to supine: NT  Scooting: Sharron Rolling: sup  Supine to sit: sup  Sit to supine: sup  Scooting: sup   Transfers Sit to stand: modA  Stand to sit: modA  Stand pivot: NT Sit<>stand: Sharron  Stand pivot: modA front [employer] Sit to stand: sup  Stand to sit: sup  Stand pivot: sup   Ambulation    NT 25 feet front [employer] modA 25', LRAD, sup   Stair negotiation: ascended and descended NT  n/a   ROM BUE:  Refer to OT note  BLE:  WFL     Strength BUE:  Refer to OT note  BLE:  Grossly 4/5  5/5   Balance Sitting EOB:  SBA-Sharron  Dynamic Standing:  Min-modA  Sitting EOB:  sup  Dynamic Standing:  sup     . Pt is A & O x 1 self only  Sensation:  Pt denies numbness and tingling to extremities  Edema:  unremarkable    Vitals:  SPO2 >95% on 3L throughout session    Patient education  Pt educated on role of PT intervention. Pt educated on safety in room with utilization of call light for assistance with mobility.   Pt educated on importance of maximizing OOB time by transferring to bedside chair for meals and ambulating to bathroom/transferring to bedside commode with assistance from nursing and therapy staff to increase functional activity tolerance and overall functional independence. Patient response to education:   Pt verbalized understanding Pt demonstrated skill Pt requires further education in this area   yes yes yes     ASSESSMENT:    Comments:  RN cleared pt for activity prior to session. Pt received supine in bed and agreeable to PT intervention with OT collaboration at this time. Pt performed all functional mobility as noted above. Pt with baseline cognitive impairments. 2 person assistance provided for safety. Max VC throughout for safety during ambulation in room. Pt incontinent of bowel during session. Able to ambulate to toilet to complete BM. Pt returned to seated in bedside chair at end of session and left with all needs met and call light in reach. Pt requires continued skilled PT intervention for the purposes of maximizing functional mobility and independence by addressing deficits described above. Treatment:  Patient practiced and was instructed in the following treatment:     Therapeutic Activities Completed:  o Functional mobility as noted above:   - Bed mobility: as noted above. Max VC and hand over hand guidance to facilitate efficient use of BUE on bed rail to promote more independent completion of task. - Transfer training: sit<>stand: Sharron from EOB, chair, and toilet. Max VC for proper hand placement and sequencing for safety. Stand pivot to toilet and bedside chair front [employer] modA. Pt unsteady when turning requiring modA for balance and [employer] management. Max VC for proper sequencing.    - Ambulation: 20 feet front [employer] modA x 2 reps. Unsafe use of WW throughout requiring max VC to correct.   R sided lean and gait deviation requiring max VC and modA to correct.   o Skilled repositioning in seated position for comfort.  o Pt education as noted above.  o Skilled vitals monitoring. PLAN:    Patient is making good progress towards established goals. Will continue with current POC.       Time in  0935  Time out  1000    Total Treatment Time  25 minutes     CPT codes:  [] Gait training 50747 0 minutes  [] Manual therapy 39056 0 minutes  [x] Therapeutic activities 44325 25 minutes  [] Therapeutic exercises 85698 0 minutes  [] Neuromuscular reeducation 47501 0 minutes    Chalo Feldman PT, DPT  [id number]

## 2021-08-01 NOTE — PROGRESS NOTES
asymptomatic with A. fib ranging from 100-1 20s-no plans for pacemaker  Status post left thoracentesis by interventional radiology 7/26/2021  Renal following for chronic renal insufficiency-back at baseline  Pulmonology on board for acute on chronic respiratory failure-have signed off, okay for discharge  Electrophysiology/general cardiology following for new onset atrial fibrillation with multiple pauses-no pacemaker planned-cardiology and EP have signed off  Palliative care following, consideration being given to palliation/hospice  Echocardiogram is still pending okay to have done as an outpatient  discontinue IV antibiotics on discharge -No fevers  Decrease Lantus insulin to 12 units and change sliding scale to low-dose  Check labs today-essentially at baseline, BUN/creatinine improved  Continue to monitor labs/electrolytes while in-house      DVT Prophylaxis   PT/OT  Discharge planning--Per case management, family has no immediate plans for hospice.   We will plan discharge once authorization obtained per facility-still pending apparently          Royal Eros MD  12:21 PM  8/1/2021

## 2021-08-01 NOTE — PROGRESS NOTES
OCCUPATIONAL THERAPY TREATMENT NOTE    [address]  [address] [place], [place]       Date:2021  Patient Name: Bettye Olivares  MRN: [id number]  : 1926  Room: [address] [place] [address]          Evaluating OT: Tim Martinez OTR/L   [id number]       Referring Dalia Corrigan MD    Specific Provider Orders/Date:OT eval and treat 2021       Diagnosis: CHF      Pertinent Medical History: Alzheimer's dementia, HTN, A fib,     Precautions:  Fall Risk, alarm, TSM, O2, "[employer]      Assessment of current deficits    [x] Functional mobility            [x]ADLs           [x] Strength                  [x]Cognition    [x] Functional transfers          [x] IADLs         [x] Safety Awareness   [x]Endurance    [] Fine Coordination                         [x] Balance      [] Vision/perception   []Sensation      []Gross Motor Coordination             [] ROM           [] Delirium                   [] Motor Control      OT PLAN OF CARE   OT POC based on physician orders, patient diagnosis and results of clinical assessment     Frequency/Duration  1-3 days/wk for 2 weeks PRN   Specific OT Treatment Interventions to include:   ADL retraining/adapted techniques and AE recommendations to increase functional independence within precautions                    Energy conservation techniques to improve tolerance for selfcare routine   Functional transfer/mobility training/DME recommendations for increased independence, safety and fall prevention         Patient/family education to increase safety and functional independence             Environmental modifications for safe mobility and completion of ADLs                             Therapeutic activity to improve functional performance during ADLs.                                          Therapeutic exercise to improve tolerance and functional strength for ADLs   Balance retraining/tolerance tasks for facilitation of postural control with dynamic challenges during ADLs . Positioning to improve functional independence     Recommended Adaptive Equipment: TBD      SOCIAL: per daughter - patient has been at [facility] for past 3 years (ambulating with with w/walker - for  past 3 months has been in [facility]. Using primarily w/c for mobility (patient does try to propel with her arms and legs) assist with ADLS. Pain Level:  No pain this session ;   Cognition: A&O: pleasant, following commands               Memory:  Forgetful               Sequencing:  Fair               Problem solving:  Fair               Judgement/safety:  Fair                 Functional Assessment:  AM-PAC Daily Activity Raw Score: 14/24    Initial Eval Status  Date: 7/27/21 Treatment Status  Date: 8/1/21 STGs = LTGs  Time frame: 10-14 days   Feeding SBA/Set-up  Supervision  Minimal intake noted, once seated in chair, pt wanting to eat applesauce Supervision    Grooming Mod A ,seated   Decrease standing balance/tolerance Mod A  Seated at EOB & chair with simple grooming tasks, required assist to initiate & then to sequence through task  SBA   UB Dressing Mod A  Mod A  Cues for sequencing through task, with gown  SBA    LB Dressing Max A  Max A  simulated Mod A    Bathing Max A  Max A  simulated Mod A    Toileting Assist with hygiene  Dep/Max A  Pt became incontinent of BM during mobility, walked into bathroom & was able to sit & complete BM on commode assist with hygiene  Mod A    Bed Mobility  Mod A  Supine <> sit  Min A    Min A   Functional Transfers Mod A  Sit-stand from bed  MOD A   sit<>stand   Stand pivot  from EOB to w/w v/c's for hand placement    Min/CGA    Functional Mobility Mod A,w/walker,O2  Side steps next to bed only   Decrease balance/endurance   No complaints of SOB  MOD A   with w/w less than house hold distances v/c's for walker safety  Min A  with good tolerance    Balance Sitting:     Static:  SBA- EOB     Dynamic:Mod A   Standing:  Mod A  Sitting: Static: SBA EOB   Dynamic: MIN A   Standing: MIN A with w/w   Dynamic: MOD A with w/w  Min A standing   Supervision - sitting    Activity Tolerance Fair - with light activity  Fair-    Good  with ADL activity    Visual/  Perceptual Glasses: none by bedside                       Comments: Upon arrival pt supine in bed, agreeable to therapy session. Pt educated with regards to bed mobility, functional tranfers, functional mobility, hand placement, walker safety, grooming tasks & toileting to improve safety awareness. At end of session pt seated in chair nursing aware, all lines and tubes intact, call light within reach. Pt has made fair progress towards set goals. Continue with current plan of care    Treatment Time In: 9:35           Treatment Time Out: 10:00              Treatment Charges: Mins Units   Ther Ex  40210     Manual Therapy 21444     Thera Activities 37562 10 1   ADL/Home Mgt 46145 15 1   Neuro Re-ed 96707     Group Therapy      Orthotic manage/training  76277     Non-Billable Time     Total Timed Treatment 25 2       [address], [place]/L [id number]  I have read the above note and agree with the documentation.   Reilly Bowers OTR/L [id number]

## 2021-08-01 NOTE — PROGRESS NOTES
08/01/2021    MPV 11.3 08/01/2021     CMP:    Lab Results   Component Value Date     08/01/2021    K 4.3 08/01/2021    K 5.1 07/24/2021    CL 99 08/01/2021    CO2 40 08/01/2021    BUN 39 08/01/2021    CREATININE 2.1 08/01/2021    GFRAA 26 08/01/2021    LABGLOM 22 08/01/2021    LABGLOM 33 03/28/2018    GLUCOSE 187 08/01/2021    GLUCOSE 263 03/28/2018    PROT 6.2 08/01/2021    LABALBU 3.5 08/01/2021    LABALBU 3.0 03/28/2018    CALCIUM 9.0 08/01/2021    BILITOT 0.4 08/01/2021    BILITOT NEGATIVE 10/03/2017    ALKPHOS 102 08/01/2021    AST 21 08/01/2021    ALT 17 08/01/2021     Magnesium:    Lab Results   Component Value Date    MG 1.9 07/27/2021     Phosphorus:    Lab Results   Component Value Date    PHOS 3.2 07/27/2021     Urine Studies:   Results for Roland Stewart (MRN [id number]) as of 7/25/2021 10:02   Ref. Range 7/24/2021 15:40 7/24/2021 15:40   Creatinine, Ur Latest Ref Range: 29 - 226 mg/dL 27 (L) 27 (L)   Microalbumin Creatinine Ratio Latest Ref Range: 0.0 - 30.0   58.1 (H)   Microalbumin, Random Urine Latest Ref Range: Not Established mg/L  15.7   Osmolality, Ur Latest Ref Range: 300 - 900 mOsm/kg 347    Protein, Ur Latest Ref Range: 0 - 12 mg/dL 5    Protein/Creat Ratio Latest Ref Range: 0.0 - 0.2  0.2 0.2   Urea Nitrogen, Ur Latest Ref Range: 800 - 1666 mg/dL 255 (L)          Radiology Review:      CT Head 7/23/21   1.  Slightly limited exam, grossly negative for acute intracranial process,   within the limits of this non-contrast CT exam.       2.  Sequela of atherosclerotic arterial and chronic microvascular ischemic   disease, as described.       3.  Age-appropriate, generalized parenchymal volume loss.       4.  Paranasal sinus, left mastoid air cell complex, and bilateral external   auditory canal disease, as described.  The right mastoid air cell complex is   moderately/severely hypoplastic.       5.  Chronic osseous findings, as described     CT chest 7/23/21   1.  Moderate pleural effusions, right larger than left.  Left pleural effusion   is partially loculated. 2. Right lower lobe consolidation and volume loss, likely atelectasis   although superimposed pneumonia/infiltrate not excluded. 3. Mild interstitial thickening probably represents mild interstitial edema. Renal US 7/24/21   Bilateral renal cortical thinning which suggest changes related to chronic   underlying medical renal disease.  No hydronephrosis.       The bladder was not distended for this exam and could not be evaluated. CXR 7/25/2021   1.  Interval slight increase in the right greater than left pleural effusions   and mid to lower lung opacities.  No large pneumothorax.       2.  Atherosclerotic disease, cardiomegaly, and central pulmonary vascular   congestion are unchanged. BRIEF SUMMARY OF INITIAL CONSULT:    Briefly, Mrs. Justin Menjivar is a 80year old female with a past medical history of CKD Stage IV baseline creatinine 2.5-2.7mg/dL, HTN, Type 2 DM, HFpEF 55-60% with a stage II DD, dementia, hyperlipidemia, anemia, recent admission with pneumonia, who presented to the ER on 7/23/21 shortness of breath. Upon arrival to the ER found to be hyperkalemic, potassium: 5.9 mmol/L, as well as with worsening renal function BUN 81, Creatinine 3.1 mg/dL, reason for this consultation. . Chest Xray was consistent with CHF exacerbation. Problems resolved:  · Hyperkalemia, S/P hyperkalemic protocol, s/p Lokelma  · Hypernatremia   · ANGELA stage I on CKD, likely hemodynamically mediated pre-renal ANGELA secondary to cardiorenal syndrome, non complaint with medications at skilled nursing facility. Nonoliguric. Renal function continues to improve better than baseline with diuretic administration, creatinine 1.8 mg/dl. · Hypokalemia, secondary to poor oral intake and use of diuretics, for replacement  · Acute respiratory failure, secondary to # 4.   Requiring high levels of supplemental oxygen

## 2021-08-01 NOTE — PATIENT CARE CONFERENCE
[facility] Quality Flow/Interdisciplinary Rounds Progress Note        Quality Flow Rounds held on August 1, 2021    Disciplines Attending:  Bedside nurse and charge nurse    Venkat Harding was admitted on 7/23/2021 11:33 AM    Anticipated Discharge Date:  Expected Discharge Date: 07/30/21    Disposition:    Rigo Score:  Rigo Scale Score: 12    Readmission Risk              Risk of Unplanned Readmission:  25           Discussed patient goal for the day, patient clinical progression, and barriers to discharge. The following Goal(s) of the Day/Commitment(s) have been identified:   To be able to assist with adl's and eat 50% of meals      Nadya Hazel RN  August 1, 2021

## 2021-08-02 LAB
ALBUMIN SERPL-MCNC: 3.4 G/DL (ref 3.5–5.2)
ALP BLD-CCNC: 103 U/L (ref 35–104)
ALT SERPL-CCNC: 26 U/L (ref 0–32)
ANION GAP SERPL CALCULATED.3IONS-SCNC: 13 MMOL/L (ref 7–16)
ANION GAP SERPL CALCULATED.3IONS-SCNC: 7 MMOL/L (ref 7–16)
AST SERPL-CCNC: 37 U/L (ref 0–31)
BASOPHILS ABSOLUTE: 0.07 E9/L (ref 0–0.2)
BASOPHILS RELATIVE PERCENT: 1 % (ref 0–2)
BILIRUB SERPL-MCNC: 0.3 MG/DL (ref 0–1.2)
BUN BLDV-MCNC: 17 MG/DL (ref 6–23)
BUN BLDV-MCNC: 49 MG/DL (ref 6–23)
CALCIUM SERPL-MCNC: 8.4 MG/DL (ref 8.6–10.2)
CALCIUM SERPL-MCNC: 9.3 MG/DL (ref 8.6–10.2)
CHLORIDE BLD-SCNC: 96 MMOL/L (ref 98–107)
CHLORIDE BLD-SCNC: 98 MMOL/L (ref 98–107)
CO2: 31 MMOL/L (ref 22–29)
CO2: 32 MMOL/L (ref 22–29)
CREAT SERPL-MCNC: 1 MG/DL (ref 0.5–1)
CREAT SERPL-MCNC: 2.7 MG/DL (ref 0.5–1)
EOSINOPHILS ABSOLUTE: 0.42 E9/L (ref 0.05–0.5)
EOSINOPHILS RELATIVE PERCENT: 5.8 % (ref 0–6)
GFR AFRICAN AMERICAN: 20
GFR AFRICAN AMERICAN: >60
GFR NON-AFRICAN AMERICAN: 16 ML/MIN/1.73
GFR NON-AFRICAN AMERICAN: 52 ML/MIN/1.73
GLUCOSE BLD-MCNC: 143 MG/DL (ref 74–99)
GLUCOSE BLD-MCNC: 153 MG/DL (ref 74–99)
HCT VFR BLD CALC: 35.3 % (ref 34–48)
HEMOGLOBIN: 10.4 G/DL (ref 11.5–15.5)
IMMATURE GRANULOCYTES #: 0.08 E9/L
IMMATURE GRANULOCYTES %: 1.1 % (ref 0–5)
LYMPHOCYTES ABSOLUTE: 1.81 E9/L (ref 1.5–4)
LYMPHOCYTES RELATIVE PERCENT: 24.9 % (ref 20–42)
MCH RBC QN AUTO: 28.6 PG (ref 26–35)
MCHC RBC AUTO-ENTMCNC: 29.5 % (ref 32–34.5)
MCV RBC AUTO: 97 FL (ref 80–99.9)
METER GLUCOSE: 162 MG/DL (ref 74–99)
METER GLUCOSE: 180 MG/DL (ref 74–99)
METER GLUCOSE: 289 MG/DL (ref 74–99)
METER GLUCOSE: 319 MG/DL (ref 74–99)
MONOCYTES ABSOLUTE: 0.81 E9/L (ref 0.1–0.95)
MONOCYTES RELATIVE PERCENT: 11.1 % (ref 2–12)
NEUTROPHILS ABSOLUTE: 4.09 E9/L (ref 1.8–7.3)
NEUTROPHILS RELATIVE PERCENT: 56.1 % (ref 43–80)
PDW BLD-RTO: 16.7 FL (ref 11.5–15)
PLATELET # BLD: 249 E9/L (ref 130–450)
PMV BLD AUTO: 11.2 FL (ref 7–12)
POTASSIUM SERPL-SCNC: 3.7 MMOL/L (ref 3.5–5)
POTASSIUM SERPL-SCNC: 4.3 MMOL/L (ref 3.5–5)
RBC # BLD: 3.64 E12/L (ref 3.5–5.5)
SODIUM BLD-SCNC: 136 MMOL/L (ref 132–146)
SODIUM BLD-SCNC: 141 MMOL/L (ref 132–146)
TOTAL PROTEIN: 6.4 G/DL (ref 6.4–8.3)
WBC # BLD: 7.3 E9/L (ref 4.5–11.5)

## 2021-08-02 PROCEDURE — 2580000003 HC RX 258: Performed by: INTERNAL MEDICINE

## 2021-08-02 PROCEDURE — 82962 GLUCOSE BLOOD TEST: CPT

## 2021-08-02 PROCEDURE — 6360000002 HC RX W HCPCS: Performed by: INTERNAL MEDICINE

## 2021-08-02 PROCEDURE — 80048 BASIC METABOLIC PNL TOTAL CA: CPT

## 2021-08-02 PROCEDURE — 6370000000 HC RX 637 (ALT 250 FOR IP): Performed by: INTERNAL MEDICINE

## 2021-08-02 PROCEDURE — 6370000000 HC RX 637 (ALT 250 FOR IP): Performed by: NURSE PRACTITIONER

## 2021-08-02 PROCEDURE — 36415 COLL VENOUS BLD VENIPUNCTURE: CPT

## 2021-08-02 PROCEDURE — 85025 COMPLETE CBC W/AUTO DIFF WBC: CPT

## 2021-08-02 PROCEDURE — 2140000000 HC CCU INTERMEDIATE R&B

## 2021-08-02 PROCEDURE — 80053 COMPREHEN METABOLIC PANEL: CPT

## 2021-08-02 PROCEDURE — 2700000000 HC OXYGEN THERAPY PER DAY

## 2021-08-02 PROCEDURE — 51798 US URINE CAPACITY MEASURE: CPT

## 2021-08-02 RX ORDER — SODIUM CHLORIDE 9 MG/ML
INJECTION, SOLUTION INTRAVENOUS CONTINUOUS
Status: DISCONTINUED | OUTPATIENT
Start: 2021-08-02 | End: 2021-08-04

## 2021-08-02 RX ORDER — HEPARIN SODIUM 10000 [USP'U]/ML
5000 INJECTION, SOLUTION INTRAVENOUS; SUBCUTANEOUS EVERY 8 HOURS
Status: DISCONTINUED | OUTPATIENT
Start: 2021-08-02 | End: 2021-08-04 | Stop reason: HOSPADM

## 2021-08-02 RX ADMIN — DOXYCYCLINE HYCLATE 100 MG: 100 CAPSULE ORAL at 09:15

## 2021-08-02 RX ADMIN — Medication 10 ML: at 20:51

## 2021-08-02 RX ADMIN — INSULIN GLARGINE 12 UNITS: 100 INJECTION, SOLUTION SUBCUTANEOUS at 20:51

## 2021-08-02 RX ADMIN — INSULIN LISPRO 2 UNITS: 100 INJECTION, SOLUTION INTRAVENOUS; SUBCUTANEOUS at 20:51

## 2021-08-02 RX ADMIN — HEPARIN SODIUM 5000 UNITS: 10000 INJECTION INTRAVENOUS; SUBCUTANEOUS at 22:22

## 2021-08-02 RX ADMIN — DOXYCYCLINE HYCLATE 100 MG: 100 CAPSULE ORAL at 20:51

## 2021-08-02 RX ADMIN — INSULIN LISPRO 4 UNITS: 100 INJECTION, SOLUTION INTRAVENOUS; SUBCUTANEOUS at 16:38

## 2021-08-02 RX ADMIN — Medication 10 ML: at 09:16

## 2021-08-02 RX ADMIN — INSULIN LISPRO 1 UNITS: 100 INJECTION, SOLUTION INTRAVENOUS; SUBCUTANEOUS at 09:16

## 2021-08-02 RX ADMIN — FUROSEMIDE 20 MG: 20 TABLET ORAL at 09:15

## 2021-08-02 RX ADMIN — INSULIN LISPRO 1 UNITS: 100 INJECTION, SOLUTION INTRAVENOUS; SUBCUTANEOUS at 11:33

## 2021-08-02 RX ADMIN — ASPIRIN 81 MG: 81 TABLET, CHEWABLE ORAL at 09:15

## 2021-08-02 RX ADMIN — SODIUM CHLORIDE: 9 INJECTION, SOLUTION INTRAVENOUS at 11:47

## 2021-08-02 RX ADMIN — LEVETIRACETAM 250 MG: 500 TABLET ORAL at 06:47

## 2021-08-02 RX ADMIN — MIRTAZAPINE 15 MG: 15 TABLET, FILM COATED ORAL at 20:51

## 2021-08-02 ASSESSMENT — PAIN SCALES - GENERAL
PAINLEVEL_OUTOF10: 0

## 2021-08-02 NOTE — PROGRESS NOTES
Department of Internal Medicine  Nephrology Progress Note    Events reviewed. SUBJECTIVE:  We are following Ms. Viraj Meier for ANGELA on CKD. Reports no complaints. PHYSICAL EXAM:      Vitals:    VITALS:  BP (!) 108/54   Pulse 69   Temp 97.6 °F (36.4 °C) (Temporal)   Resp 18   Ht 5' 4\" (1.626 m)   Wt 159 lb 12.8 oz (72.5 kg)   SpO2 95%   BMI 27.43 kg/m²   24HR INTAKE/OUTPUT:      Intake/Output Summary (Last 24 hours) at 8/2/2021 1025  Last data filed at 8/2/2021 0326  Gross per 24 hour   Intake 0 ml   Output 350 ml   Net -350 ml       Constitutional:  Alert, oriented to person only  HEENT:  Normocephalic and atraumatic. Respiratory:  Diminished. On 3L NC  Cardiovascular/Edema:  Normal rate and regular rhythm. Gastrointestinal:  Soft, nontender, + Bowel sounds  Neurologic:  She is alert and oriented to person, place, and time. Skin:  Skin is warm and dry. Bilateral lower extremity anterior tibial wounds    Scheduled Meds:   heparin (porcine)  5,000 Units Subcutaneous Q8H    insulin glargine  12 Units Subcutaneous Nightly    insulin lispro  0-6 Units Subcutaneous TID WC    insulin lispro  0-3 Units Subcutaneous Nightly    doxycycline hyclate  100 mg Oral 2 times per day    furosemide  20 mg Oral BID    aspirin  81 mg Oral Daily    levETIRAcetam  250 mg Oral QAM AC    mirtazapine  15 mg Oral Nightly    sodium chloride flush  5-40 mL Intravenous 2 times per day     Continuous Infusions:   sodium chloride      dextrose       PRN Meds:. LORazepam, perflutren lipid microspheres, ipratropium-albuterol, sodium chloride flush, sodium chloride, polyethylene glycol, acetaminophen **OR** acetaminophen, glucose, dextrose, glucagon (rDNA), dextrose    DATA:    CBC:   Lab Results   Component Value Date    WBC 7.3 08/02/2021    RBC 3.64 08/02/2021    HGB 10.4 08/02/2021    HCT 35.3 08/02/2021    MCV 97.0 08/02/2021    MCH 28.6 08/02/2021    MCHC 29.5 08/02/2021    RDW 16.7 08/02/2021     08/02/2021    MPV 11.2 08/02/2021     CMP:    Lab Results   Component Value Date     08/02/2021    K 4.3 08/02/2021    K 5.1 07/24/2021    CL 96 08/02/2021    CO2 32 08/02/2021    BUN 49 08/02/2021    CREATININE 2.7 08/02/2021    GFRAA 20 08/02/2021    LABGLOM 16 08/02/2021    LABGLOM 33 03/28/2018    GLUCOSE 143 08/02/2021    GLUCOSE 263 03/28/2018    PROT 6.4 08/02/2021    LABALBU 3.4 08/02/2021    LABALBU 3.0 03/28/2018    CALCIUM 9.3 08/02/2021    BILITOT 0.3 08/02/2021    BILITOT NEGATIVE 10/03/2017    ALKPHOS 103 08/02/2021    AST 37 08/02/2021    ALT 26 08/02/2021     Magnesium:    Lab Results   Component Value Date    MG 1.9 07/27/2021     Phosphorus:    Lab Results   Component Value Date    PHOS 3.2 07/27/2021     Urine Studies:   Results for Ishmael Schrader (MRN [id number]) as of 7/25/2021 10:02   Ref. Range 7/24/2021 15:40 7/24/2021 15:40   Creatinine, Ur Latest Ref Range: 29 - 226 mg/dL 27 (L) 27 (L)   Microalbumin Creatinine Ratio Latest Ref Range: 0.0 - 30.0   58.1 (H)   Microalbumin, Random Urine Latest Ref Range: Not Established mg/L  15.7   Osmolality, Ur Latest Ref Range: 300 - 900 mOsm/kg 347    Protein, Ur Latest Ref Range: 0 - 12 mg/dL 5    Protein/Creat Ratio Latest Ref Range: 0.0 - 0.2  0.2 0.2   Urea Nitrogen, Ur Latest Ref Range: 800 - 1666 mg/dL 255 (L)          Radiology Review:      CT Head 7/23/21   1.  Slightly limited exam, grossly negative for acute intracranial process,   within the limits of this non-contrast CT exam.       2.  Sequela of atherosclerotic arterial and chronic microvascular ischemic   disease, as described.       3.  Age-appropriate, generalized parenchymal volume loss.       4.  Paranasal sinus, left mastoid air cell complex, and bilateral external   auditory canal disease, as described.  The right mastoid air cell complex is   moderately/severely hypoplastic.       5.  Chronic osseous findings, as described     CT chest 7/23/21   1.  Moderate pleural effusions, right larger than left.  Left pleural effusion   is partially loculated. 2. Right lower lobe consolidation and volume loss, likely atelectasis   although superimposed pneumonia/infiltrate not excluded. 3. Mild interstitial thickening probably represents mild interstitial edema. Renal US 7/24/21   Bilateral renal cortical thinning which suggest changes related to chronic   underlying medical renal disease.  No hydronephrosis.       The bladder was not distended for this exam and could not be evaluated. CXR 7/25/2021   1.  Interval slight increase in the right greater than left pleural effusions   and mid to lower lung opacities.  No large pneumothorax.       2.  Atherosclerotic disease, cardiomegaly, and central pulmonary vascular   congestion are unchanged. BRIEF SUMMARY OF INITIAL CONSULT:    Briefly, Mrs. Marilynn Rogers is a 80year old female with a past medical history of CKD Stage IV baseline creatinine 2.5-2.7mg/dL, HTN, Type 2 DM, HFpEF 55-60% with a stage II DD, dementia, hyperlipidemia, anemia, recent admission with pneumonia, who presented to the ER on 7/23/21 shortness of breath. Upon arrival to the ER found to be hyperkalemic, potassium: 5.9 mmol/L, as well as with worsening renal function BUN 81, Creatinine 3.1 mg/dL, reason for this consultation. . Chest Xray was consistent with CHF exacerbation. Problems resolved:  · Hyperkalemia, S/P hyperkalemic protocol, s/p Lokelma  · Hypernatremia   · ANGELA stage I on CKD, likely hemodynamically mediated pre-renal ANGELA secondary to cardiorenal syndrome, non complaint with medications at skilled nursing facility. Nonoliguric. Renal function continues to improve better than baseline with diuretic administration, creatinine 1.8 mg/dl. · Hypokalemia, secondary to poor oral intake and use of diuretics, for replacement  · Acute respiratory failure, secondary to # 4.   Requiring high levels of supplemental oxygen     IMPRESSION/RECOMMENDATIONS: 1. ANGELA on CKD, probably volume responsive prerenal ANGELA due to overdiuresis? .  We will hold diuretics and start IV fluids. 2. CKD stage IV, with mild proteinuria, urine ACR 58.1, baseline creatinine 1.8-1.9 mg/dL, secondary to hypertensive nephrosclerosis. 3. Acidemia, (venous pH 7.33, estimated arterial pH:7.37), with respiratory acidosis and metabolic alkalosis. 4. HTN, BP medications on hold   5. HFpEF 55-60% with stage II DD, pro-BNP 7,692>>8,190 >9914, on Lasix  ------------------------------------------------  6. Right pleural effusion, s/p thoracentesis  7. New onset PAF with multiple pauses, metoprolol on hold   8. Low grade temp, on doxycycline  9. Normocytic anemia, iron 39 mcg/dL, iron saturation 16%, on IV iron.       Plan:    · Hold Lasix  · Start NS at 50 cc/hour  · Continue to monitor renal function, BMP 4 PM  · Continue to monitor bicarbonate levels      Electronically signed by Dianelys Bernardo MD on 8/2/2021 at 10:25 AM

## 2021-08-02 NOTE — PATIENT CARE CONFERENCE
P Quality Flow/Interdisciplinary Rounds Progress Note        Quality Flow Rounds held on August 2, 2021    Disciplines Attending:  Bedside Nurse, ,  and Nursing Unit Leadership    Lito Waterman was admitted on 7/23/2021 11:33 AM    Anticipated Discharge Date:  Expected Discharge Date: 07/30/21    Disposition:    Rigo Score:  Rigo Scale Score: 12    Readmission Risk              Risk of Unplanned Readmission:  25           Discussed patient goal for the day, patient clinical progression, and barriers to discharge.   The following Goal(s) of the Day/Commitment(s) have been identified:  Discharge - [address], RN  August 2, 2021

## 2021-08-02 NOTE — PLAN OF CARE
Problem: Confusion - Acute:  Goal: Absence of continued neurological deterioration signs and symptoms  Description: Absence of continued neurological deterioration signs and symptoms  8/2/2021 1249 by Anupama Zapata RN  Outcome: Met This Shift  8/2/2021 0024 by Chela Greco RN  Outcome: Met This Shift     Problem: Injury - Risk of, Physical Injury:  Goal: Absence of physical injury  Description: Absence of physical injury  8/2/2021 1249 by Anupama Zapata RN  Outcome: Met This Shift  8/2/2021 0024 by Chela Greco RN  Outcome: Met This Shift     Problem: Falls - Risk of:  Goal: Absence of physical injury  Description: Absence of physical injury  8/2/2021 1249 by Anupama Zapata RN  Outcome: Met This Shift  8/2/2021 0024 by Chela Greco RN  Outcome: Met This Shift     Problem: Cardiac:  Goal: Hemodynamic stability will improve  Description: Hemodynamic stability will improve  8/2/2021 1249 by Anupama Zapata RN  Outcome: Met This Shift  8/2/2021 0024 by Chela Greco RN  Outcome: Met This Shift     Problem: Skin Integrity:  Goal: Will show no infection signs and symptoms  Description: Will show no infection signs and symptoms  8/2/2021 1249 by Anupama Zapata RN  Outcome: Met This Shift  8/2/2021 0024 by Chela Greco RN  Outcome: Met This Shift     Problem: Gas Exchange - Impaired:  Goal: Levels of oxygenation will improve  Description: Levels of oxygenation will improve  8/2/2021 1249 by Anupama Zapata RN  Outcome: Ongoing  8/2/2021 0024 by Chela Greco RN  Outcome: Met This Shift

## 2021-08-02 NOTE — PROGRESS NOTES
Subjective: The patient is awake and at baseline mentation, somewhat aphasic and difficult to communicate  No acute complaints  Feels much more alert today and tells me that she is doing well  Still waiting for pre-CERT to be done for discharge    Objective:    BP (!) 121/47   Pulse 77   Temp 98.1 °F (36.7 °C) (Temporal)   Resp 18   Ht 5' 4\" (1.626 m)   Wt 159 lb 12.8 oz (72.5 kg)   SpO2 96%   BMI 27.43 kg/m²     In: -   Out: 450   In: -   Out: 450 [Urine:450]    General appearance: NAD, conversant but confused  HEENT: AT/NC, MMM  Neck: FROM, supple  Lungs: Clear to auscultation  CV: RRR, no MRGs  Vasc: Radial pulses 2+  Abdomen: Soft, non-tender; no masses or HSM  Extremities: Swelling bilateral lower extremity  Skin: no rash, lesions or ulcers  Psych: Alert and oriented to person, place and time  Neuro: Alert and interactive     Recent Labs     07/31/21  0548 08/01/21  0449 08/02/21  0703   WBC 9.0 10.6 7.3   HGB 9.7* 10.1* 10.4*   HCT 32.7* 34.5 35.3    253 249       Recent Labs     08/01/21  0449 08/01/21  1704 08/02/21  0703    142 141   K 4.3 3.9 4.3   CL 99 97* 96*   CO2 40* 33* 32*   BUN 39* 43* 49*   CREATININE 2.1* 2.4* 2.7*   CALCIUM 9.0 9.2 9.3       Assessment:    Principal Problem:    Acute congestive heart failure (HCC)  Active Problems:    Stage 4 chronic kidney disease (HCC)    Type 2 diabetes mellitus without complication, with long-term current use of insulin (HCC)    Hypertension    Vascular dementia without behavioral disturbance (HCC)    Prolonged Q-T interval on ECG    Acute respiratory failure with hypoxia (HCC)    Acute kidney injury superimposed on CKD (HCC)    Paroxysmal atrial fibrillation (HCC)  Resolved Problems:    * No resolved hospital problems.  *      Plan:    Admitted to telemetry for evaluation of acute congestive heart failure in a patient with known history of chronic kidney disease and multiple comorbidities  Back on IV fluids now secondary to BUN/creatinine 43/2.4-renal following   A. fib ranging from 100-1 20s-no plans for pacemaker  Status post left thoracentesis by interventional radiology 7/26/2021    Pulmonology on board for acute on chronic respiratory failure-have signed off, okay for discharge  Electrophysiology/general cardiology following for new onset atrial fibrillation with multiple pauses-no pacemaker planned-cardiology and EP have signed off    Palliative care following, consideration being given to palliation/hospice    Echo?  discontinue IV antibiotics on discharge -No fevers  Decrease Lantus insulin to 12 units and change sliding scale to low-dose      Continue to monitor labs/electrolytes while in-house      DVT Prophylaxis   PT/OT  Discharge planning--Per case management, family has no immediate plans for hospice.   patient can go once okay with renal-BUN/creatinine back up to 43/2.4 from diuresis          Lauren Cade MD  12:08 PM  8/2/2021

## 2021-08-02 NOTE — PLAN OF CARE
Problem: Confusion - Acute:  Goal: Absence of continued neurological deterioration signs and symptoms  Description: Absence of continued neurological deterioration signs and symptoms  Outcome: Met This Shift  Goal: Mental status will be restored to baseline  Description: Mental status will be restored to baseline  Outcome: Met This Shift

## 2021-08-02 NOTE — PROGRESS NOTES
Casandra Rincon M.D.,[facility]  Yao Rodríguez D.O., F.A.C.ONicI., Cortez Patricia M.D. Venessa Bocanegra M.D., Joe Fitzpatrick M.D. Anette Steele D.O. Daily Pulmonary Progress Note    Patient:  Molly Lemus 80 y.o. female MRN: [id number]     Date of Service: 8/2/2021        Subjective      Patient was seen and examined lying in bed in no apparent distress. Alert. Stating she is leaving today.     24-hour events:  None    Objective   Vitals: BP (!) 109/42   Pulse 81   Temp 98.1 °F (36.7 °C) (Oral)   Resp 18   Ht 5' 4\" (1.626 m)   Wt 159 lb 12.8 oz (72.5 kg)   SpO2 96%   BMI 27.43 kg/m²     I/O:    Intake/Output Summary (Last 24 hours) at 8/2/2021 1603  Last data filed at 8/2/2021 1354  Gross per 24 hour   Intake 300 ml   Output 450 ml   Net -150 ml       Vent Information  Skin Assessment: Clean, dry, & intact  SpO2: 96 %                CURRENT MEDS :  Scheduled Meds:   heparin (porcine)  5,000 Units Subcutaneous Q8H    insulin glargine  12 Units Subcutaneous Nightly    insulin lispro  0-6 Units Subcutaneous TID WC    insulin lispro  0-3 Units Subcutaneous Nightly    doxycycline hyclate  100 mg Oral 2 times per day    [Held by provider] furosemide  20 mg Oral BID    aspirin  81 mg Oral Daily    levETIRAcetam  250 mg Oral QAM AC    mirtazapine  15 mg Oral Nightly    sodium chloride flush  5-40 mL Intravenous 2 times per day       Physical Exam:  General: Lying in bed comfortably, no distress, breathing is not labored  HEENT: PERRL, EOMI, MMM, no oral lesions  Neck: supple, no adenopathy  CV: RRR without murmur  Lungs: clear to auscultation bilaterally without wheezing, no crackles, decreased at the lung bases   Abd: soft, NT, ND, bowel sounds normal  Ext: warm, no edema, no clubbing  Skin: no rashes        Pertinent/ New Labs and Imaging Studies     Imaging Personally Reviewed:    7/30  Interval development of small right-sided pleural effusion.       Improvement of small left-sided pleural effusion.       Bilateral opacities may represent pneumonia in the proper clinical setting           Labs:  Lab Results   Component Value Date    WBC 7.3 08/02/2021    HGB 10.4 08/02/2021    HCT 35.3 08/02/2021    MCV 97.0 08/02/2021    MCH 28.6 08/02/2021    MCHC 29.5 08/02/2021    RDW 16.7 08/02/2021     08/02/2021    MPV 11.2 08/02/2021     Lab Results   Component Value Date     08/02/2021    K 4.3 08/02/2021    K 5.1 07/24/2021    CL 96 08/02/2021    CO2 32 08/02/2021    BUN 49 08/02/2021    CREATININE 2.7 08/02/2021    LABALBU 3.4 08/02/2021    LABALBU 3.0 03/28/2018    CALCIUM 9.3 08/02/2021    GFRAA 20 08/02/2021    LABGLOM 16 08/02/2021    LABGLOM 33 03/28/2018     Lab Results   Component Value Date    PROTIME 13.0 07/26/2021    PROTIME 12.2 06/07/2021    INR 1.2 07/26/2021        Assessment:    1. Acute hypoxic respiratory failure secondary to pleural effusions/atelectasis  2. Bilateral pleural effusions right greater than left, appearance of partially loculated component to left pleural effusion s/p right sided thoracentesis 7/26 850 cc removed transudate  3. Acute decompensated CHF  4. ANGELA on CKD  5. Dementia  6. Medical noncompliance  7. Pneumonia   8. dysphagia      Plan:   1. Oxygen therapy 3 liters NC  2. Complete a course of antibiotics ( changed to oral doxycycline)  3. Dysphagia diet   4. Negative fluid balance  5. Okay for d/c from a pulmonary standpoint  This plan of care was reviewed in collaboration with Dr. Randa Ga  Electronically signed by CRAIG García CNP on 8/2/2021 at 4:03 PM    I personally saw, examined, and cared for the patient. Labs, medications, radiographs reviewed. I agree with history exam and plans detailed in NP note.   Darlene Horne MD

## 2021-08-02 NOTE — CARE COORDINATION
Per Delfin Franco at [facility], they will take without pre cert. Discharge order on chart. I placed a call [facility] to arrange transportation and had to leave voicemail. Covid test was done 07/30/21 and no need to repeat per Lilli. 1200  No discharge today. Cr 2.7 and started on iv fluids. Nurse to dc discharge order. Lilli at [facility] updated. [address] with pt's daughter, Annette Garcia, EQ#[phone]. Annette Garcia is checking on eventually [facility] her [facility]. She said we will continue with discharge plan to [facility] the [facility] for now and she did not want me with reach out to liaison at [facility].  Sw/cm will follow

## 2021-08-03 LAB
ALBUMIN SERPL-MCNC: 3.4 G/DL (ref 3.5–5.2)
ALP BLD-CCNC: 105 U/L (ref 35–104)
ALT SERPL-CCNC: 25 U/L (ref 0–32)
ANION GAP SERPL CALCULATED.3IONS-SCNC: 8 MMOL/L (ref 7–16)
ANION GAP SERPL CALCULATED.3IONS-SCNC: 9 MMOL/L (ref 7–16)
AST SERPL-CCNC: 28 U/L (ref 0–31)
BASOPHILS ABSOLUTE: 0.05 E9/L (ref 0–0.2)
BASOPHILS RELATIVE PERCENT: 0.7 % (ref 0–2)
BILIRUB SERPL-MCNC: <0.2 MG/DL (ref 0–1.2)
BUN BLDV-MCNC: 49 MG/DL (ref 6–23)
BUN BLDV-MCNC: 49 MG/DL (ref 6–23)
CALCIUM SERPL-MCNC: 8.6 MG/DL (ref 8.6–10.2)
CALCIUM SERPL-MCNC: 8.7 MG/DL (ref 8.6–10.2)
CHLORIDE BLD-SCNC: 100 MMOL/L (ref 98–107)
CHLORIDE BLD-SCNC: 101 MMOL/L (ref 98–107)
CO2: 32 MMOL/L (ref 22–29)
CO2: 33 MMOL/L (ref 22–29)
CREAT SERPL-MCNC: 2.3 MG/DL (ref 0.5–1)
CREAT SERPL-MCNC: 2.3 MG/DL (ref 0.5–1)
EOSINOPHILS ABSOLUTE: 0.43 E9/L (ref 0.05–0.5)
EOSINOPHILS RELATIVE PERCENT: 5.7 % (ref 0–6)
GFR AFRICAN AMERICAN: 24
GFR AFRICAN AMERICAN: 24
GFR NON-AFRICAN AMERICAN: 20 ML/MIN/1.73
GFR NON-AFRICAN AMERICAN: 20 ML/MIN/1.73
GLUCOSE BLD-MCNC: 284 MG/DL (ref 74–99)
GLUCOSE BLD-MCNC: 286 MG/DL (ref 74–99)
HCT VFR BLD CALC: 32 % (ref 34–48)
HEMOGLOBIN: 9.6 G/DL (ref 11.5–15.5)
IMMATURE GRANULOCYTES #: 0.07 E9/L
IMMATURE GRANULOCYTES %: 0.9 % (ref 0–5)
LYMPHOCYTES ABSOLUTE: 1.54 E9/L (ref 1.5–4)
LYMPHOCYTES RELATIVE PERCENT: 20.6 % (ref 20–42)
MCH RBC QN AUTO: 28.5 PG (ref 26–35)
MCHC RBC AUTO-ENTMCNC: 30 % (ref 32–34.5)
MCV RBC AUTO: 95 FL (ref 80–99.9)
METER GLUCOSE: 187 MG/DL (ref 74–99)
METER GLUCOSE: 256 MG/DL (ref 74–99)
METER GLUCOSE: 284 MG/DL (ref 74–99)
METER GLUCOSE: 298 MG/DL (ref 74–99)
MONOCYTES ABSOLUTE: 0.62 E9/L (ref 0.1–0.95)
MONOCYTES RELATIVE PERCENT: 8.3 % (ref 2–12)
NEUTROPHILS ABSOLUTE: 4.78 E9/L (ref 1.8–7.3)
NEUTROPHILS RELATIVE PERCENT: 63.8 % (ref 43–80)
PDW BLD-RTO: 16.2 FL (ref 11.5–15)
PLATELET # BLD: 271 E9/L (ref 130–450)
PMV BLD AUTO: 11 FL (ref 7–12)
POTASSIUM SERPL-SCNC: 4.7 MMOL/L (ref 3.5–5)
POTASSIUM SERPL-SCNC: 4.7 MMOL/L (ref 3.5–5)
RBC # BLD: 3.37 E12/L (ref 3.5–5.5)
SODIUM BLD-SCNC: 141 MMOL/L (ref 132–146)
SODIUM BLD-SCNC: 142 MMOL/L (ref 132–146)
TOTAL PROTEIN: 6.1 G/DL (ref 6.4–8.3)
WBC # BLD: 7.5 E9/L (ref 4.5–11.5)

## 2021-08-03 PROCEDURE — 6370000000 HC RX 637 (ALT 250 FOR IP): Performed by: NURSE PRACTITIONER

## 2021-08-03 PROCEDURE — 2700000000 HC OXYGEN THERAPY PER DAY

## 2021-08-03 PROCEDURE — 2580000003 HC RX 258: Performed by: INTERNAL MEDICINE

## 2021-08-03 PROCEDURE — 6370000000 HC RX 637 (ALT 250 FOR IP): Performed by: INTERNAL MEDICINE

## 2021-08-03 PROCEDURE — 82962 GLUCOSE BLOOD TEST: CPT

## 2021-08-03 PROCEDURE — 36415 COLL VENOUS BLD VENIPUNCTURE: CPT

## 2021-08-03 PROCEDURE — 80053 COMPREHEN METABOLIC PANEL: CPT

## 2021-08-03 PROCEDURE — 80048 BASIC METABOLIC PNL TOTAL CA: CPT

## 2021-08-03 PROCEDURE — 85025 COMPLETE CBC W/AUTO DIFF WBC: CPT

## 2021-08-03 PROCEDURE — 2140000000 HC CCU INTERMEDIATE R&B

## 2021-08-03 PROCEDURE — 6360000002 HC RX W HCPCS: Performed by: INTERNAL MEDICINE

## 2021-08-03 RX ADMIN — INSULIN LISPRO 3 UNITS: 100 INJECTION, SOLUTION INTRAVENOUS; SUBCUTANEOUS at 11:17

## 2021-08-03 RX ADMIN — INSULIN LISPRO 3 UNITS: 100 INJECTION, SOLUTION INTRAVENOUS; SUBCUTANEOUS at 16:56

## 2021-08-03 RX ADMIN — INSULIN GLARGINE 12 UNITS: 100 INJECTION, SOLUTION SUBCUTANEOUS at 21:14

## 2021-08-03 RX ADMIN — HEPARIN SODIUM 5000 UNITS: 10000 INJECTION INTRAVENOUS; SUBCUTANEOUS at 06:52

## 2021-08-03 RX ADMIN — DOXYCYCLINE HYCLATE 100 MG: 100 CAPSULE ORAL at 08:16

## 2021-08-03 RX ADMIN — LEVETIRACETAM 250 MG: 500 TABLET ORAL at 06:42

## 2021-08-03 RX ADMIN — HEPARIN SODIUM 5000 UNITS: 10000 INJECTION INTRAVENOUS; SUBCUTANEOUS at 21:13

## 2021-08-03 RX ADMIN — HEPARIN SODIUM 5000 UNITS: 10000 INJECTION INTRAVENOUS; SUBCUTANEOUS at 15:12

## 2021-08-03 RX ADMIN — Medication 10 ML: at 21:13

## 2021-08-03 RX ADMIN — INSULIN LISPRO 1 UNITS: 100 INJECTION, SOLUTION INTRAVENOUS; SUBCUTANEOUS at 07:20

## 2021-08-03 RX ADMIN — DOXYCYCLINE HYCLATE 100 MG: 100 CAPSULE ORAL at 21:13

## 2021-08-03 RX ADMIN — ASPIRIN 81 MG: 81 TABLET, CHEWABLE ORAL at 08:16

## 2021-08-03 RX ADMIN — INSULIN LISPRO 2 UNITS: 100 INJECTION, SOLUTION INTRAVENOUS; SUBCUTANEOUS at 21:13

## 2021-08-03 RX ADMIN — MIRTAZAPINE 15 MG: 15 TABLET, FILM COATED ORAL at 21:13

## 2021-08-03 RX ADMIN — Medication 10 ML: at 08:16

## 2021-08-03 ASSESSMENT — PAIN SCALES - GENERAL
PAINLEVEL_OUTOF10: 0
PAINLEVEL_OUTOF10: 0

## 2021-08-03 NOTE — PLAN OF CARE
Problem: Confusion - Acute:  Goal: Absence of continued neurological deterioration signs and symptoms  Description: Absence of continued neurological deterioration signs and symptoms  Outcome: Met This Shift     Problem: Injury - Risk of, Physical Injury:  Goal: Absence of physical injury  Description: Absence of physical injury  Outcome: Met This Shift     Problem: Sleep Pattern Disturbance:  Goal: Appears well-rested  Description: Appears well-rested  Outcome: Met This Shift     Problem: Falls - Risk of:  Goal: Absence of physical injury  Description: Absence of physical injury  Outcome: Met This Shift     Problem: Cardiac:  Goal: Hemodynamic stability will improve  Description: Hemodynamic stability will improve  Outcome: Met This Shift     Problem: Gas Exchange - Impaired:  Goal: Levels of oxygenation will improve  Description: Levels of oxygenation will improve  Outcome: Met This Shift     Problem: Skin Integrity:  Goal: Will show no infection signs and symptoms  Description: Will show no infection signs and symptoms  Outcome: Met This Shift

## 2021-08-03 NOTE — CARE COORDINATION
Pt will not discharge today per renal. Anticipate tomorrow. Therapy dept called ext [phone] for updated pt/ot notes for in the am. Lilli with [address] [facility] the [facility] updated. Sw/cm will follow     1555  Informed by Yael Vogt at [address] [place] [address] denied skilled stay for pt. Peer to peer can be called to #[phone], option #5 [id number] no later than 8/4 at 9:30am. If peer to peer is not done then pt can still return there as extended care patient and will not get therapies. I will notify Dr Curt Cadena via [employer].

## 2021-08-03 NOTE — PROGRESS NOTES
Subjective:    No acute complaints  She feels alert and awake  Denies any acute complaints to me  Expresses that she wants to be discharged    Objective:    BP (!) 124/58   Pulse 76   Temp 98.1 °F (36.7 °C) (Oral)   Resp 18   Ht 5' 4\" (1.626 m)   Wt 161 lb 1.6 oz (73.1 kg)   SpO2 98%   BMI 27.65 kg/m²     In: 550 [P.O.:150; I.V.:400]  Out: 775   In: 550   Out: 775 [Urine:775]    General appearance: NAD, conversant but confused upon further questioning, otherwise tells me that she feels well  HEENT: AT/NC, MMM  Neck: FROM, supple  Lungs: Clear to auscultation  CV: RRR, no MRGs  Vasc: Radial pulses 2+  Abdomen: Soft, non-tender; no masses or HSM  Extremities: Swelling bilateral lower extremity  Skin: no rash, lesions or ulcers  Psych: Alert and oriented to person, place and time  Neuro: Alert and interactive     Recent Labs     08/01/21  0449 08/02/21  0703   WBC 10.6 7.3   HGB 10.1* 10.4*   HCT 34.5 35.3    249       Recent Labs     08/01/21  1704 08/02/21  0703 08/02/21  1547    141 136   K 3.9 4.3 3.7   CL 97* 96* 98   CO2 33* 32* 31*   BUN 43* 49* 17   CREATININE 2.4* 2.7* 1.0   CALCIUM 9.2 9.3 8.4*       Assessment:    Principal Problem:    Acute congestive heart failure (HCC)  Active Problems:    Stage 4 chronic kidney disease (HCC)    Type 2 diabetes mellitus without complication, with long-term current use of insulin (HCC)    Hypertension    Vascular dementia without behavioral disturbance (HCC)    Prolonged Q-T interval on ECG    Acute respiratory failure with hypoxia (HCC)    Acute kidney injury superimposed on CKD (HCC)    Paroxysmal atrial fibrillation (HCC)  Resolved Problems:    * No resolved hospital problems. *      Plan:    Admitted to telemetry for evaluation of acute congestive heart failure in a patient with known history of chronic kidney disease and multiple comorbidities  ?  Lab error yesterday showing a BUN/creatinine of 43/2.4-repeat stat labs today - if within normal limits patient can be discharged   A. fib ranging from 100-1 20s-no plans for pacemaker  Status post left thoracentesis by interventional radiology 7/26/2021    Pulmonology on board for acute on chronic respiratory failure-have signed off, okay for discharge  Electrophysiology/general cardiology following for new onset atrial fibrillation with multiple pauses-no pacemaker planned-cardiology and EP have signed off    Palliative care following, consideration being given to palliation/hospice    Echo?  discontinue IV antibiotics on discharge -No fevers  Decrease Lantus insulin to 12 units and change sliding scale to low-dose      Continue to monitor labs/electrolytes while in-house      DVT Prophylaxis   PT/OT  Discharge planning--Per case management, family has no immediate plans for hospice.   Okay for discharge if pre-CERT has been obtained pending BMP today        Rafael Barnes MD  1:53 PM  8/3/2021

## 2021-08-03 NOTE — PROGRESS NOTES
Department of Internal Medicine  Nephrology Progress Note    Events reviewed. Reviewed labs from yesterday evening, likely lab error. SUBJECTIVE:  We are following Ms. Eusebio Crenshaw for ANGELA on CKD. Reports no complaints. PHYSICAL EXAM:      Vitals:    VITALS:  BP (!) 124/58   Pulse 76   Temp 98.1 °F (36.7 °C) (Oral)   Resp 18   Ht 5' 4\" (1.626 m)   Wt 161 lb 1.6 oz (73.1 kg)   SpO2 98%   BMI 27.65 kg/m²   24HR INTAKE/OUTPUT:      Intake/Output Summary (Last 24 hours) at 8/3/2021 1215  Last data filed at 8/3/2021 4725  Gross per 24 hour   Intake 670 ml   Output 775 ml   Net -105 ml       Constitutional:  Alert, oriented to person only  HEENT:  Normocephalic and atraumatic. Respiratory:  Diminished. On 3L NC  Cardiovascular/Edema:  Normal rate and regular rhythm. Gastrointestinal:  Soft, nontender, + Bowel sounds  Neurologic:  She is alert and oriented to person, place, and time. Skin:  Skin is warm and dry. Bilateral lower extremity anterior tibial wounds    Scheduled Meds:   heparin (porcine)  5,000 Units Subcutaneous Q8H    insulin glargine  12 Units Subcutaneous Nightly    insulin lispro  0-6 Units Subcutaneous TID WC    insulin lispro  0-3 Units Subcutaneous Nightly    doxycycline hyclate  100 mg Oral 2 times per day    [Held by provider] furosemide  20 mg Oral BID    aspirin  81 mg Oral Daily    levETIRAcetam  250 mg Oral QAM AC    mirtazapine  15 mg Oral Nightly    sodium chloride flush  5-40 mL Intravenous 2 times per day     Continuous Infusions:   sodium chloride 50 mL/hr at 08/02/21 1147    sodium chloride      dextrose       PRN Meds:. LORazepam, perflutren lipid microspheres, ipratropium-albuterol, sodium chloride flush, sodium chloride, polyethylene glycol, acetaminophen **OR** acetaminophen, glucose, dextrose, glucagon (rDNA), dextrose    DATA:    CBC:   Lab Results   Component Value Date    WBC 7.3 08/02/2021    RBC 3.64 08/02/2021    HGB 10.4 08/02/2021    HCT 35.3 08/02/2021    MCV 97.0 08/02/2021    MCH 28.6 08/02/2021    MCHC 29.5 08/02/2021    RDW 16.7 08/02/2021     08/02/2021    MPV 11.2 08/02/2021     CMP:    Lab Results   Component Value Date     08/02/2021    K 3.7 08/02/2021    K 5.1 07/24/2021    CL 98 08/02/2021    CO2 31 08/02/2021    BUN 17 08/02/2021    CREATININE 1.0 08/02/2021    GFRAA >60 08/02/2021    LABGLOM 52 08/02/2021    LABGLOM 33 03/28/2018    GLUCOSE 153 08/02/2021    GLUCOSE 263 03/28/2018    PROT 6.4 08/02/2021    LABALBU 3.4 08/02/2021    LABALBU 3.0 03/28/2018    CALCIUM 8.4 08/02/2021    BILITOT 0.3 08/02/2021    BILITOT NEGATIVE 10/03/2017    ALKPHOS 103 08/02/2021    AST 37 08/02/2021    ALT 26 08/02/2021     Magnesium:    Lab Results   Component Value Date    MG 1.9 07/27/2021     Phosphorus:    Lab Results   Component Value Date    PHOS 3.2 07/27/2021     Urine Studies:   Results for Hany Ramirez (MRN [id number]) as of 7/25/2021 10:02   Ref.  Range 7/24/2021 15:40 7/24/2021 15:40   Creatinine, Ur Latest Ref Range: 29 - 226 mg/dL 27 (L) 27 (L)   Microalbumin Creatinine Ratio Latest Ref Range: 0.0 - 30.0   58.1 (H)   Microalbumin, Random Urine Latest Ref Range: Not Established mg/L  15.7   Osmolality, Ur Latest Ref Range: 300 - 900 mOsm/kg 347    Protein, Ur Latest Ref Range: 0 - 12 mg/dL 5    Protein/Creat Ratio Latest Ref Range: 0.0 - 0.2  0.2 0.2   Urea Nitrogen, Ur Latest Ref Range: 800 - 1666 mg/dL 255 (L)          Radiology Review:      CT Head 7/23/21   1.  Slightly limited exam, grossly negative for acute intracranial process,   within the limits of this non-contrast CT exam.       2.  Sequela of atherosclerotic arterial and chronic microvascular ischemic   disease, as described.       3.  Age-appropriate, generalized parenchymal volume loss.       4.  Paranasal sinus, left mastoid air cell complex, and bilateral external   auditory canal disease, as described.  The right mastoid air cell complex is   moderately/severely hypoplastic.       5.  Chronic osseous findings, as described     CT chest 7/23/21   1. Moderate pleural effusions, right larger than left.  Left pleural effusion   is partially loculated. 2. Right lower lobe consolidation and volume loss, likely atelectasis   although superimposed pneumonia/infiltrate not excluded. 3. Mild interstitial thickening probably represents mild interstitial edema. Renal US 7/24/21   Bilateral renal cortical thinning which suggest changes related to chronic   underlying medical renal disease.  No hydronephrosis.       The bladder was not distended for this exam and could not be evaluated. CXR 7/25/2021   1.  Interval slight increase in the right greater than left pleural effusions   and mid to lower lung opacities.  No large pneumothorax.       2.  Atherosclerotic disease, cardiomegaly, and central pulmonary vascular   congestion are unchanged. BRIEF SUMMARY OF INITIAL CONSULT:    Briefly, Mrs. Shannan Harris is a 80year old female with a past medical history of CKD Stage IV baseline creatinine 2.5-2.7mg/dL, HTN, Type 2 DM, HFpEF 55-60% with a stage II DD, dementia, hyperlipidemia, anemia, recent admission with pneumonia, who presented to the ER on 7/23/21 shortness of breath. Upon arrival to the ER found to be hyperkalemic, potassium: 5.9 mmol/L, as well as with worsening renal function BUN 81, Creatinine 3.1 mg/dL, reason for this consultation. . Chest Xray was consistent with CHF exacerbation. Problems resolved:  · Hyperkalemia, S/P hyperkalemic protocol, s/p Lokelma  · Hypernatremia   · ANGELA stage I on CKD, likely hemodynamically mediated pre-renal ANGELA secondary to cardiorenal syndrome, non complaint with medications at skilled nursing facility. Nonoliguric. Renal function continues to improve better than baseline with diuretic administration, creatinine 1.8 mg/dl.   · Hypokalemia, secondary to poor oral intake and use of diuretics, for replacement  · Acute respiratory failure, secondary to # 4. Requiring high levels of supplemental oxygen     IMPRESSION/RECOMMENDATIONS:      1. Recurrent ANGELA on CKD, probably volume responsive pre-renal ANGELA due to overt diuresis. Renal function has improved with IV fluid administration. We will continue to hold diuretics and continue with IV fluids for one more day. 2. CKD stage IV, with mild proteinuria, urine ACR 58.1, baseline creatinine 1.8-1.9 mg/dL, secondary to hypertensive nephrosclerosis. 3. Acidemia, (venous pH 7.33, estimated arterial pH:7.37), with respiratory acidosis and metabolic alkalosis. 4. HTN, BP medications on hold   5. HFpEF 55-60% with stage II DD, pro-BNP 7,692>>8,190 >9914, Lasix on hold  ------------------------------------------------  6. Right pleural effusion, s/p thoracentesis  7. New onset PAF with multiple pauses, metoprolol on hold   8. Low grade temp, on doxycycline  9. Normocytic anemia, iron 39 mcg/dL, iron saturation 16%, on IV iron.       Plan:    · Continue to hold Lasix for one more day  · Continue NS at 50 cc/hour for one more day  · Continue to monitor renal function  · Continue to monitor bicarbonate levels  · Obtain magnesium and phosphorus levels in AM      Electronically signed by CRAIG Almazan CNP on 8/3/2021 at 2:26 PM

## 2021-08-04 VITALS
OXYGEN SATURATION: 96 % | TEMPERATURE: 97.1 F | BODY MASS INDEX: 27.5 KG/M2 | WEIGHT: 161.1 LBS | SYSTOLIC BLOOD PRESSURE: 150 MMHG | RESPIRATION RATE: 18 BRPM | HEART RATE: 81 BPM | DIASTOLIC BLOOD PRESSURE: 66 MMHG | HEIGHT: 64 IN

## 2021-08-04 LAB
ANION GAP SERPL CALCULATED.3IONS-SCNC: 11 MMOL/L (ref 7–16)
BASOPHILS ABSOLUTE: 0.04 E9/L (ref 0–0.2)
BASOPHILS RELATIVE PERCENT: 0.4 % (ref 0–2)
BUN BLDV-MCNC: 46 MG/DL (ref 6–23)
CALCIUM SERPL-MCNC: 8.9 MG/DL (ref 8.6–10.2)
CHLORIDE BLD-SCNC: 101 MMOL/L (ref 98–107)
CO2: 30 MMOL/L (ref 22–29)
CREAT SERPL-MCNC: 1.9 MG/DL (ref 0.5–1)
EOSINOPHILS ABSOLUTE: 0.53 E9/L (ref 0.05–0.5)
EOSINOPHILS RELATIVE PERCENT: 5.2 % (ref 0–6)
GFR AFRICAN AMERICAN: 30
GFR NON-AFRICAN AMERICAN: 25 ML/MIN/1.73
GLUCOSE BLD-MCNC: 167 MG/DL (ref 74–99)
HCT VFR BLD CALC: 30.3 % (ref 34–48)
HEMOGLOBIN: 9.2 G/DL (ref 11.5–15.5)
IMMATURE GRANULOCYTES #: 0.11 E9/L
IMMATURE GRANULOCYTES %: 1.1 % (ref 0–5)
LYMPHOCYTES ABSOLUTE: 1.97 E9/L (ref 1.5–4)
LYMPHOCYTES RELATIVE PERCENT: 19.3 % (ref 20–42)
MAGNESIUM: 1.8 MG/DL (ref 1.6–2.6)
MCH RBC QN AUTO: 28.8 PG (ref 26–35)
MCHC RBC AUTO-ENTMCNC: 30.4 % (ref 32–34.5)
MCV RBC AUTO: 95 FL (ref 80–99.9)
METER GLUCOSE: 129 MG/DL (ref 74–99)
METER GLUCOSE: 265 MG/DL (ref 74–99)
MONOCYTES ABSOLUTE: 0.88 E9/L (ref 0.1–0.95)
MONOCYTES RELATIVE PERCENT: 8.6 % (ref 2–12)
NEUTROPHILS ABSOLUTE: 6.7 E9/L (ref 1.8–7.3)
NEUTROPHILS RELATIVE PERCENT: 65.4 % (ref 43–80)
PDW BLD-RTO: 16 FL (ref 11.5–15)
PHOSPHORUS: 3.9 MG/DL (ref 2.5–4.5)
PLATELET # BLD: 279 E9/L (ref 130–450)
PMV BLD AUTO: 11.2 FL (ref 7–12)
POTASSIUM SERPL-SCNC: 4.4 MMOL/L (ref 3.5–5)
RBC # BLD: 3.19 E12/L (ref 3.5–5.5)
SODIUM BLD-SCNC: 142 MMOL/L (ref 132–146)
WBC # BLD: 10.2 E9/L (ref 4.5–11.5)

## 2021-08-04 PROCEDURE — 84100 ASSAY OF PHOSPHORUS: CPT

## 2021-08-04 PROCEDURE — 97535 SELF CARE MNGMENT TRAINING: CPT

## 2021-08-04 PROCEDURE — 97530 THERAPEUTIC ACTIVITIES: CPT

## 2021-08-04 PROCEDURE — 2580000003 HC RX 258: Performed by: INTERNAL MEDICINE

## 2021-08-04 PROCEDURE — 36415 COLL VENOUS BLD VENIPUNCTURE: CPT

## 2021-08-04 PROCEDURE — 85025 COMPLETE CBC W/AUTO DIFF WBC: CPT

## 2021-08-04 PROCEDURE — 83735 ASSAY OF MAGNESIUM: CPT

## 2021-08-04 PROCEDURE — 6370000000 HC RX 637 (ALT 250 FOR IP): Performed by: NURSE PRACTITIONER

## 2021-08-04 PROCEDURE — 6370000000 HC RX 637 (ALT 250 FOR IP): Performed by: INTERNAL MEDICINE

## 2021-08-04 PROCEDURE — 80048 BASIC METABOLIC PNL TOTAL CA: CPT

## 2021-08-04 PROCEDURE — 82962 GLUCOSE BLOOD TEST: CPT

## 2021-08-04 PROCEDURE — 2700000000 HC OXYGEN THERAPY PER DAY

## 2021-08-04 PROCEDURE — 6360000002 HC RX W HCPCS: Performed by: INTERNAL MEDICINE

## 2021-08-04 RX ORDER — FUROSEMIDE 40 MG/1
40 TABLET ORAL DAILY
Status: DISCONTINUED | OUTPATIENT
Start: 2021-08-05 | End: 2021-08-04 | Stop reason: HOSPADM

## 2021-08-04 RX ADMIN — ASPIRIN 81 MG: 81 TABLET, CHEWABLE ORAL at 08:58

## 2021-08-04 RX ADMIN — SODIUM CHLORIDE: 9 INJECTION, SOLUTION INTRAVENOUS at 06:34

## 2021-08-04 RX ADMIN — HEPARIN SODIUM 5000 UNITS: 10000 INJECTION INTRAVENOUS; SUBCUTANEOUS at 06:28

## 2021-08-04 RX ADMIN — LEVETIRACETAM 250 MG: 500 TABLET ORAL at 06:28

## 2021-08-04 RX ADMIN — INSULIN LISPRO 3 UNITS: 100 INJECTION, SOLUTION INTRAVENOUS; SUBCUTANEOUS at 12:51

## 2021-08-04 RX ADMIN — DOXYCYCLINE HYCLATE 100 MG: 100 CAPSULE ORAL at 08:58

## 2021-08-04 RX ADMIN — Medication 10 ML: at 08:58

## 2021-08-04 NOTE — PROGRESS NOTES
Department of Internal Medicine  Nephrology Progress Note    Events reviewed. SUBJECTIVE:  We are following MsNic Rivero for ANGELA on CKD. Reports no complaints. Sitting up in chair. PHYSICAL EXAM:      Vitals:    VITALS:  /65   Pulse 92   Temp 96.3 °F (35.7 °C) (Temporal)   Resp 16   Ht 5' 4\" (1.626 m)   Wt 161 lb 1.6 oz (73.1 kg)   SpO2 96%   BMI 27.65 kg/m²   24HR INTAKE/OUTPUT:      Intake/Output Summary (Last 24 hours) at 8/4/2021 1206  Last data filed at 8/4/2021 5604  Gross per 24 hour   Intake 1818 ml   Output 850 ml   Net 968 ml       Constitutional:  Alert, oriented to person only  HEENT:  Normocephalic and atraumatic. Respiratory:  Diminished. On 3L NC  Cardiovascular/Edema:  Normal rate and regular rhythm. Gastrointestinal:  Soft, nontender, + Bowel sounds  Neurologic:  She is alert and oriented to person, place, and time. Skin:  Skin is warm and dry. Bilateral lower extremity anterior tibial wounds    Scheduled Meds:   [START ON 8/5/2021] furosemide  40 mg Oral Daily    heparin (porcine)  5,000 Units Subcutaneous Q8H    insulin glargine  12 Units Subcutaneous Nightly    insulin lispro  0-6 Units Subcutaneous TID WC    insulin lispro  0-3 Units Subcutaneous Nightly    doxycycline hyclate  100 mg Oral 2 times per day    aspirin  81 mg Oral Daily    levETIRAcetam  250 mg Oral QAM AC    mirtazapine  15 mg Oral Nightly    sodium chloride flush  5-40 mL Intravenous 2 times per day     Continuous Infusions:   sodium chloride      dextrose       PRN Meds:. LORazepam, perflutren lipid microspheres, ipratropium-albuterol, sodium chloride flush, sodium chloride, polyethylene glycol, acetaminophen **OR** acetaminophen, glucose, dextrose, glucagon (rDNA), dextrose    DATA:    CBC:   Lab Results   Component Value Date    WBC 10.2 08/04/2021    RBC 3.19 08/04/2021    HGB 9.2 08/04/2021    HCT 30.3 08/04/2021    MCV 95.0 08/04/2021    MCH 28.8 08/04/2021    MCHC 30.4 08/04/2021    RDW 16.0 08/04/2021     08/04/2021    MPV 11.2 08/04/2021     CMP:    Lab Results   Component Value Date     08/04/2021    K 4.4 08/04/2021    K 5.1 07/24/2021     08/04/2021    CO2 30 08/04/2021    BUN 46 08/04/2021    CREATININE 1.9 08/04/2021    GFRAA 30 08/04/2021    LABGLOM 25 08/04/2021    LABGLOM 33 03/28/2018    GLUCOSE 167 08/04/2021    GLUCOSE 263 03/28/2018    PROT 6.1 08/03/2021    LABALBU 3.4 08/03/2021    LABALBU 3.0 03/28/2018    CALCIUM 8.9 08/04/2021    BILITOT <0.2 08/03/2021    BILITOT NEGATIVE 10/03/2017    ALKPHOS 105 08/03/2021    AST 28 08/03/2021    ALT 25 08/03/2021     Magnesium:    Lab Results   Component Value Date    MG 1.8 08/04/2021     Phosphorus:    Lab Results   Component Value Date    PHOS 3.9 08/04/2021     Urine Studies:   Results for Deborah Pineda (MRN [id number]) as of 7/25/2021 10:02   Ref.  Range 7/24/2021 15:40 7/24/2021 15:40   Creatinine, Ur Latest Ref Range: 29 - 226 mg/dL 27 (L) 27 (L)   Microalbumin Creatinine Ratio Latest Ref Range: 0.0 - 30.0   58.1 (H)   Microalbumin, Random Urine Latest Ref Range: Not Established mg/L  15.7   Osmolality, Ur Latest Ref Range: 300 - 900 mOsm/kg 347    Protein, Ur Latest Ref Range: 0 - 12 mg/dL 5    Protein/Creat Ratio Latest Ref Range: 0.0 - 0.2  0.2 0.2   Urea Nitrogen, Ur Latest Ref Range: 800 - 1666 mg/dL 255 (L)          Radiology Review:      CT Head 7/23/21   1.  Slightly limited exam, grossly negative for acute intracranial process,   within the limits of this non-contrast CT exam.       2.  Sequela of atherosclerotic arterial and chronic microvascular ischemic   disease, as described.       3.  Age-appropriate, generalized parenchymal volume loss.       4.  Paranasal sinus, left mastoid air cell complex, and bilateral external   auditory canal disease, as described.  The right mastoid air cell complex is   moderately/severely hypoplastic.       5.  Chronic osseous findings, as described     CT chest 7/23/21   1. Moderate pleural effusions, right larger than left.  Left pleural effusion   is partially loculated. 2. Right lower lobe consolidation and volume loss, likely atelectasis   although superimposed pneumonia/infiltrate not excluded. 3. Mild interstitial thickening probably represents mild interstitial edema. Renal US 7/24/21   Bilateral renal cortical thinning which suggest changes related to chronic   underlying medical renal disease.  No hydronephrosis.       The bladder was not distended for this exam and could not be evaluated. CXR 7/25/2021   1.  Interval slight increase in the right greater than left pleural effusions   and mid to lower lung opacities.  No large pneumothorax.       2.  Atherosclerotic disease, cardiomegaly, and central pulmonary vascular   congestion are unchanged. BRIEF SUMMARY OF INITIAL CONSULT:    Briefly, Mrs. Tonie Mauro is a 80year old female with a past medical history of CKD Stage IV baseline creatinine 2.5-2.7mg/dL, HTN, Type 2 DM, HFpEF 55-60% with a stage II DD, dementia, hyperlipidemia, anemia, recent admission with pneumonia, who presented to the ER on 7/23/21 shortness of breath. Upon arrival to the ER found to be hyperkalemic, potassium: 5.9 mmol/L, as well as with worsening renal function BUN 81, Creatinine 3.1 mg/dL, reason for this consultation. . Chest Xray was consistent with CHF exacerbation. Problems resolved:  · Hyperkalemia, S/P hyperkalemic protocol, s/p Lokelma  · Hypernatremia   · ANGELA stage I on CKD, likely hemodynamically mediated pre-renal ANGELA secondary to cardiorenal syndrome, non complaint with medications at skilled nursing facility. Nonoliguric. Renal function continues to improve better than baseline with diuretic administration, creatinine 1.8 mg/dl. · Hypokalemia, secondary to poor oral intake and use of diuretics, for replacement  · Acute respiratory failure, secondary to # 4.   Requiring high levels of supplemental oxygen     IMPRESSION/RECOMMENDATIONS:      1. Recurrent ANGELA on CKD, probably volume responsive pre-renal ANGELA due to overt diuresis. Renal function has continued to improve with IV fluid administration. We will resume diuretics. 2. CKD stage IV, with mild proteinuria, urine ACR 58.1, baseline creatinine 1.8-1.9 mg/dL, secondary to hypertensive nephrosclerosis. 3. Acidemia, (venous pH 7.33, estimated arterial pH:7.37), with respiratory acidosis and metabolic alkalosis. 4. HTN, BP medications on hold   5. HFpEF 55-60% with stage II DD, pro-BNP 7,692>>8,190 >9914, we will resume Lasix   ------------------------------------------------  6. Right pleural effusion, s/p thoracentesis  7. New onset PAF with multiple pauses, metoprolol on hold   8. Low grade temp, on doxycycline  9. Normocytic anemia, iron 39 mcg/dL, iron saturation 16%, s/p IV iron.       Plan:    · Resume Lasix 40 mg PO daily  · Stop IV fluids  · Continue to monitor renal function  · Continue to monitor bicarbonate levels  · OK to discharge from renal point of view  · Follow up in our office in 2-3 weeks  · BMP weekly x2      Electronically signed by CRAIG Joyce CNP on 8/4/2021 at 12:09 PM

## 2021-08-04 NOTE — DISCHARGE SUMMARY
Physician Discharge Summary     Patient ID:  Cem Giles  [id number]  30 y.o.  9/2/1926    Admit date: 7/23/2021    Discharge date and time: 8/4/2021    Admission Diagnoses:   Patient Active Problem List   Diagnosis    Stage 4 chronic kidney disease ([facility] [address]    Anemia of chronic disease    Type 2 diabetes mellitus without complication, with long-term current use of insulin ([facility] [address]    Hypertension    Hyperlipidemia    Vascular dementia without behavioral disturbance ([facility] [address]    Situational depression    Vitamin D deficiency    Osteoarthritis    Overactive bladder    Prolonged Q-T interval on ECG    Acute congestive heart failure (HCC)    Acute respiratory failure with hypoxia (HCC)    Acute kidney injury superimposed on CKD (HCC)    Paroxysmal atrial fibrillation (HCC)       Discharge Diagnoses: Volume overload, atrial fibrillation, acute on chronic renal failure, change in mental status    Consults: Pulmonology cardiology renal    Procedures: None    Hospital Course:  Admitted to telemetry for evaluation of acute congestive heart failure in a patient with known history of chronic kidney disease and multiple comorbidities  BUN/creatinine improved since admission   A. fib ranging from 100-1 20s-no plans for pacemaker  Status post left thoracentesis by interventional radiology 7/26/2021     Pulmonology on board for acute on chronic respiratory failure-have signed off, okay for discharge  Electrophysiology/general cardiology following for new onset atrial fibrillation with multiple pauses-no pacemaker planned-cardiology and EP have signed off     Palliative care following, consideration being given to palliation/hospice     Echo-55 to 60% ejection fraction completed 7/29/2021  discontinue IV antibiotics on discharge -No fevers  Decrease Lantus insulin to 12 units and change sliding scale to low-dose     Okay for discharge from medicine standpoint  Continue to monitor renal function at facility  Placed on p.o. Lasix       Recent Labs     08/02/21  0703 08/03/21  1337 08/04/21  0614   WBC 7.3 7.5 10.2   HGB 10.4* 9.6* 9.2*   HCT 35.3 32.0* 30.3*    271 279       Recent Labs     08/02/21  1547 08/03/21  1337 08/04/21  0835    142  141 142   K 3.7 4.7  4.7 4.4   CL 98 101  100 101   CO2 31* 32*  33* 30*   BUN 17 49*  49* 46*   CREATININE 1.0 2.3*  2.3* 1.9*   CALCIUM 8.4* 8.7  8.6 8.9       CT Head WO Contrast    Result Date: 7/23/2021  EXAMINATION: CT OF THE HEAD WITHOUT CONTRAST  7/23/2021 18:11 TECHNIQUE: CT of the head was performed without the administration of intravenous contrast. Dose modulation, iterative reconstruction, and/or weight based adjustment of the mA/kV was utilized to reduce the radiation dose to as low as reasonably achievable. COMPARISON: None available to me at the time of this interpretation. HISTORY: ORDERING SYSTEM PROVIDED HISTORY: slurred speech for 2 weeks TECHNOLOGIST PROVIDED HISTORY: Reason for exam:->slurred speech for 2 weeks Has a \"code stroke\" or \"stroke alert\" been called? ->No Decision Support Exception - unselect if not a suspected or confirmed emergency medical condition->Emergency Medical Condition (MA) What reading provider will be dictating this exam?->CRC FINDINGS: This exam is slightly limited by difficulties with patient positioning. Given this: BRAIN/VENTRICLES: This exam is grossly negative for acute intracranial hemorrhage, other intra-/extra-axial fluid collection, or mass effect/midline shift. Mild to moderate atherosclerotic calcification is scattered about the cavernous/supraclinoid segments of both internal carotid arteries, slightly worse on the left. There is mild mixed confluent and scattered leukoaraiosis, compatible with sequela of chronic microvascular ischemic disease. There is age-appropriate, generalized parenchymal volume loss. ORBITS: Both ocular lenses have been previously surgically replaced.  Subcentimeter senile limbic calcifications are associated with the medial rectus muscle insertions upon both globes. SINUSES/MASTOIDS: Trace to minimal scattered paranasal sinus mucosal thickening is most notable within the ethmoid sinus. Minimal intermediate-density fluid is scattered about the left mastoid air cell complex near its tip. The right mastoid air cell complex is moderately/severely hypoplastic. Moderate probable cerumen partially occludes the left external auditory canal, also minimally present on the right side. MUSCULOSKELETAL: There is background diffuse osteopenia. At least mild upper cervical degenerative disease is only incidentally demonstrated. .     1. Slightly limited exam, grossly negative for acute intracranial process, within the limits of this non-contrast CT exam. 2.  Sequela of atherosclerotic arterial and chronic microvascular ischemic disease, as described. 3.  Age-appropriate, generalized parenchymal volume loss. 4.  Paranasal sinus, left mastoid air cell complex, and bilateral external auditory canal disease, as described. The right mastoid air cell complex is moderately/severely hypoplastic. 5.  Chronic osseous findings, as described . CT CHEST WO CONTRAST    Result Date: 7/23/2021  EXAMINATION: CT OF THE CHEST WITHOUT CONTRAST 7/23/2021 5:58 pm TECHNIQUE: CT of the chest was performed without the administration of intravenous contrast. Multiplanar reformatted images are provided for review. Dose modulation, iterative reconstruction, and/or weight based adjustment of the mA/kV was utilized to reduce the radiation dose to as low as reasonably achievable. COMPARISON: None. HISTORY: ORDERING SYSTEM PROVIDED HISTORY: CHF vs PNA vs effusion vs all of above TECHNOLOGIST PROVIDED HISTORY: Reason for exam:->CHF vs PNA vs effusion vs all of above What reading provider will be dictating this exam?->CRC FINDINGS: Mediastinum: There is calcified plaque in the thoracic aorta. There are coronary artery calcifications.   There is no abnormal mediastinal or hilar mass seen. Lungs/pleura: There are moderate pleural effusions, right greater than left. Left pleural effusion is partially loculated laterally and in the upper major fissure. There is right lower lobe volume loss and consolidation, probably atelectasis related to the pleural effusion. Superimposed infiltrate not excluded. There is diffuse mild interstitial thickening suggestive of mild interstitial edema. Upper Abdomen: The patient is status post cholecystectomy. No acute abnormality seen in visualized upper abdomen. Soft Tissues/Bones: No acute abnormality     1. Moderate pleural effusions, right larger than left. Left pleural effusion is partially loculated. 2. Right lower lobe consolidation and volume loss, likely atelectasis although superimposed pneumonia/infiltrate not excluded. 3. Mild interstitial thickening probably represents mild interstitial edema. XR CHEST PORTABLE    Result Date: 7/23/2021  EXAMINATION: ONE XRAY VIEW OF THE CHEST 7/23/2021 12:34 pm COMPARISON: None. HISTORY: ORDERING SYSTEM PROVIDED HISTORY: shortness of breath wheezing TECHNOLOGIST PROVIDED HISTORY: Reason for exam:->shortness of breath wheezing What reading provider will be dictating this exam?->CRC FINDINGS: Bibasilar opacities silhouette right and left heart border and the hemidiaphragms. Interstitial prominence in perihilar locations. No pneumothorax. Opacities in perihilar locations and lung bases related to bilateral pneumonia or atelectasis and likely bilateral pleural effusions. US RETROPERITONEAL COMPLETE    Result Date: 7/24/2021  EXAMINATION: RETROPERITONEAL ULTRASOUND OF THE KIDNEYS AND URINARY BLADDER 7/24/2021 COMPARISON: None HISTORY: ORDERING SYSTEM PROVIDED HISTORY: ANGELA TECHNOLOGIST PROVIDED HISTORY: Reason for exam:->ANGELA What reading provider will be dictating this exam?->CRC FINDINGS: Kidneys:  The right kidney measures 9.6 cm in length and the left kidney measures 9.2 cm in length. Corticomedullary differentiation and cortical thickness is decreased bilaterally. No definite renal mass, renal cysts, nor intrarenal calcifications. No hydronephrosis. Bladder: The bladder was not distended for this exam and could not be evaluated. Bilateral renal cortical thinning which suggest changes related to chronic underlying medical renal disease. No hydronephrosis. The bladder was not distended for this exam and could not be evaluated. Discharge Exam:    HEENT: NCAT,  PERRLA, No JVD  Heart:  RRR, no murmurs, gallops, or rubs. Lungs:  CTA bilaterally, no wheeze, rales or rhonchi  Abd: bowel sounds present, nontender, nondistended, no masses  Extrem:  No clubbing, cyanosis, or edema    Disposition: [facility]     Patient Condition at Discharge: Stable    Patient Instructions:      Medication List      START taking these medications    furosemide 20 MG tablet  Commonly known as: LASIX  Take 1 tablet by mouth 2 times daily        CHANGE how you take these medications    Levemir FlexTouch 100 UNIT/ML injection pen  Generic drug: insulin detemir  What changed: Another medication with the same name was removed. Continue taking this medication, and follow the directions you see here.         CONTINUE taking these medications    acetaminophen 325 MG tablet  Commonly known as: TYLENOL     ALPRAZolam 0.25 MG tablet  Commonly known as: XANAX     amLODIPine 5 MG tablet  Commonly known as: NORVASC     ascorbic acid 500 MG tablet  Commonly known as: VITAMIN C     aspirin 81 MG chewable tablet     atorvastatin 20 MG tablet  Commonly known as: LIPITOR     bisacodyl 10 MG suppository  Commonly known as: DULCOLAX     Byetta 5 MCG Pen 5 MCG/0.02ML injection  Generic drug: exenatide     citalopram 20 MG tablet  Commonly known as: CELEXA     doxycycline hyclate 100 MG capsule  Commonly known as: VIBRAMYCIN     ferrous sulfate 325 (65 Fe) MG tablet  Commonly known as: IRON 325     fleet 7-19 GM/118ML glucagon 1 MG injection     * insulin lispro 100 UNIT/ML injection vial  Commonly known as: HUMALOG     * insulin lispro 100 UNIT/ML injection vial  Commonly known as: HUMALOG     ipratropium-albuterol 0.5-2.5 (3) MG/3ML Soln nebulizer solution  Commonly known as: DUONEB     levETIRAcetam 250 MG tablet  Commonly known as: KEPPRA     linagliptin 5 MG tablet  Commonly known as: TRADJENTA     magnesium hydroxide 400 MG/5ML suspension  Commonly known as: MILK OF MAGNESIA     metoprolol tartrate 50 MG tablet  Commonly known as: LOPRESSOR     mirtazapine 15 MG tablet  Commonly known as: REMERON     Nuedexta 20-10 MG Caps per capsule  Generic drug: dextromethorphan-quiNIDine     senna 8.6 MG tablet  Commonly known as: SENOKOT     therapeutic multivitamin-minerals tablet     vitamin D 11914 UNIT Caps  Commonly known as: CHOLECALCIFEROL         * This list has 2 medication(s) that are the same as other medications prescribed for you. Read the directions carefully, and ask your doctor or other care provider to review them with you. STOP taking these medications    bumetanide 0.5 MG tablet  Commonly known as: BUMEX     metOLazone 2.5 MG tablet  Commonly known as: ZAROXOLYN           Where to Get Your Medications      These medications were sent to [facility] [address], [address] [address]    Phone: [phone]   · furosemide 20 MG tablet       Activity: activity as tolerated  Diet: regular diet    Pt has been advised to: Follow-up with Hina Lawrence DO in 1 week.   Follow-up with consultants as recommended by them    Note that over 30 minutes was spent in preparing discharge papers, discussing discharge with patient, medication review, etc.    Signed:  Fiordaliza Bennett MD  8/4/2021  4:15 PM

## 2021-08-04 NOTE — PLAN OF CARE
Problem: Confusion - Acute:  Goal: Absence of continued neurological deterioration signs and symptoms  Description: Absence of continued neurological deterioration signs and symptoms  Outcome: Met This Shift     Problem: Injury - Risk of, Physical Injury:  Goal: Absence of physical injury  Description: Absence of physical injury  Outcome: Met This Shift     Problem: Mood - Altered:  Goal: Mood stable  Description: Mood stable  Outcome: Met This Shift     Problem: Falls - Risk of:  Goal: Absence of physical injury  Description: Absence of physical injury  Outcome: Met This Shift

## 2021-08-04 NOTE — PROGRESS NOTES
Dr Aria Ortega called regarding pt being discharged by all consults and can return to her facility today @ 4:30pm    Electronically signed by Yvon Cardoza RN on 8/4/2021 at 2:51 PM

## 2021-08-04 NOTE — PROGRESS NOTES
Comprehensive Nutrition Assessment    Type and Reason for Visit:  Reassess    Nutrition Recommendations/Plan: Recommend to continue Boost Pudding supplement BID and Magic Cup supplement daily to help meet increased nutritional needs and d/t decreased po intake of meals. Nutrition Assessment:  Patients po intake has been sporadic, averaging ~50% of meals served ; pt at nutritional risk d/t increased needs from wound healing ; adm w/ pleural effusion ; s/p thoracentesis on 7/26 (850ml removed) ; ANGELA on CKD ; hx of DM and CHF ; noted dysphagia and dementia ; on modified diet ; will continue ONS    Malnutrition Assessment:  Malnutrition Status: At risk for malnutrition (Comment)    Context:  Acute Illness     Findings of the 6 clinical characteristics of malnutrition:  Energy Intake:  1 - 75% or less of estimated energy requirements for 7 or more days  Weight Loss:  Unable to assess (d/t lack of weight history and fluid shifts ; hx of CHF)     Body Fat Loss:  Unable to assess     Muscle Mass Loss:  Unable to assess    Fluid Accumulation:  No significant fluid accumulation     Strength:  Not Performed    Estimated Daily Nutrient Needs:  Energy (kcal):  0358-6590 (REE 1117 x 1.2 SF); Weight Used for Energy Requirements:  Current     Protein (g):  70-85 (1.3-1.5g/kg IBW); Weight Used for Protein Requirements:  Ideal        Fluid (ml/day):  2939-9533; Method Used for Fluid Requirements:  1 ml/kcal      Nutrition Related Findings:  -I&Os (-11.9 L), 1+ edema, A&O x 1, active BS, rounded abd, redness to coccyx, dysphagia      Wounds:  Multiple, Open Wounds, Surgical Incision (wounds x 4 ; Incision x 1)       Current Nutrition Therapies:    ADULT DIET; Dysphagia - Pureed; 4 carb choices (60 gm/meal); Low Sodium (2 gm); Moderately Thick (Honey)  Adult Oral Nutrition Supplement;  Fortified Pudding Oral Supplement  Adult Oral Nutrition Supplement; Frozen Oral Supplement    Anthropometric Measures:  · Height: 5' 4\" (162.6 cm)  · Current Body Weight: 161 lb (73 kg) (8/3, bedscale)   · Admission Body Weight: 176 lb (79.8 kg) (actual 7/24)    · Usual Body Weight:  (UTO UBW 2/2 poor EMR wt hx w/ no actual wt's pta)     · Ideal Body Weight: 120 lbs; % Ideal Body Weight 134.2 %   · BMI: 27.6  · BMI Categories: Overweight (BMI 25.0-29. 9)       Nutrition Diagnosis:   · Inadequate oral intake related to cognitive or neurological impairment (hx of dementia) as evidenced by poor intake prior to admission, intake 26-50%, intake 51-75%      Nutrition Interventions:   Food and/or Nutrient Delivery:  Continue Current Diet, Continue Oral Nutrition Supplement  Nutrition Education/Counseling:  Education not indicated   Coordination of Nutrition Care:  Continue to monitor while inpatient, Speech Therapy, Swallow Evaluation    Goals:  Pt will consume 50-75% of meals served       Nutrition Monitoring and Evaluation:   Behavioral-Environmental Outcomes:  Beliefs and Attitutes   Food/Nutrient Intake Outcomes:  Food and Nutrient Intake, Supplement Intake  Physical Signs/Symptoms Outcomes:  Biochemical Data, Chewing or Swallowing, GI Status, Fluid Status or Edema, Hemodynamic Status, Meal Time Behavior, Nutrition Focused Physical Findings, Weight, Skin     Discharge Planning:     Too soon to determine     Electronically signed by Sofia Matta RD, LD on 8/4/21 at 10:27 AM EDT    Contact: [phone]

## 2021-08-04 NOTE — PROGRESS NOTES
Attempted to call n-n numerous times and when sent to the correct wing an older lady picked up the phone twice and hung up

## 2021-08-04 NOTE — PROGRESS NOTES
Physical Therapy  Treatment Note    Name: Daniella Andrews  : 1926  MRN: [id number]      Date of Service: 2021    Evaluating PT:  Garrett Pagan, PT, DPT [id number]    Room #:  6514/3227-Z  Diagnosis:  Acute congestive heart failure, unspecified heart failure type ([facility] [address] [I50.9]  PMHx/PSHx:  Dementia, HTN, Alzheimer's  Procedure/Surgery:  n/a  Precautions:  Falls, TSM, TAPS, Bed alarm  Equipment Needs:  TBD by facility    SUBJECTIVE:    Pt unreliable historian. Per chart, pt from SNF. Unclear PLOF, pt states she ambs with ww. OBJECTIVE:   Initial Evaluation  Date: 21 Treatment  Date: 2021 Short Term/ Long Term   Goals   AM-PAC 6 Clicks 47/ 36/52    Was pt agreeable to Eval/treatment? yes yes    Does pt have pain? Reports pain in LLE, does not quantify No c/o pain    Bed Mobility  Rolling: modA  Supine to sit: modA  Sit to supine: modA  Scooting: modA Rolling: Sharron  Supine to sit: Sharron  Sit to supine: NT  Scooting: Sharron Rolling: sup  Supine to sit: sup  Sit to supine: sup  Scooting: sup   Transfers Sit to stand: modA  Stand to sit: modA  Stand pivot: NT Sit<>stand: Sharron from EOB, modA form chair with no arm rests  Stand pivot: Sharron front [employer] Sit to stand: sup  Stand to sit: sup  Stand pivot: sup   Ambulation    NT 20' x2, FWW, Sharron + assist to manage equipment 25', LRAD, sup   Stair negotiation: ascended and descended NT  n/a   ROM BUE:  Refer to OT note  BLE:  WFL     Strength BUE:  Refer to OT note  BLE:  Grossly 4/5  5/5   Balance Sitting EOB:  SBA-Sharron  Dynamic Standing:  Min-modA  Sitting EOB:  sup  Dynamic Standing:  sup     Pt is A & O x 1  Sensation:  Pt denies numbness and tingling to extremities  Edema:  unremarkable    Vitals: spo2 >90% throughout activity on 4 L o2 via NC      Patient education  Pt educated on role of PT, tx, balance, gait, current status and POC, d/c planning.      Patient response to education:   Pt verbalized understanding Pt demonstrated skill Pt requires further education in this area   yes partial All areas     ASSESSMENT:    Comments:  Pt supine with IV infusing, navarro intact, o2 via NC, NAD> RN clearance prior to session. Pt reuqiring Sharron to complete tx to EOB, able to sit at EOB at SBA level. Pt amb using FWW to doorway, sitting in chair for seated rest. Pt requiring Sharron to stand from EOB, mod A to stand from chair with no arm rests. Pt able to amb short distances at [facility], requires assist with equipment, but distance limited by fatigue. Pt required verbal cueing to sequence turn to approach chair prior to sitting, pt attempting to sit unsafely before reaching chair, also requiring cues to reach for arm rests prior to sitting. Will continue to follow POC as listed. Treatment:  Patient practiced and was instructed in the following treatment:     Bed mobility training - pt given verbal and tactile cues to facilitate proper sequencing and safety during rolling and supine>sit as well as provided with physical assistance to complete task    Sitting EOB for 5  minutes for upright tolerance, postural awareness   STS- verbal cues for foot and hand placement, increased physical lift assist from chair with no arm rests due to weakness   Gait- steadying assist, increased time and effort required, verbal cues to sequence turn and approach to chair    PLAN:    Patient is making good progress towards established goals. Will continue with current POC.       Time in  0830  Time out  0855    Total Treatment Time  25 minutes     CPT codes:  [] Gait training 20539 0 minutes  [] Manual therapy 40134 0 minutes  [x] Therapeutic activities 95626 25 minutes  [] Therapeutic exercises 83786 0 minutes  [] Neuromuscular reeducation 70636 0 minutes    Bernadette Engel., PT, DPT  [id number]

## 2021-08-04 NOTE — PROGRESS NOTES
OCCUPATIONAL THERAPY TREATMENT NOTE    [address]  [address] [employer], [place]       Date:2021  Patient Name: Venkat Harding  MRN: [id number]  : 1926  Room: [address] [place] [address]          Evaluating OT: Bin Ash OTR/L   [id number]       Referring Erik Meneses MD    Specific Provider Orders/Date:OT eval and treat 2021       Diagnosis: CHF      Pertinent Medical History: Alzheimer's dementia, HTN, A fib,     Precautions:  Fall Risk, alarm, TSM, O2, [place]      Assessment of current deficits    [x] Functional mobility            [x]ADLs           [x] Strength                  [x]Cognition    [x] Functional transfers          [x] IADLs         [x] Safety Awareness   [x]Endurance    [] Fine Coordination                         [x] Balance      [] Vision/perception   []Sensation      []Gross Motor Coordination             [] ROM           [] Delirium                   [] Motor Control      OT PLAN OF CARE   OT POC based on physician orders, patient diagnosis and results of clinical assessment     Frequency/Duration  1-3 days/wk for 2 weeks PRN   Specific OT Treatment Interventions to include:   ADL retraining/adapted techniques and AE recommendations to increase functional independence within precautions                    Energy conservation techniques to improve tolerance for selfcare routine   Functional transfer/mobility training/DME recommendations for increased independence, safety and fall prevention         Patient/family education to increase safety and functional independence             Environmental modifications for safe mobility and completion of ADLs                             Therapeutic activity to improve functional performance during ADLs.                                          Therapeutic exercise to improve tolerance and functional strength for ADLs   Balance retraining/tolerance tasks for facilitation of postural control with dynamic challenges during ADLs . Positioning to improve functional independence     Recommended Adaptive Equipment: TBD      SOCIAL: per daughter - patient has been at [facility] for past 3 years (ambulating with with w/walker - for  past 3 months has been in [facility]. Using primarily w/c for mobility (patient does try to propel with her arms and legs) assist with ADLS. Pain Level: Pt did not complain of pain this session  Cognition: A&O: pleasant, following commands               Memory:  Forgetful               Sequencing:  Fair               Problem solving:  Fair               Judgement/safety:  Fair                 Functional Assessment:  AM-PAC Daily Activity Raw Score: 14/24    Initial Eval Status  Date: 7/27/21 Treatment Status  Date: 8/3/21 STGs = LTGs  Time frame: 10-14 days   Feeding SBA/Set-up  Supervision  Pt able to pour water into glass from pitcher, hold cup, and bring to mouth, drinking from straw Supervision    Grooming Mod A ,seated   Decrease standing balance/tolerance SBA  Pt washed face, combed hair, applied deodorant seated upright in chair  SBA   UB Dressing Mod A  modA  Octav[facility] gown with assistance to manage of lines/tubes SBA    LB Dressing Max A  Sharron  Pt donned/doffed hospital socks using cross over technique seated upright in chair    DNT pants this date Mod A    Bathing Max A  modA  Simulated Task    Pt able to wash of UB, and thighs, with assistance to wash of LE's and stand to wash of buttocks Mod A    Toileting Assist with hygiene  Dep/Max A  Pt incontinent of bowel during session, assistance with hygeine.  Pt having of navarro catheter Mod A    Bed Mobility  Mod A  Supine <> sit  DNT  Sharron per prevous level    Pt seated upright in chair upon arrival and at end of session    Min A   Functional Transfers Mod A  Sit-stand from bed  modA  Sit to Stand  Stand to Sit  x3 stands    Cueing for hand placement, slight LOB x1 once standing with w.w. Min/CGA    Functional Mobility Mod A,w/walker,O2  Side steps next to bed only   Decrease balance/endurance   No complaints of SOB  Sharron  Pt ambulated short forward/backward steps with w.w Min A  with good tolerance    Balance Sitting:     Static:  SBA- EOB     Dynamic:Mod A   Standing: Mod A  Sitting Supported:  SBA    Standing:  min/modA Min A standing   Supervision - sitting    Activity Tolerance Fair - with light activity  Fair-    Good  with ADL activity    Visual/  Perceptual Glasses: none by bedside                       Treatment/Education/Comments: Upon arrival pt seated upright in chair, agreeable to therapy, finishing of PT session upon arrival, nursing present okaying pt to be seen this session. Pt completed of transfers, functional mobility of short steps, ADL tasks and UB exercises throughout session. Pt educated on importance of therapy, goals to be reached, safety and hand placement with transfers, safety and walker management with mobility, techniques to assist and techniques to assist with LB dressing tasks. Pt also educated and completed of BUE exercises for 5reps x5 planes, with rest breaks, focusing on increasing of ROM of BUE's, activity tolerance, encouraging pt to complete daily. At end of session, pt seated upright in chair, all tubes and lines intact, call light within reach, telesitter in room. · Pt has made fair progress towards set goals.    · Continue with current plan of care focusing on increasing of independency with transfers and ADL tasks  ·   Treatment Time In: 8:56am          Treatment Time Out: 9:20am            Treatment Charges: Mins Units   Ther Ex  [id number]     Manual Therapy [id number]     Thera Activities [id number] 16 1   ADL/Home Mgt [id number] 8 1   Neuro Re-ed [id number]     Group Therapy      Orthotic manage/training  [id number]     Non-Billable Time     Total Timed Treatment 24 2       Muriel Miller ECHEVERRIA/L [id number]

## 2021-08-04 NOTE — CARE COORDINATION
Pt was denied by her insurance yesterday afternoon to return to [facility] at the [facility] for skilled stay (therapies) and will return there as a long term care patient. Await nephrology input regarding discharge today. Met with pt in room. Pt notified she is potential discharge back to [facility] today. Ambulette form with pt's soft chart. Covid test was done on 07/30. Sw/cm will follow     [address] to dc per renal's note. Pt is setup for 4pm ambulette pickup per Blanca Grace with [employer]. Blanca Grace notified pt is on o2 at 4l/nc. Lilli at [facility] at the [facility] notified of  time and no repeat covid is needed. Pt and pt's nurse notified. I called pt's daughter, ANDREW Rondon#[phone] left voicemail to notify her of  time.      [phone]  Spoke with pt's daughter, BRIAN Rondon#[phone] and she is aware her mother is being picked up at 4pm today to go to [facility] at the [facility]

## 2021-08-06 ENCOUNTER — TELEPHONE (OUTPATIENT)
Dept: CARDIOLOGY CLINIC | Age: 86
End: 2021-08-06

## 2021-08-13 ENCOUNTER — VIRTUAL VISIT (OUTPATIENT)
Dept: CARDIOLOGY CLINIC | Age: 86
End: 2021-08-13
Payer: COMMERCIAL

## 2021-08-13 DIAGNOSIS — I50.32 CHRONIC HEART FAILURE WITH PRESERVED EJECTION FRACTION (HCC): ICD-10-CM

## 2021-08-13 PROCEDURE — 99442 PR PHYS/QHP TELEPHONE EVALUATION 11-20 MIN: CPT | Performed by: NURSE PRACTITIONER

## 2021-08-13 NOTE — PATIENT INSTRUCTIONS
1. Stop metoprolol - this was stopped during hospitalization due to tachy-zeeshan syndrome. She was evaluated per EP who recommended to avoid any AV jamal agents. Given co-morbidities decision was made to defer any invasive procedures and just treat medically. 2. Continue rest of current cardiac medications    3. Follow up with cardiology as needed    4. Weigh yourself daily    -Stay Hydrated    -Diet should sodium restricted to 2 grams    -Again watch your daily weight trends and if you gain water weight please follow below instructions.    -If you gain 3-5 pounds in 2-3 days OR notice that you are retaining fluid in anyway just like you did before then take an extra dose of your water pill (furosemide/Lasix) every day until you lose the weight or feel better.     -If you notice that you have taken more than 2 extra doses in 1 week then please call and let us know. -If at any time you feel that you are retaining fluid, your medications are not working, or you feel ill in anyway, then please call us for either same day appointment or the next day, and for instructions. Our goal is to keep you out of the emergency room and the hospital and we have ways to do it. You just need to call us in a timely manner.     -If you become sick for other reasons, and notice that you are not urinating as much, the urine is very dark, you have significant diarrhea or vomiting, then please DO NOT take your water pill and CALL US immediately.

## 2021-08-13 NOTE — PROGRESS NOTES
[facility] Cardiology  TELEHEALTH EVALUATION -- Audio/Visual (During TOJNN-67 public health emergency)        Reason for Visit: Heart Failure    Primary Cardiologist: Dr. Abundio Sams (through inpatient consult)        History of Present Illness:     Ms. Maximino Taylor is a 80year old female with a PMHx of chronic HFpEF, HTN, PAF, bradycardia, cLBBB, T2DM, CKD, dementia, depression    She was recently admitted to the hospital for increased shortness of breath, acute heart failure with hypoxia, hyperkalemia and ANGELA. During hospitalization she was admitted with new onset atrial fibrillation with tachybradycardia syndrome, she was evaluated per electrophysiology and given age and comorbidities invasive procedure with PPM was deferred. Beta-blockers were held during hospitalization however appears to have been resumed at discharge. We are conducting post acute heart failure hospitalization follow up via telehone given COVID-19 pandemic. Since discharge from the hospital per RN and Kimberlee [place] has been doing well. She denies any worsening shortness of breath, swelling, orthopnea, PND, chest pain, pressure, heaviness, palpitations, dizziness, lightheadedness or near syncope.       Patient Active Problem List    Diagnosis Date Noted    Paroxysmal atrial fibrillation ([address] 07/25/2021    Prolonged Q-T interval on ECG 07/23/2021    Acute congestive heart failure ([address] 07/23/2021    Acute respiratory failure with hypoxia ([address] 07/23/2021    Acute kidney injury superimposed on CKD ([address] 07/23/2021    Stage 4 chronic kidney disease ([address] 04/30/2021    Anemia of chronic disease 04/30/2021    Type 2 diabetes mellitus without complication, with long-term current use of insulin ([address] 04/30/2021    Hypertension 04/30/2021    Hyperlipidemia 04/30/2021    Vascular dementia without behavioral disturbance ([address] 04/30/2021    Situational depression 04/30/2021    Vitamin D deficiency 04/30/2021    Osteoarthritis 04/30/2021    Overactive bladder 04/30/2021     Past medical history  1. Former smoker  2. Chronic kidney disease  3. Noncompliance  4. Hyperlipidemia  5. Hypertension  6. Diabetes which is  which is insulin requiring  7. Dementia  8. Dysphagia  9. 2D echocardiogram 9/11/2017, Dr. Francisco Medina, indication presyncope and EF 70%  Technically difficult and limited examination.        The left ventricle is normal size.        The left ventricular systolic function is normal.        Grade I - abnormal relaxation pattern.        Left atrium is not well visualized but appears to be enlarged.        The Aortic valve is sclerotic.        Stenosis does not appear to be present but cannot be ruled out.            10. Left bundle branch block in 2017  11. Major depressive disorder  12. Nonthrombocytopenic purpura  13.  Pseudobulbar affect      Past Medical History:   Diagnosis Date    Acute kidney failure (HCC)     [facility] disease [facility]     Anxiety     Dementia with behavioral disturbance ([facility]     Diabetes mellitus (HCC)     type 2    Dysphagia     Falls     Fluid overload     Heart failure (HCC)     Hyperkalemia     Hyperlipidemia     Hypertension     Major depressive disorder     Muscle wasting and atrophy, NEC, left ankle and foot     Muscle weakness     Nonthrombocytopenic purpura (HCC)     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     Pleural effusion     Pseudobulbar affect            Past Surgical History:   Procedure Laterality Date    THORACENTESIS Right 07/26/2021    removed 850ml hazy edilberto           Allergies   Allergen Reactions    Penicillins          Outpatient Medications Marked as Taking for the 8/13/21 encounter (Virtual Visit) with CRAIG Oleary CNP   Medication Sig Dispense Refill    furosemide (LASIX) 20 MG tablet Take 1 tablet by mouth 2 times daily 60 tablet 3    acetaminophen (TYLENOL) 325 MG tablet Take 650 mg by mouth every 6 hours as needed for Pain      aspirin 81 MG chewable tablet Take 81 mg by mouth daily      exenatide (BYETTA 5 MCG PEN) 5 MCG/0.02ML injection Inject 5 mcg into the skin every morning (before breakfast)      citalopram (CELEXA) 20 MG tablet Take 20 mg by mouth daily      bisacodyl (DULCOLAX) 10 MG suppository Place 10 mg rectally daily as needed for Constipation      ipratropium-albuterol (DUONEB) 0.5-2.5 (3) MG/3ML SOLN nebulizer solution Inhale 1 vial into the lungs every 2 hours as needed for Shortness of Breath      ferrous sulfate (IRON 325) 325 (65 Fe) MG tablet Take 325 mg by mouth daily (with breakfast)      glucagon 1 MG injection Inject 1 kit into the muscle as needed (hypoglycemia)      insulin lispro (HUMALOG) 100 UNIT/ML injection vial Inject 5 Units into the skin 3 times daily (before meals)      insulin lispro (HUMALOG) 100 UNIT/ML injection vial Inject 0-9 Units into the skin 2 times daily (before meals) Per sliding scale   0-200 = 0 units  201-250 = 3 units  251-300 = 4 units  301-350 = 6 units  351-400 = 9 units      levETIRAcetam (KEPPRA) 250 MG tablet Take 250 mg by mouth every morning (before breakfast)      insulin detemir (LEVEMIR FLEXTOUCH) 100 UNIT/ML injection pen Inject 10 Units into the skin nightly      linagliptin (TRADJENTA) 5 MG tablet Take 5 mg by mouth daily      atorvastatin (LIPITOR) 20 MG tablet Take 20 mg by mouth daily      magnesium hydroxide (MILK OF MAGNESIA) 400 MG/5ML suspension Take 30 mLs by mouth daily as needed for Constipation      mirtazapine (REMERON) 15 MG tablet Take 15 mg by mouth nightly      Multiple Vitamins-Minerals (THERAPEUTIC MULTIVITAMIN-MINERALS) tablet Take 1 tablet by mouth daily      amLODIPine (NORVASC) 5 MG tablet Take 5 mg by mouth daily      dextromethorphan-quiNIDine (NUEDEXTA) 20-10 MG CAPS per capsule Take 1 capsule by mouth 2 times daily      senna (SENOKOT) 8.6 MG tablet Take 1 tablet by mouth daily as needed for Constipation      ascorbic acid (VITAMIN C) 500 MG tablet Take 500 mg by mouth 2 times daily      ALPRAZolam (XANAX) 0.25 MG tablet Take 0.25 mg by mouth 2 times daily as needed for Anxiety.  Sodium Phosphates (FLEET) 7-19 GM/118ML Place 1 enema rectally daily as needed      vitamin D (CHOLECALCIFEROL) 20876 UNIT CAPS Take 50,000 Units by mouth once a week Given Fridays           Review of Systems:   Cardiac: As per HPI  General: No fever, chills, rigors  Pulmonary: As per HPI  HEENT: No visual disturbances, difficult swallowing  GI: No nausea, vomiting, abdominal pain  : No dysuria or hematuria  Endocrine: No thyroid disease or diabetes  Musculoskeletal: ROBB x 4, no focal motor deficits  Skin: Intact, no rashes  Neuro/Psych: No headache or seizures        Weights: Wt Readings from Last 3 Encounters:   08/03/21 161 lb 1.6 oz (73.1 kg)         Physical Examination:     Patient-Reported Vitals 8/13/2021   Patient-Reported Weight 154.6   Patient-Reported Height 5 4   Patient-Reported Systolic 410   Patient-Reported Diastolic 72   Patient-Reported Pulse 65   Patient-Reported Temperature 97.6   Patient-Reported SpO2 94      Unable to complete PE given telephone encounter       All the following diagnostics were personally reviewed and interpreted by me. LAB DATA:       8/4/2021 08:35   Sodium 142   Potassium 4.4   Chloride 101   CO2 30 (H)   BUN 46 (H)   Creatinine 1.9 (H)   Anion Gap 11   GFR Non- 25   GFR African American 30   Glucose 167 (H)   Calcium 8.9       IMAGING:    CXR (7/30/2021)  FINDINGS:  Left lower lung effusion is improved.  Interval development of small right-sided pleural effusion.  Opacities identified in the bilateral mid to lower lungs.  No pneumothorax. The cardiomediastinal silhouette is without acute process. The osseous structures are without acute process. Impression:  Interval development of small right-sided pleural effusion. Improvement of small left-sided pleural effusion.   Bilateral opacities may represent pneumonia in the proper clinical setting      CARDIAC TESTING:    TTE (7/29/2021, Dr. Cesar Perrin)   Summary:   Normal left ventricle size and systolic function. Ejection fraction is visually estimated at 55-60%. No regional wall motion abnormalities seen. Severe concentric left ventricular hypertrophy. Stage II diastolic dysfunction. Normal right ventricular size and function. TAPSE 23 mm. There is mild-to-moderate aortic stenosis with valve area of 1.5 sq cm. Aortic valve peak velocity 2.3 m/s, mean gradient 11 mmHg, DVI 0.43. Physiologic and/or trace aortic regurgitation is noted. Mild tricuspid regurgitation. RVSP is 52 mmHg. Pulmonary hypertension is mild . No evidence for hemodynamically significant pericardial effusion. ASSESSMENT:  1. Chronic HFpEF  2. ACC stage C / NYHA class   3. Near euvolemic per RN  4. Chronic atrial fibrillation with hx of pauses/SSS  5. cLBBB  6. CKD  7. Pleural effusion s/p recent thoracentesis   8. T2DM  9. Dementia   10. DNR-CC         PLAN:  1. Stop metoprolol - this was stopped during hospitalization due to tachy-zeeshan syndrome. She was evaluated per EP who recommended to avoid any AV jamal agents. Given co-morbidities decision was made to defer any invasive procedures and just treat medically. 2. Continue rest of current cardiac medications    3. Follow up with cardiology as needed    4. Weigh yourself daily    -Stay Hydrated    -Diet should sodium restricted to 2 grams    -Again watch your daily weight trends and if you gain water weight please follow below instructions.    -If you gain 3-5 pounds in 2-3 days OR notice that you are retaining fluid in anyway just like you did before then take an extra dose of your water pill (furosemide/Lasix) every day until you lose the weight or feel better.     -If you notice that you have taken more than 2 extra doses in 1 week then please call and let us know.     -If at any time you feel that you are retaining fluid, your patient into an appointment for the relevant concern          Linette Mukherjee, CRAIG - CNP